# Patient Record
Sex: MALE | Race: WHITE | NOT HISPANIC OR LATINO | Employment: OTHER | URBAN - METROPOLITAN AREA
[De-identification: names, ages, dates, MRNs, and addresses within clinical notes are randomized per-mention and may not be internally consistent; named-entity substitution may affect disease eponyms.]

---

## 2017-01-02 ENCOUNTER — TELEPHONE (OUTPATIENT)
Dept: FAMILY MEDICINE | Facility: CLINIC | Age: 67
End: 2017-01-02

## 2017-01-02 ENCOUNTER — THERAPY VISIT (OUTPATIENT)
Dept: PHYSICAL THERAPY | Facility: CLINIC | Age: 67
End: 2017-01-02
Payer: COMMERCIAL

## 2017-01-02 DIAGNOSIS — G89.29 CHRONIC LEFT-SIDED LOW BACK PAIN WITHOUT SCIATICA: Primary | ICD-10-CM

## 2017-01-02 DIAGNOSIS — M54.50 CHRONIC LEFT-SIDED LOW BACK PAIN WITHOUT SCIATICA: Primary | ICD-10-CM

## 2017-01-02 DIAGNOSIS — M54.5 LOW BACK PAIN, UNSPECIFIED BACK PAIN LATERALITY, UNSPECIFIED CHRONICITY, WITH SCIATICA PRESENCE UNSPECIFIED: Primary | ICD-10-CM

## 2017-01-02 PROCEDURE — 97162 PT EVAL MOD COMPLEX 30 MIN: CPT | Mod: GP | Performed by: PHYSICAL THERAPIST

## 2017-01-02 PROCEDURE — 97112 NEUROMUSCULAR REEDUCATION: CPT | Mod: GP | Performed by: PHYSICAL THERAPIST

## 2017-01-02 PROCEDURE — 97110 THERAPEUTIC EXERCISES: CPT | Mod: GP | Performed by: PHYSICAL THERAPIST

## 2017-01-02 NOTE — TELEPHONE ENCOUNTER
Reason for Call:  Other     Detailed comments: Patient would like physical therapy orders for low back pain    Phone Number Patient can be reached at: Cell number on file:    Telephone Information:   Mobile [phone]       Best Time:     Can we leave a detailed message on this number? NO    Call taken on 1/2/2017 at 9:58 AM by Divya Hall

## 2017-01-02 NOTE — TELEPHONE ENCOUNTER
Please see message below.  Patient would PT orders for low back pain.  RN sees previous PT orders from 7/8/2015.    Last OV was 1/20/2016, does have upcoming OV 2/1/2017.      Routed to PCP to advise on PT orders.    Winter Pinto RN  [facility]

## 2017-01-02 NOTE — TELEPHONE ENCOUNTER
Called and spoke with patient, advised of message as below.  He will call [facility] to set up appt.    Winter Pinto RN  [facility]

## 2017-01-02 NOTE — PROGRESS NOTES
[facility] Initial Evaluation -- Lumbar    Date: January 2, 2017  Trent Estrada is a 66 year old male with a Lumbar condition.   Referral: GP  Work Mechanical Stresses:    Employment Status:  Prolonged sitting/computer work - normal hours and duties  Leisure Mechanical Stresses: core exer 3 x/wk and areobics and wts at club 3 x/week  Functional disability score (CANDIDO/STarT Back):  8  Visual Analog Score (VAS 0-10): 4/10, 2-6/10 range    HISTORY:    Present symptoms: Lt Groin and buttock, no leg pain  Pain quality (sharp/shooting/stabbing/aching/burning/cramping):  Constant aching groin, int aching buttock   Paresthesia (yes/no):  no    Present since: exacerbation 4-5 mos ago .   Symptoms (improving/unchanging/worsening):  unchanging.   Symptoms commenced as a result of: JAMES   Condition occurred in the following environment:   unknown     Symptoms at onset (back/thigh/leg): buttock and groin  Constant symptoms (back/thigh/leg): Lt groin   Intermittent symptoms (back/thigh/leg): Lt buttock    (With consideration for bending, sitting/rising, standing, walking, lying, am/as the day progresses/pm, when still/on the move)  Symptoms are made worse with the following: rising from sitting - 4/10, walking 3-4/10, prolonged stdg, occasional w/ changing position in bed, worse in AM, When still  Symptoms are made better with the following: on the move,     Disturbed sleep (yes/no):  occas pain w/ position changes, not waking d/t pain, Sleeping postures (prone/sup/side R/L): stomach    Previous episodes (0/1-5/6-10/11+): <5 years Year of first episode: 2014    Previous history: JAMES for onset,  Previous treatments: Previous PT - helped but pain returned      Specific Questions:  Cough/Sneeze/Strain (pos/neg): no  Bowel/Bladder (normal/abnormal): normal  Gait (normal/abnormal): normal  Medications (nil/NSAIDS/analg/steroids/anticoag/other):  none  Medical allergies:  none  General Health  (excellent/good/fair/poor):  good  Pertinent medical history:  none  Imaging (NA/Xray/MRI):  none  Recent or major surgery (yes/no):  none  Night pain (yes/no): no  Accidents (yes/no): no  Unexplained weight loss (yes/no): no  Barriers at home: none  Other red flags: none    EXAMINATION    Posture:   Sitting (good/fair/poor): fair  Standing (good/fair/poor):good  Lordosis (red/acc/normal): normal  Correction of posture (better/worse/no effect): better    Lateral Shift (right/left/nil): nil  Relevant (yes/no):  NA  Other Observations: NA    Neurological:    Motor deficit:  Pt can Heel/toe walk and single leg squat  Reflexes:  NA  Sensory deficit:  Normal Lt touch  Dural signs:  (-)    Movement Loss:   Armaan Mod Min Nil Pain   FlexionX    X Sl dev Lt, sl pain Lt SI   Extension  X   Central LB mild   Side Gliding R  X      Side Gliding L   X       Test Movements:   During: produces, abolishes, increases, decreases, no effect, centralizing, peripheralizing   After: better, worse, no better, no worse, no effect, centralized, peripheralized    Pre-test Symptoms Standing: slight Rt groin    Symptoms During Symptoms After ROM increased ROM decreased No Effect   FIS No Effect and         Rep FIS Produces slight in groin and   increases Incr SG Rt and Lt  No change pain   EIS        Rep EIS        Pre-test Symptoms Lying: none    Symptoms During Symptoms After ROM increased ROM decreased No Effect   KRISTIE        Rep KRISTIE        EIL No Effect and         Rep EIL No Effect (stretch LB) and   Better and   X         Static Tests:  Sitting slouched:  Pain Lt groin  Sitting erect:  decr pain  Standing slouched NA  Standing erect:  NA  Lying prone in extension:  NA Long sitting:  NA    Other Tests:     Provisional Classification:  Derangement - Asymmetrical, unilateral, symptoms above knee    Principle of Management:  Education:  Posture and need for High reps w/ exer Equipment provided:  Recommended L-roll  Mechanical therapy (Y/N):   yes   Extension principle:  Press Up and EIStdg  Lateral Principle:    Flexion principle:    Other:      ASSESSMENT/PLAN:    Patient is a 66 year old male with lumbar complaints.    Patient has the following significant findings with corresponding treatment plan.                Diagnosis 1:  LBP  Pain -  manual therapy, education, directional preference exercise and home program  Decreased ROM/flexibility - manual therapy, therapeutic exercise and home program  Decreased function - therapeutic activities and home program  Impaired posture - neuro re-education and home program    Therapy Evaluation Codes:   1) History comprised of:   Personal factors that impact the plan of care:      Profession and Time since onset of symptoms.    Comorbidity factors that impact the plan of care are:      None.     Medications impacting care: None.  2) Examination of Body Systems comprised of:   Body structures and functions that impact the plan of care:      Lumbar spine.   Activity limitations that impact the plan of care are:      Sitting, Walking and rising from sitting, bed mobility, AM.   Clinical presentation characteristics are:    Evolving/Changing.  3) Presentation comprised of:   Presentation scored as Moderate complexity with Evolving presentation with changing characteristics..  4) Decision-Making    Moderate complexity using standardized patient assessment instrument and/or measureable assessment of functional outcome.  Cumulative Therapy Evaluation is: Moderate complexity.    Previous and current functional limitations:  (See Goal Flow Sheet for this information)    Short term and Long term goals: (See Goal Flow Sheet for this information)     Communication ability:  Patient appears to be able to clearly communicate and understand verbal and written communication and follow directions correctly.  Treatment Explanation - The following has been discussed with the patient:   RX ordered/plan of care  Anticipated  outcomes  Possible risks and side effects  This patient would benefit from PT intervention to resume normal activities.   Rehab potential is good.    Frequency:  1 X week, once daily  Duration:  for 6-8 weeks  Discharge Plan:  Achieve all LTG.  Independent in home treatment program.  Reach maximal therapeutic benefit.    Please refer to the daily flowsheet for treatment today, total treatment time and time spent performing 1:1 timed codes.

## 2017-01-09 ENCOUNTER — THERAPY VISIT (OUTPATIENT)
Dept: PHYSICAL THERAPY | Facility: CLINIC | Age: 67
End: 2017-01-09
Payer: COMMERCIAL

## 2017-01-09 DIAGNOSIS — M54.50 CHRONIC LEFT-SIDED LOW BACK PAIN WITHOUT SCIATICA: Primary | ICD-10-CM

## 2017-01-09 DIAGNOSIS — G89.29 CHRONIC LEFT-SIDED LOW BACK PAIN WITHOUT SCIATICA: Primary | ICD-10-CM

## 2017-01-09 PROCEDURE — 97110 THERAPEUTIC EXERCISES: CPT | Mod: GP | Performed by: PHYSICAL THERAPIST

## 2017-01-09 PROCEDURE — 97530 THERAPEUTIC ACTIVITIES: CPT | Mod: GP | Performed by: PHYSICAL THERAPIST

## 2017-01-16 ENCOUNTER — THERAPY VISIT (OUTPATIENT)
Dept: PHYSICAL THERAPY | Facility: CLINIC | Age: 67
End: 2017-01-16
Payer: COMMERCIAL

## 2017-01-16 DIAGNOSIS — G89.29 CHRONIC LEFT-SIDED LOW BACK PAIN WITHOUT SCIATICA: Primary | ICD-10-CM

## 2017-01-16 DIAGNOSIS — M54.50 CHRONIC LEFT-SIDED LOW BACK PAIN WITHOUT SCIATICA: Primary | ICD-10-CM

## 2017-01-16 PROCEDURE — 97530 THERAPEUTIC ACTIVITIES: CPT | Mod: GP | Performed by: PHYSICAL THERAPIST

## 2017-01-16 PROCEDURE — 97110 THERAPEUTIC EXERCISES: CPT | Mod: GP | Performed by: PHYSICAL THERAPIST

## 2017-01-16 NOTE — PROGRESS NOTES
SUBJECTIVE:                                                            Trent Estrada is a 66 year old male who presents for a Preventive Visit.  Are you in the first 12 months of your Medicare Part B coverage?  No    Healthy Habits:    Do you get at least three servings of calcium containing foods daily (dairy, green leafy vegetables, etc.)? 2 servings    Amount of exercise or daily activities, outside of work: 5 day(s) per week    Problems taking medications regularly not applicable    Medication side effects: No    Have you had an eye exam in the past two years? yes    Do you see a dentist twice per year? yes    Do you have sleep apnea, excessive snoring or daytime drowsiness?no    COGNITIVE SCREEN  1) Repeat 3 items (Banana, Sunrise, Chair)    2) Clock draw: NORMAL  3) 3 item recall: Recalls 3 objects  Results: 3 items recalled: COGNITIVE IMPAIRMENT LESS LIKELY    Mini-CogTM Copyright S Rehana. Licensed by the author for use in [facility]; reprinted with permission (soob@[facility]). All rights reserved.          Other concerns:  None    All Histories reviewed and updated in [facility] as appropriate.  Social History   Substance Use Topics     Smoking status: Never Smoker      Smokeless tobacco: Never Used     Alcohol Use: No       The patient does not drink >3 drinks per day nor >7 drinks per week.    Today's PHQ-2 Score:   PHQ-2 ( 1999 Pf[employer]) 2/1/2017 1/20/2016   Q1: Little interest or pleasure in doing things 0 0   Q2: Feeling down, depressed or hopeless 0 0   PHQ-2 Score 0 0       Do you feel safe in your environment - Yes    Do you have a Health Care Directive?: No: Advance care planning reviewed with patient; information given to patient to review.    Current providers sharing in care for this patient include:   Patient Care Team:  Yonis Martin MD as PCP - General      Hearing impairment: No    Ability to successfully perform activities of daily living: Yes, no assistance needed     Fall  risk:  Fallen 2 or more times in the past year?: No  Any fall with injury in the past year?: No    Home safety:  none identified      The following health maintenance items are reviewed in Epic and correct as of today:  Health Maintenance   Topic Date Due     CHACORTA QUESTIONNAIRE 1 YEAR  1950     PHQ-9 Q1YR (NO INBASKET)  1950     AORTIC ANEURYSM SCREENING (SYSTEM ASSIGNED)  12/15/2015     PNEUMOCOCCAL (2 of 2 - PPSV23) 01/20/2017     FALL RISK ASSESSMENT  01/20/2017     INFLUENZA VACCINE (SYSTEM ASSIGNED)  09/01/2017     ADVANCE DIRECTIVE PLANNING Q5 YRS (NO INBASKET)  12/19/2018     COLON CANCER SCREEN (SYSTEM ASSIGNED)  12/29/2018     TETANUS IMMUNIZATION (SYSTEM ASSIGNED)  03/07/2021     LIPID SCREEN Q5 YR MALE (SYSTEM ASSIGNED)  01/24/2022     HEPATITIS C SCREENING  Completed   He continues to see dermatology on a regular basis for close follow-up on his history of skin cancers and actinic keratoses.    ROS:  C: NEGATIVE for fever, chills, change in weight  INTEGUMENTARY/SKIN: see above  E: NEGATIVE for vision changes or irritation  E/M: NEGATIVE for ear, mouth and throat problems  R: NEGATIVE for significant cough or SOB  B: NEGATIVE for masses, tenderness or discharge  CV: NEGATIVE for chest pain, palpitations or peripheral edema  GI: NEGATIVE for nausea, abdominal pain, heartburn, or change in bowel habits  : NEGATIVE for frequency, dysuria, or hematuria  M: NEGATIVE for significant arthralgias or myalgia; he was having low back discomfort, but that has essentially cleared now with physical therapy  N: NEGATIVE for weakness, dizziness or paresthesias  E: NEGATIVE for temperature intolerance, skin/hair changes  H: NEGATIVE for bleeding problems  P: NEGATIVE for changes in mood or affect    Patient Active Problem List   Diagnosis     Actinic keratoses     Advanced directives, counseling/discussion     History of skin cancer     Osteoarthritis of both hips     Past Surgical History   Procedure  "Laterality Date     Tonsillectomy       Vasectomy       VAS,REVERSAL,VAS     C unlisted vascular endoscopy proc       VAS REVERSAL     Right ear skin ca removal and reconstruction  2012     had a skin graft from right side of neck, too       Social History   Substance Use Topics     Smoking status: Never Smoker      Smokeless tobacco: Never Used     Alcohol Use: No     Family History   Problem Relation Age of Onset     Hypertension Mother       age 94 of CVA     Hypertension Father      CANCER Father      Prostate Cancer Father      in mid 70's,  age 84 of pneumonia     Alzheimer Disease Father      CANCER Son          Current Outpatient Prescriptions   Medication Sig Dispense Refill     NO ACTIVE MEDICATIONS        No Known Allergies  OBJECTIVE:                                                            /74 mmHg  Pulse 59  Temp(Src) 96.9  F (36.1  C) (Oral)  Ht 5' 10.75\" (1.797 m)  Wt 160 lb (72.576 kg)  BMI 22.47 kg/m2  SpO2 98% Estimated body mass index is 22.47 kg/(m^2) as calculated from the following:    Height as of this encounter: 5' 10.75\" (1.797 m).    Weight as of this encounter: 160 lb (72.576 kg).  EXAM:   GENERAL: healthy, alert and no distress  EYES: Eyes grossly normal to inspection, PERRL and conjunctivae and sclerae normal  HENT: ear canals and TM's normal, nose and mouth without ulcers or lesions  NECK: no adenopathy, no asymmetry, masses, or scars and thyroid normal to palpation  RESP: lungs clear to auscultation - no rales, rhonchi or wheezes  CV: regular rate and rhythm, normal S1 S2, no S3 or S4, no murmur, click or rub, no peripheral edema and peripheral pulses intact  ABDOMEN: soft, nontender, no hepatosplenomegaly, no masses   RECTAL: normal sphincter tone, no rectal masses, prostate normal size, smooth, nontender without nodules or masses  MS: no gross musculoskeletal defects noted, no edema  SKIN: he has a healing excision site on his right lower neck recently " from a skin cancer excision. Has numerous other healed scars from skin cancer removals elsewhere.  NEURO: Normal strength and tone, mentation intact and speech normal  PSYCH: mentation appears normal, affect normal/bright    Orders Only on 01/24/2017   Component Date Value Ref Range Status     Hepatitis C Antibody 01/24/2017   NR Final                    Value:Nonreactive   Assay performance characteristics have not been established for newborns,   infants, and children       Glucose 01/24/2017 90  70 - 99 mg/dL Final    Fasting specimen     Cholesterol 01/24/2017 125  <200 mg/dL Final     Triglycerides 01/24/2017 60  <150 mg/dL Final    Fasting specimen     HDL Cholesterol 01/24/2017 49  >39 mg/dL Final     LDL Cholesterol Calculated 01/24/2017 64  <100 mg/dL Final    Desirable:       <100 mg/dl     Non HDL Cholesterol 01/24/2017 76  <130 mg/dL Final     WBC 01/24/2017 6.6  4.0 - 11.0 10e9/L Final     RBC Count 01/24/2017 4.82  4.4 - 5.9 10e12/L Final     Hemoglobin 01/24/2017 15.3  13.3 - 17.7 g/dL Final     Hematocrit 01/24/2017 43.8  40.0 - 53.0 % Final     MCV 01/24/2017 91  78 - 100 fl Final     MCH 01/24/2017 31.7  26.5 - 33.0 pg Final     MCHC 01/24/2017 34.9  31.5 - 36.5 g/dL Final     RDW 01/24/2017 13.3  10.0 - 15.0 % Final     Platelet Count 01/24/2017 180  150 - 450 10e9/L Final     PSA 01/24/2017 1.56  0 - 4 ug/L Final    Assay Method:  Chemiluminescence using Siemens Vista analyzer         ASSESSMENT / PLAN:                                                                ICD-10-CM    1. Routine general medical examination at a health care facility Z00.00    2. History of skin cancer Z85.828    3. Need for pneumococcal vaccination Z23 ADMIN PNEUMOCOCCAL VACCINE     PNEUMOCOCCAL VACCINE,ADULT,SQ OR IM     His vital signs continued to look excellent  Labs are all nice and normal  We will give him a Pneumovax 23 today          He will then be caught up on routine vaccines  Discussed doing a repeat  "colonoscopy in about 2 years  Given his family history of prostate cancer, discussed doing a PSA and lab work every few years or so  Continue routine dermatology follow-up  Plan a routine annual physical in 1 year, or f/u sooner prn    End of Life Planning:  Patient currently has an advanced directive: No.  I have verified the patient's ablity to prepare an advanced directive/make health care decisions.  Literature was provided to assist patient in preparing an advanced directive.    COUNSELING:  Reviewed preventive health counseling, as reflected in patient instructions       Regular exercise       Healthy diet/nutrition       Vaccinated for: Pneumococcal        Estimated body mass index is 22.47 kg/(m^2) as calculated from the following:    Height as of this encounter: 5' 10.75\" (1.797 m).    Weight as of this encounter: 160 lb (72.576 kg).     reports that he has never smoked. He has never used smokeless tobacco.      Appropriate preventive services were discussed with this patient, including applicable screening as appropriate for cardiovascular disease, diabetes, osteopenia/osteoporosis, and glaucoma.  As appropriate for age/gender, discussed screening for colorectal cancer, prostate cancer, breast cancer, and cervical cancer. Checklist reviewing preventive services available has been given to the patient.    Reviewed patients plan of care and provided an AVS. The Basic Care Plan (routine screening as documented in Health Maintenance) for Trent meets the Care Plan requirement. This Care Plan has been established and reviewed with the Patient.    Counseling Resources:  ATP IV Guidelines  Pooled Cohorts Equation Calculator  Breast Cancer Risk Calculator  FRAX Risk Assessment  ICSI Preventive Guidelines  Dietary Guidelines for Americans, 2010  USDA's MyPlate  ASA Prophylaxis  Lung CA Screening    Yonis Martin MD  [facility]  "

## 2017-01-23 ENCOUNTER — THERAPY VISIT (OUTPATIENT)
Dept: PHYSICAL THERAPY | Facility: CLINIC | Age: 67
End: 2017-01-23
Payer: COMMERCIAL

## 2017-01-23 DIAGNOSIS — M54.50 CHRONIC LEFT-SIDED LOW BACK PAIN WITHOUT SCIATICA: Primary | ICD-10-CM

## 2017-01-23 DIAGNOSIS — G89.29 CHRONIC LEFT-SIDED LOW BACK PAIN WITHOUT SCIATICA: Primary | ICD-10-CM

## 2017-01-23 PROCEDURE — 97140 MANUAL THERAPY 1/> REGIONS: CPT | Mod: GP | Performed by: PHYSICAL THERAPIST

## 2017-01-23 PROCEDURE — 97530 THERAPEUTIC ACTIVITIES: CPT | Mod: GP | Performed by: PHYSICAL THERAPIST

## 2017-01-23 PROCEDURE — 97110 THERAPEUTIC EXERCISES: CPT | Mod: GP | Performed by: PHYSICAL THERAPIST

## 2017-01-24 DIAGNOSIS — Z11.59 NEED FOR HEPATITIS C SCREENING TEST: ICD-10-CM

## 2017-01-24 DIAGNOSIS — Z00.00 ROUTINE GENERAL MEDICAL EXAMINATION AT A HEALTH CARE FACILITY: ICD-10-CM

## 2017-01-24 LAB
CHOLEST SERPL-MCNC: 125 MG/DL
ERYTHROCYTE [DISTWIDTH] IN BLOOD BY AUTOMATED COUNT: 13.3 % (ref 10–15)
GLUCOSE SERPL-MCNC: 90 MG/DL (ref 70–99)
HCT VFR BLD AUTO: 43.8 % (ref 40–53)
HCV AB SERPL QL IA: NORMAL
HDLC SERPL-MCNC: 49 MG/DL
HGB BLD-MCNC: 15.3 G/DL (ref 13.3–17.7)
LDLC SERPL CALC-MCNC: 64 MG/DL
MCH RBC QN AUTO: 31.7 PG (ref 26.5–33)
MCHC RBC AUTO-ENTMCNC: 34.9 G/DL (ref 31.5–36.5)
MCV RBC AUTO: 91 FL (ref 78–100)
NONHDLC SERPL-MCNC: 76 MG/DL
PLATELET # BLD AUTO: 180 10E9/L (ref 150–450)
PSA SERPL-ACNC: 1.56 UG/L (ref 0–4)
RBC # BLD AUTO: 4.82 10E12/L (ref 4.4–5.9)
TRIGL SERPL-MCNC: 60 MG/DL
WBC # BLD AUTO: 6.6 10E9/L (ref 4–11)

## 2017-01-24 PROCEDURE — 36415 COLL VENOUS BLD VENIPUNCTURE: CPT | Performed by: FAMILY MEDICINE

## 2017-01-24 PROCEDURE — 82947 ASSAY GLUCOSE BLOOD QUANT: CPT | Performed by: FAMILY MEDICINE

## 2017-01-24 PROCEDURE — 80061 LIPID PANEL: CPT | Performed by: FAMILY MEDICINE

## 2017-01-24 PROCEDURE — G0103 PSA SCREENING: HCPCS | Performed by: FAMILY MEDICINE

## 2017-01-24 PROCEDURE — 86803 HEPATITIS C AB TEST: CPT | Performed by: FAMILY MEDICINE

## 2017-01-24 PROCEDURE — 85027 COMPLETE CBC AUTOMATED: CPT | Performed by: FAMILY MEDICINE

## 2017-01-30 ENCOUNTER — THERAPY VISIT (OUTPATIENT)
Dept: PHYSICAL THERAPY | Facility: CLINIC | Age: 67
End: 2017-01-30
Payer: COMMERCIAL

## 2017-01-30 DIAGNOSIS — M54.50 CHRONIC LEFT-SIDED LOW BACK PAIN WITHOUT SCIATICA: Primary | ICD-10-CM

## 2017-01-30 DIAGNOSIS — G89.29 CHRONIC LEFT-SIDED LOW BACK PAIN WITHOUT SCIATICA: Primary | ICD-10-CM

## 2017-01-30 PROCEDURE — 97110 THERAPEUTIC EXERCISES: CPT | Mod: GP | Performed by: PHYSICAL THERAPIST

## 2017-01-30 PROCEDURE — 97530 THERAPEUTIC ACTIVITIES: CPT | Mod: GP | Performed by: PHYSICAL THERAPIST

## 2017-01-31 NOTE — PROGRESS NOTES
Subjective:    HPI  Oswestry Score: 4 %                 Objective:    System    Physical Exam    General     ROS    Assessment/Plan:      DISCHARGE REPORT    Progress reporting period is from 1-2-17 to 1-30-17.       SUBJECTIVE  Subjective changes noted by patient:  Doing much better this week.  Occas slight LB discomfort Rt LB with getting up after exercises.  Occas groin twinge with prolonged sitting.  Was skiing yesterday without pain yesterday and no pain today.  Current Pain level: 0/10.     Initial Pain level: 4/10 (ranges 2-6/10).   Changes in function:  Yes (See Goal flowsheet attached for changes in current functional level)  Adverse reaction to treatment or activity: None    OBJECTIVE  Changes noted in objective findings:    Trunk AROM is WNL for all movements and painfree.  Core strength is good.     ASSESSMENT/PLAN  Updated problem list and treatment plan: Diagnosis 1:  LBP  Decreased strength - home program  Decreased function - home program  STG/LTGs have been met or progress has been made towards goals:  Yes (See Goal flow sheet completed today.)  Assessment of Progress: The patient has met all of their long term goals.  Self Management Plans:  Patient is independent in a home treatment program.    Trent continues to require the following intervention to meet STG and LTG's:  PT intervention is no longer required to meet STG/LTG.    Recommendations:  This patient is ready to be discharged from therapy and continue their home treatment program.    Please refer to the daily flowsheet for treatment today, total treatment time and time spent performing 1:1 timed codes.

## 2017-02-01 ENCOUNTER — OFFICE VISIT (OUTPATIENT)
Dept: FAMILY MEDICINE | Facility: CLINIC | Age: 67
End: 2017-02-01
Payer: COMMERCIAL

## 2017-02-01 VITALS
DIASTOLIC BLOOD PRESSURE: 74 MMHG | HEART RATE: 59 BPM | BODY MASS INDEX: 22.4 KG/M2 | OXYGEN SATURATION: 98 % | WEIGHT: 160 LBS | TEMPERATURE: 96.9 F | SYSTOLIC BLOOD PRESSURE: 119 MMHG | HEIGHT: 71 IN

## 2017-02-01 DIAGNOSIS — Z00.00 ROUTINE GENERAL MEDICAL EXAMINATION AT A HEALTH CARE FACILITY: Primary | ICD-10-CM

## 2017-02-01 DIAGNOSIS — Z23 NEED FOR PNEUMOCOCCAL VACCINATION: ICD-10-CM

## 2017-02-01 DIAGNOSIS — Z85.828 HISTORY OF SKIN CANCER: ICD-10-CM

## 2017-02-01 PROCEDURE — 99397 PER PM REEVAL EST PAT 65+ YR: CPT | Mod: 25 | Performed by: FAMILY MEDICINE

## 2017-02-01 PROCEDURE — 90471 IMMUNIZATION ADMIN: CPT | Performed by: FAMILY MEDICINE

## 2017-02-01 PROCEDURE — 90732 PPSV23 VACC 2 YRS+ SUBQ/IM: CPT | Performed by: FAMILY MEDICINE

## 2017-02-01 ASSESSMENT — ANXIETY QUESTIONNAIRES
6. BECOMING EASILY ANNOYED OR IRRITABLE: NOT AT ALL
GAD7 TOTAL SCORE: 0
5. BEING SO RESTLESS THAT IT IS HARD TO SIT STILL: NOT AT ALL
3. WORRYING TOO MUCH ABOUT DIFFERENT THINGS: NOT AT ALL
7. FEELING AFRAID AS IF SOMETHING AWFUL MIGHT HAPPEN: NOT AT ALL
2. NOT BEING ABLE TO STOP OR CONTROL WORRYING: NOT AT ALL
1. FEELING NERVOUS, ANXIOUS, OR ON EDGE: NOT AT ALL

## 2017-02-01 ASSESSMENT — PATIENT HEALTH QUESTIONNAIRE - PHQ9: 5. POOR APPETITE OR OVEREATING: NOT AT ALL

## 2017-02-01 NOTE — NURSING NOTE
Patient is covered under this program for the following reason:  Not [facility] Eligible: Insured - Has insurance that covers the cost of all vaccines (Not [facility] elligible because insurance already covers all vaccines)    Patient/parent was given the [facility] Eligibility Information sheet today, , with their vaccinations.      Screening Questionnaire for Adult Immunization   Are you sick today?   No    Do you have allergies to medications, food, a vaccine component or latex?   No    Have you ever had a serious reaction after receiving a vaccination?   No    Do you have a long-term health problem with heart disease, lung disease,  asthma, kidney disease, metabolic (e.g., diabetes), anemia, or other blood disorder?   No    Do you have cancer, leukemia,HIV/ AIDS, or any immune system problem?   No       In the past 3 months, have you taken medications that weaken your immune system, such as cortisone, prednisone, other steroids, or anticancer drugs, or have you had radiation treatments?   No    Have you had a seizure or a brain, or other nervous system problem?   No    During the past year, have you received a transfusion of blood or blood       products, or been given a  immune (gamma) globulin or an antiviral drug?   No    For women: Are you pregnant or is there a chance you could become         pregnant during the next month?   No    Have you received any vaccinations in the past 4 weeks?   No     Immunization questionnaire answers were all negative.     VIS for Pneumovax 23 given on same date of administration.  Patient instructed to remain in clinic for 20 minutes afterwards, and to report any adverse reaction to me immediately.    Staff signature/Title: Edith Dickens CMA

## 2017-02-01 NOTE — MR AVS SNAPSHOT
After Visit Summary   2/1/2017    Trent Estrada    MRN: [id number]           Patient Information     Date Of Birth          1950        Visit Information        Provider Department      2/1/2017 4:40 PM Yonis Martin MD [facility]        Today's Diagnoses     Routine general medical examination at a health care facility    -  1     History of skin cancer         Need for pneumococcal vaccination           Care Instructions      Preventive Health Recommendations:       Male Ages 65 and over    Yearly exam:             See your health care provider every year in order to  o   Review health changes.   o   Discuss preventive care.    o   Review your medicines if your doctor has prescribed any.    Talk with your health care provider about whether you should have a test to screen for prostate cancer (PSA).    Every 3 years, have a diabetes test (fasting glucose). If you are at risk for diabetes, you should have this test more often.    Every 5 years, have a cholesterol test. Have this test more often if you are at risk for high cholesterol or heart disease.     Every 10 years, have a colonoscopy. Or, have a yearly FIT test (stool test). These exams will check for colon cancer.    Talk to with your health care provider about screening for Abdominal Aortic Aneurysm if you have a family history of AAA or have a history of smoking.  Shots:     Get a flu shot each year.     Get a tetanus shot every 10 years.     Talk to your doctor about your pneumonia vaccines. There are now two you should receive - Pneumovax (PPSV 23) and Prevnar (PCV 13).    Talk to your doctor about a shingles vaccine.     Talk to your doctor about the hepatitis B vaccine.  Nutrition:     Eat at least 5 servings of fruits and vegetables each day.     Eat whole-grain bread, whole-wheat pasta and brown rice instead of white grains and rice.     Talk to your doctor about Calcium and Vitamin D.  "  Lifestyle    Exercise for at least 150 minutes a week (30 minutes a day, 5 days a week). This will help you control your weight and prevent disease.     Limit alcohol to one drink per day.     No smoking.     Wear sunscreen to prevent skin cancer.     See your dentist every six months for an exam and cleaning.     See your eye doctor every 1 to 2 years to screen for conditions such as glaucoma, macular degeneration and cataracts.        Follow-ups after your visit        Follow-up notes from your care team     Return in about 1 year (around 2/1/2018).      Who to contact     If you have questions or need follow up information about today's clinic visit or your schedule please contact [facility] directly at [phone].  Normal or non-critical lab and imaging results will be communicated to you by Skyepackhart, letter or phone within 4 business days after the clinic has received the results. If you do not hear from us within 7 days, please contact the clinic through Skyepackhart or phone. If you have a critical or abnormal lab result, we will notify you by phone as soon as possible.  Submit refill requests through [employer] or call your pharmacy and they will forward the refill request to us. Please allow 3 business days for your refill to be completed.          Additional Information About Your Visit        [employer] Information     [employer] lets you send messages to your doctor, view your test results, renew your prescriptions, schedule appointments and more. To sign up, go to [email or web address] . Click on \"Log in\" on the left side of the screen, which will take you to the Welcome page. Then click on \"Sign up Now\" on the right side of the page.     You will be asked to enter the access code listed below, as well as some personal information. Please follow the directions to create your username and password.     Your access code is: [email or web address]  Expires: 5/2/2017  5:11 PM     Your access code will " " in 90 days. If you need help or a new code, please call your [place] clinic or [phone].        Care EveryWhere ID     This is your Care EveryWhere ID. This could be used by other organizations to access your [place] medical records  [id number]        Your Vitals Were     Pulse Temperature Height BMI (Body Mass Index) Pulse Oximetry       59 96.9  F (36.1  C) (Oral) 5' 10.75\" (1.797 m) 22.47 kg/m2 98%        Blood Pressure from Last 3 Encounters:   17 119/74   16 110/74   04/22/15 116/74    Weight from Last 3 Encounters:   17 160 lb (72.576 kg)   16 171 lb 12 oz (77.905 kg)   04/22/15 170 lb (77.111 kg)              We Performed the Following     ADMIN PNEUMOCOCCAL VACCINE     PNEUMOCOCCAL VACCINE,ADULT,SQ OR IM        Primary Care Provider Office Phone # Fax #    Yonis Martin [phone]       [facility] [address] [facility] [address]        Thank you!     Thank you for choosing [facility]  for your care. Our goal is always to provide you with excellent care. Hearing back from our patients is one way we can continue to improve our services. Please take a few minutes to complete the written survey that you may receive in the mail after your visit with us. Thank you!             Your Updated Medication List - Protect others around you: Learn how to safely use, store and throw away your medicines at www.disposemymeds.org.          This list is accurate as of: 17  5:11 PM.  Always use your most recent med list.                   Brand Name Dispense Instructions for use    NO ACTIVE MEDICATIONS            "

## 2017-02-01 NOTE — NURSING NOTE
"Chief Complaint   Patient presents with     Wellness Visit     Health Maintenance     Phq9, GAD7, Flu shot       Initial /74 mmHg  Pulse 59  Temp(Src) 96.9  F (36.1  C) (Oral)  Ht 5' 10.75\" (1.797 m)  Wt 160 lb (72.576 kg)  BMI 22.47 kg/m2  SpO2 98% Estimated body mass index is 22.47 kg/(m^2) as calculated from the following:    Height as of this encounter: 5' 10.75\" (1.797 m).    Weight as of this encounter: 160 lb (72.576 kg).  BP completed using cuff size: regular right arm  Phq9 and GAD7 was given to the patient to be completed.    Edith Dickens CMA       "

## 2017-02-02 ASSESSMENT — PATIENT HEALTH QUESTIONNAIRE - PHQ9: SUM OF ALL RESPONSES TO PHQ QUESTIONS 1-9: 0

## 2017-02-02 ASSESSMENT — ANXIETY QUESTIONNAIRES: GAD7 TOTAL SCORE: 0

## 2018-01-02 ENCOUNTER — TELEPHONE (OUTPATIENT)
Dept: FAMILY MEDICINE | Facility: CLINIC | Age: 68
End: 2018-01-02

## 2018-01-02 DIAGNOSIS — G89.29 CHRONIC LEFT-SIDED LOW BACK PAIN WITHOUT SCIATICA: Primary | ICD-10-CM

## 2018-01-02 DIAGNOSIS — M54.50 CHRONIC LEFT-SIDED LOW BACK PAIN WITHOUT SCIATICA: Primary | ICD-10-CM

## 2018-01-02 NOTE — TELEPHONE ENCOUNTER
"Called and spoke with patient, he was in for low back \"stuff\" about a year ago.  RN sees chronic left-sided low back pain without sciatica listed in chart for PT - he states this is the same thing he would like to see PT again for.  Would like to return to the [facility] at [facility].      Routed to PCP.    Winter Pinto RN  [facility]    "

## 2018-01-02 NOTE — TELEPHONE ENCOUNTER
Called, no answer, left detailed message on voicemail, relaying that referral is in and gave number to schedule.  Left RN Triage line in case of questions.    Winter Pinto RN  [facility]

## 2018-01-02 NOTE — TELEPHONE ENCOUNTER
Ok -- I made a referral for him. Physical Therapy will call him to set an appt and he can arrange for this at [place].

## 2018-01-02 NOTE — TELEPHONE ENCOUNTER
Reason for Call:  Other call back    Detailed comments: Patient would like a call back to discuss getting a referral to physical therapy. Please call back to discuss.     Phone Number Patient can be reached at: Other phone number:  [phone]    Best Time: anytime    Can we leave a detailed message on this number? YES    Call taken on 1/2/2018 at 1:41 PM by Wilmar Brody

## 2018-01-03 ENCOUNTER — THERAPY VISIT (OUTPATIENT)
Dept: PHYSICAL THERAPY | Facility: CLINIC | Age: 68
End: 2018-01-03
Payer: COMMERCIAL

## 2018-01-03 DIAGNOSIS — G89.29 CHRONIC LEFT-SIDED LOW BACK PAIN WITH LEFT-SIDED SCIATICA: Primary | ICD-10-CM

## 2018-01-03 DIAGNOSIS — M54.42 CHRONIC LEFT-SIDED LOW BACK PAIN WITH LEFT-SIDED SCIATICA: Primary | ICD-10-CM

## 2018-01-03 PROCEDURE — 97110 THERAPEUTIC EXERCISES: CPT | Mod: GP | Performed by: PHYSICAL THERAPIST

## 2018-01-03 PROCEDURE — 97161 PT EVAL LOW COMPLEX 20 MIN: CPT | Mod: GP | Performed by: PHYSICAL THERAPIST

## 2018-01-03 NOTE — PROGRESS NOTES
[facility] Initial Evaluation -- Lumbar    Date: January 3, 2018  Trent Estrada is a 67 year old male with a low back condition.   Referral: GP  Work mechanical stresses:  Sedentary, sitting irritates the groin  Employment status:    Leisure mechanical stresses: downhill ski a few times a month, this doesn't seem to bother it  Functional disability score (CANDIDO/STarT Back):  See flow sheet  VAS score (0-10): 3  Patient goals/expectations:  Decrease pain, sleep through the night    HISTORY:    Present symptoms: left groin pain  Pain quality (sharp/shooting/stabbing/aching/burning/cramping):  achy   Paresthesia (yes/no):  Tingling occasionally L lateral calf    Present since (onset date): last few months.     Symptoms (improving/unchanging/worsening):  worsening.     Symptoms commenced as a result of: no apparent reason   Condition occurred in the following environment:   NA     Symptoms at onset (back/thigh/leg): L groin  Constant symptoms (back/thigh/leg): none  Intermittent symptoms (back/thigh/leg): comes and goes in left groin    Symptoms are made worse with the following: Always Sitting, sometimes standing, sometimes walking, always am, sometimes pm   Symptoms are made better with the following: Other - press ups/ standing extension    Disturbed sleep (yes/no):  Yes, 1-2 times/week Sleeping postures (prone/sup/side R/L): prone    Previous episodes (0/1-5/6-10/11+): 3 years ago Year of first episode: 3 years ago    Previous history: yes  Previous treatments: PT in January of 2017, Ext responder      Specific Questions:  Cough/Sneeze/Strain (pos/neg): neg  Bowel/Bladder (normal/abnormal): normal  Gait (normal/abnormal): normal  Medications (nil/NSAIDS/analg/steroids/anticoag/other):  None  Medical allergies:  none  General health (excellent/good/fair/poor):  good  Pertinent medical history:  None  Imaging (None/Xray/MRI/Other):  Previous herniated discs L4-L5  Recent or  major surgery (yes/no):  no  Night pain (yes/no): no  Accidents (yes/no): no  Unexplained weight loss (yes/no): no  Barriers at home: none  Other red flags: None    EXAMINATION    Posture:   Sitting (good/fair/poor): good  Standing (good/fair/poor):good  Lordosis (red/acc/normal): normal  Correction of posture (better/worse/no effect): NE    Lateral Shift (right/left/nil): nil  Relevant (yes/no):  no  Other Observations: NA    Neurological:    Motor deficit:  NT  Reflexes:  NT  Sensory deficit:  NA  Dural signs:  Slump negative    Movement Loss:   Armaan Mod Min Nil Pain   Flexion    x    Extension    x    Side Gliding R   x  End range pain L low back   Side Gliding L   x       Test Movements:   During: produces, abolishes, increases, decreases, no effect, centralizing, peripheralizing   After: better, worse, no better, no worse, no effect, centralized, peripheralized    Pretest symptoms standin/10 pain   Symptoms During Symptoms After ROM increased ROM decreased No Effect   FIS        Rep FIS        EIS        Rep EIS        Pretest symptoms lyin/10 pain   Symptoms During Symptoms After ROM increased ROM decreased No Effect   KRISTIE        Rep KRISTIE        EIL No Effect    No Effect         Rep EIL No Effect    No Effect    jamison sideglide abolished end range groin pain     If required, pretest symptoms: NT   Symptoms During Symptoms After ROM increased ROM decreased No Effect   SGIS - R        Rep SGIS - R        SGIS - L        Rep SGIS - L          Static Tests:  Sitting slouched:    Sitting erect:    Standing slouched   Standing erect:    Lying prone in extension:   Long sitting:      Other Tests: Rep FISit: NE during, NE after, decreased jamison sideglide and produced groin pain    Provisional Classification:  Derangement - Asymmetrical, unilateral, symptoms above knee    Principle of Management:  Education:  Specificity of exercise   Equipment provided:  none  Mechanical therapy (Y/N):  Y   Extension principle:   EIL 10-15 reps every 2-3 hours daily  Lateral Principle:  no  Flexion principle:  no  Other:  no    ASSESSMENT/PLAN:    Patient is a 67 year old male with lumbar complaints.    Patient has the following significant findings with corresponding treatment plan.                Diagnosis 1:  Low back pain  Pain -  self management, education, directional preference exercise and home program  Decreased ROM/flexibility - manual therapy, therapeutic exercise and home program  Decreased joint mobility - manual therapy, therapeutic exercise and home program  Decreased function - therapeutic activities and home program    Therapy Evaluation Codes:   1) History comprised of:   Personal factors that impact the plan of care:      None.    Comorbidity factors that impact the plan of care are:      None.     Medications impacting care: None.  2) Examination of Body Systems comprised of:   Body structures and functions that impact the plan of care:      Lumbar spine.   Activity limitations that impact the plan of care are:      Sitting, Standing, Walking and Sleeping.  3) Clinical presentation characteristics are:   Evolving/Changing.  4) Decision-Making    Low complexity using standardized patient assessment instrument and/or measureable assessment of functional outcome.  Cumulative Therapy Evaluation is: Low complexity.    Previous and current functional limitations:  (See Goal Flow Sheet for this information)    Short term and Long term goals: (See Goal Flow Sheet for this information)     Communication ability:  Patient appears to be able to clearly communicate and understand verbal and written communication and follow directions correctly.  Treatment Explanation - The following has been discussed with the patient:   RX ordered/plan of care  Anticipated outcomes  Possible risks and side effects  This patient would benefit from PT intervention to resume normal activities.   Rehab potential is excellent.    Frequency:  1 X week,  once daily  Duration:  for 3 weeks  Discharge Plan:  Achieve all LTG.  Independent in home treatment program.  Reach maximal therapeutic benefit.    Please refer to the daily flowsheet for treatment today, total treatment time and time spent performing 1:1 timed codes.

## 2018-01-04 PROBLEM — G89.29 CHRONIC LEFT-SIDED LOW BACK PAIN WITH LEFT-SIDED SCIATICA: Status: ACTIVE | Noted: 2018-01-04

## 2018-01-04 PROBLEM — M54.42 CHRONIC LEFT-SIDED LOW BACK PAIN WITH LEFT-SIDED SCIATICA: Status: ACTIVE | Noted: 2018-01-04

## 2018-01-15 ENCOUNTER — TELEPHONE (OUTPATIENT)
Dept: PHYSICAL THERAPY | Facility: CLINIC | Age: 68
End: 2018-01-15

## 2018-01-15 DIAGNOSIS — M54.42 CHRONIC LEFT-SIDED LOW BACK PAIN WITH LEFT-SIDED SCIATICA: ICD-10-CM

## 2018-01-15 DIAGNOSIS — G89.29 CHRONIC LEFT-SIDED LOW BACK PAIN WITH LEFT-SIDED SCIATICA: ICD-10-CM

## 2018-01-15 NOTE — TELEPHONE ENCOUNTER
Pt called to update status from initial eval and increasing frequency of press ups over the past week.  States that groin pain is better but not completely resolved.  He has been performing press ups 6-7 x during day with self OP.  Has had 1-2 days over the past week where he has not noticed the groin pain.  Will still notice pain at night with turning but not as intense.  Instructed patient to continue with EIL but perform with hands on top of a couple rolled towels to promote end range extension to further reduce.  Pt verbalized understanding and will call to follow up over the next week.

## 2018-01-31 ENCOUNTER — THERAPY VISIT (OUTPATIENT)
Dept: PHYSICAL THERAPY | Facility: CLINIC | Age: 68
End: 2018-01-31
Payer: COMMERCIAL

## 2018-01-31 DIAGNOSIS — G89.29 CHRONIC LEFT-SIDED LOW BACK PAIN WITH LEFT-SIDED SCIATICA: ICD-10-CM

## 2018-01-31 DIAGNOSIS — M54.42 CHRONIC LEFT-SIDED LOW BACK PAIN WITH LEFT-SIDED SCIATICA: ICD-10-CM

## 2018-01-31 PROCEDURE — 97110 THERAPEUTIC EXERCISES: CPT | Mod: GP | Performed by: PHYSICAL THERAPIST

## 2018-03-06 ENCOUNTER — THERAPY VISIT (OUTPATIENT)
Dept: PHYSICAL THERAPY | Facility: CLINIC | Age: 68
End: 2018-03-06
Payer: COMMERCIAL

## 2018-03-06 DIAGNOSIS — M54.42 CHRONIC LEFT-SIDED LOW BACK PAIN WITH LEFT-SIDED SCIATICA: ICD-10-CM

## 2018-03-06 DIAGNOSIS — G89.29 CHRONIC LEFT-SIDED LOW BACK PAIN WITH LEFT-SIDED SCIATICA: ICD-10-CM

## 2018-03-06 PROCEDURE — 97110 THERAPEUTIC EXERCISES: CPT | Mod: GP | Performed by: PHYSICAL THERAPIST

## 2018-03-06 PROCEDURE — 97530 THERAPEUTIC ACTIVITIES: CPT | Mod: GP | Performed by: PHYSICAL THERAPIST

## 2018-03-06 NOTE — PROGRESS NOTES
Subjective:  HPI                    Objective:  System    Physical Exam    General     ROS    Assessment/Plan:    PROGRESS  REPORT    Progress reporting period is from 1.3.18 to 3.6.18.       SUBJECTIVE  Subjective changes noted by patient:  Pt reports pain overall is better.  Still notices some pain yet sitting for about 30 min mainly at work.  Occasionally will notice at night laying on (L) side.  He was on ski trip last week and did well during skiing with some twinges with turning but nothing that lingered worse afterwards.  Has been consistent with HEP 6 x daily but while on vacation only able to get to 4 x daily, however did not notice any negative effects as a result.    Current Pain level: 2/10.     Initial Pain level: 3/10.   Changes in function:  Yes (See Goal flowsheet attached for changes in current functional level)  Adverse reaction to treatment or activity: None    OBJECTIVE  L/S AROM:  Flex Min loss/ERP low back; Ext Min loss; SG (R) Min loss/ERP (L) groin (L) WNL/ERP (L) groin.    PROM (L) Hip:  Flex Min loss/ERP (L) groin; ER Min loss/ERP groin; IR Min loss/ERP (L) groin.    Strength:  Hip Abd (R) 5/5 (L) 4/5, pain; Ext 4+/5 (B).     ASSESSMENT/PLAN  Updated problem list and treatment plan: Diagnosis 1:  (L) Hip pain/LBP    Pain -  self management, education, directional preference exercise and home program  Decreased ROM/flexibility - manual therapy, therapeutic exercise and home program  Decreased joint mobility - manual therapy, therapeutic exercise and home program  Decreased strength - therapeutic exercise, therapeutic activities and home program  Impaired muscle performance - neuro re-education and home program  Decreased function - therapeutic activities  Impaired posture - neuro re-education and home program  STG/LTGs have been met or progress has been made towards goals:  Yes (See Goal flow sheet completed today.)  Assessment of Progress: The patient's condition is improving.  Self  Management Plans:  Patient has been instructed in a home treatment program.  Patient  has been instructed in self management of symptoms.  I have re-evaluated this patient and find that the nature, scope, duration and intensity of the therapy is appropriate for the medical condition of the patient.  Trent continues to require the following intervention to meet STG and LTG's:  PT    Recommendations:  This patient would benefit from continued therapy.     Frequency:  2 X a month, once daily  Duration:  for 1 month        Please refer to the daily flowsheet for treatment today, total treatment time and time spent performing 1:1 timed codes.

## 2018-03-07 ENCOUNTER — OFFICE VISIT (OUTPATIENT)
Dept: FAMILY MEDICINE | Facility: CLINIC | Age: 68
End: 2018-03-07
Payer: COMMERCIAL

## 2018-03-07 VITALS
OXYGEN SATURATION: 99 % | BODY MASS INDEX: 21.84 KG/M2 | TEMPERATURE: 97 F | WEIGHT: 156 LBS | SYSTOLIC BLOOD PRESSURE: 108 MMHG | HEART RATE: 55 BPM | DIASTOLIC BLOOD PRESSURE: 65 MMHG | HEIGHT: 71 IN

## 2018-03-07 DIAGNOSIS — M16.0 PRIMARY OSTEOARTHRITIS OF BOTH HIPS: ICD-10-CM

## 2018-03-07 DIAGNOSIS — L57.0 ACTINIC KERATOSES: ICD-10-CM

## 2018-03-07 DIAGNOSIS — Z00.00 ROUTINE GENERAL MEDICAL EXAMINATION AT A HEALTH CARE FACILITY: Primary | ICD-10-CM

## 2018-03-07 DIAGNOSIS — Z85.828 HISTORY OF SKIN CANCER: ICD-10-CM

## 2018-03-07 PROBLEM — M54.42 CHRONIC LEFT-SIDED LOW BACK PAIN WITH LEFT-SIDED SCIATICA: Status: RESOLVED | Noted: 2018-01-04 | Resolved: 2018-03-07

## 2018-03-07 PROBLEM — G89.29 CHRONIC LEFT-SIDED LOW BACK PAIN WITH LEFT-SIDED SCIATICA: Status: RESOLVED | Noted: 2018-01-04 | Resolved: 2018-03-07

## 2018-03-07 PROCEDURE — 99212 OFFICE O/P EST SF 10 MIN: CPT | Mod: 25 | Performed by: FAMILY MEDICINE

## 2018-03-07 PROCEDURE — 99397 PER PM REEVAL EST PAT 65+ YR: CPT | Performed by: FAMILY MEDICINE

## 2018-03-07 ASSESSMENT — ACTIVITIES OF DAILY LIVING (ADL)
CURRENT_FUNCTION: NO ASSISTANCE NEEDED
I_NEED_ASSISTANCE_FOR_THE_FOLLOWING_DAILY_ACTIVITIES:: NO ASSISTANCE IS NEEDED

## 2018-03-07 NOTE — MR AVS SNAPSHOT
After Visit Summary   3/7/2018    Trent Estrada    MRN: [id number]           Patient Information     Date Of Birth          1950        Visit Information        Provider Department      3/7/2018 4:40 PM Yonis Martin MD [facility]        Today's Diagnoses     Routine general medical examination at a health care facility    -  1    Primary osteoarthritis of both hips        Actinic keratoses        History of skin cancer          Care Instructions      Preventive Health Recommendations:       Male Ages 65 and over    Yearly exam:             See your health care provider every year in order to  o   Review health changes.   o   Discuss preventive care.    o   Review your medicines if your doctor has prescribed any.    Talk with your health care provider about whether you should have a test to screen for prostate cancer (PSA).    Every 3 years, have a diabetes test (fasting glucose). If you are at risk for diabetes, you should have this test more often.    Every 5 years, have a cholesterol test. Have this test more often if you are at risk for high cholesterol or heart disease.     Every 10 years, have a colonoscopy. Or, have a yearly FIT test (stool test). These exams will check for colon cancer.    Talk to with your health care provider about screening for Abdominal Aortic Aneurysm if you have a family history of AAA or have a history of smoking.  Shots:     Get a flu shot each year.     Get a tetanus shot every 10 years.     Talk to your doctor about your pneumonia vaccines. There are now two you should receive - Pneumovax (PPSV 23) and Prevnar (PCV 13).    Talk to your doctor about a shingles vaccine.     Talk to your doctor about the hepatitis B vaccine.  Nutrition:     Eat at least 5 servings of fruits and vegetables each day.     Eat whole-grain bread, whole-wheat pasta and brown rice instead of white grains and rice.     Talk to your doctor about Calcium and  "Vitamin D.   Lifestyle    Exercise for at least 150 minutes a week (30 minutes a day, 5 days a week). This will help you control your weight and prevent disease.     Limit alcohol to one drink per day.     No smoking.     Wear sunscreen to prevent skin cancer.     See your dentist every six months for an exam and cleaning.     See your eye doctor every 1 to 2 years to screen for conditions such as glaucoma, macular degeneration and cataracts.          Follow-ups after your visit        Follow-up notes from your care team     Return in about 1 year (around 3/7/2019).      Your next 10 appointments already scheduled     Mar 21, 2018  7:10 AM CDT   [facility] with Mario German, PT   [facility] [place] [facility] ([facility] [place])    [address]  [place] [address] [phone]              Who to contact     If you have questions or need follow up information about today's clinic visit or your schedule please contact [facility] directly at [phone].  Normal or non-critical lab and imaging results will be communicated to you by MyChart, letter or phone within 4 business days after the clinic has received the results. If you do not hear from us within 7 days, please contact the clinic through [employer] or phone. If you have a critical or abnormal lab result, we will notify you by phone as soon as possible.  Submit refill requests through [employer] or call your pharmacy and they will forward the refill request to us. Please allow 3 business days for your refill to be completed.          Additional Information About Your Visit        [employer] Information     [employer] lets you send messages to your doctor, view your test results, renew your prescriptions, schedule appointments and more. To sign up, go to [email or web address] . Click on \"Log in\" on the left side of the screen, which will take you to the Welcome page. Then click on \"Sign up Now\" on the " "right side of the page.     You will be asked to enter the access code listed below, as well as some personal information. Please follow the directions to create your username and password.     Your access code is: [email or web address]  Expires: 2018  5:18 PM     Your access code will  in 90 days. If you need help or a new code, please call your [facility] or [phone].        Care EveryWhere ID     This is your Care EveryWhere ID. This could be used by other organizations to access your [place] medical records  [id number]        Your Vitals Were     Pulse Temperature Height Pulse Oximetry BMI (Body Mass Index)       55 97  F (36.1  C) (Oral) 5' 10.67\" (1.795 m) 99% 21.96 kg/m2        Blood Pressure from Last 3 Encounters:   18 108/65   17 119/74   16 110/74    Weight from Last 3 Encounters:   18 156 lb (70.8 kg)   17 160 lb (72.6 kg)   16 171 lb 12 oz (77.9 kg)              Today, you had the following     No orders found for display       Primary Care Provider Office Phone # Fax #    Yonis Martin [phone]       [address] [facility] [address]        Equal Access to Services     GEOVANI ADNREWS : Hadii ana ku hadasho Somikeali, waaxda luqadaha, qaybta kaalmada adeegyada, elias donato. So [facility] [phone].    ATENCIÓN: Si habla español, tiene a june disposición servicios gratuitos de asistencia lingüística. Llame al [phone].    We comply with applicable federal civil rights laws and [place] laws. We do not discriminate on the basis of race, color, national origin, age, disability, sex, sexual orientation, or gender identity.            Thank you!     Thank you for choosing [facility]  for your care. Our goal is always to provide you with excellent care. Hearing back from our patients is one way we can continue to improve our services. Please take a few minutes to complete the " written survey that you may receive in the mail after your visit with us. Thank you!             Your Updated Medication List - Protect others around you: Learn how to safely use, store and throw away your medicines at www.disposemymeds.org.          This list is accurate as of 3/7/18  5:18 PM.  Always use your most recent med list.                   Brand Name Dispense Instructions for use Diagnosis    NO ACTIVE MEDICATIONS

## 2018-03-07 NOTE — NURSING NOTE
"Chief Complaint   Patient presents with     Wellness Visit       Initial /65 (BP Location: Right arm, Patient Position: Chair, Cuff Size: Adult Regular)  Pulse 55  Temp 97  F (36.1  C) (Oral)  Ht 5' 10.67\" (1.795 m)  Wt 156 lb (70.8 kg)  SpO2 99%  BMI 21.96 kg/m2 Estimated body mass index is 21.96 kg/(m^2) as calculated from the following:    Height as of this encounter: 5' 10.67\" (1.795 m).    Weight as of this encounter: 156 lb (70.8 kg).  Medication Reconciliation: complete   Edith Dickens CMA       "

## 2018-03-07 NOTE — PROGRESS NOTES
SUBJECTIVE:   Trent Estrada is a 67 year old male who presents for a Preventive Visit and further evaluation of his left hip pain.  Are you in the first 12 months of your Medicare coverage?  No    Physical   Annual:     Getting at least 3 servings of Calcium per day::  NO    Bi-annual eye exam::  Yes    Dental care twice a year::  Yes    Sleep apnea or symptoms of sleep apnea::  None    Diet::  Regular (no restrictions)    Frequency of exercise::  2-3 days/week    Duration of exercise::  30-45 minutes    Taking medications regularly::  Not Applicable    Medication side effects::  Not applicable    Additional concerns today::  No    Ability to successfully perform activities of daily living: no assistance needed  Home Safety:  No safety concerns identified  Hearing Impairment: difficulty following a conversation in a noisy restaurant or crowded room        Fall risk: no recent falls       COGNITIVE SCREEN  1) Repeat 3 items (Banana, Sunrise, Chair)    2) Clock draw: NORMAL  3) 3 item recall: Recalls 3 objects  Results: 3 items recalled: COGNITIVE IMPAIRMENT LESS LIKELY    Mini-CogTM Copyright FIORDALIZA Kimball. Licensed by the author for use in [facility]; reprinted with permission (julian@[facility]). All rights reserved.        Reviewed and updated as needed this visit by clinical staff  Tobacco  Allergies  Meds  Med Hx  Surg Hx  Fam Hx  Soc Hx        Reviewed and updated as needed this visit by Provider        Social History   Substance Use Topics     Smoking status: Never Smoker     Smokeless tobacco: Never Used     Alcohol use No       No flowsheet data found.No flowsheet data found.        Today's PHQ-2 Score:   PHQ-2 ( 1999 [employer]) 3/7/2018   Q1: Little interest or pleasure in doing things 0   Q2: Feeling down, depressed or hopeless 0   PHQ-2 Score 0   Q1: Little interest or pleasure in doing things Not at all   Q2: Feeling down, depressed or hopeless Not at all   PHQ-2 Score 0       Do you feel safe  in your environment - Yes    Do you have a Health Care Directive?: No: Advance care planning reviewed with patient; information given to patient to review.    Current providers sharing in care for this patient include:   Patient Care Team:  Yonis Martin MD as PCP - General    The following health maintenance items are reviewed in Epic and correct as of today:  Health Maintenance   Topic Date Due     AORTIC ANEURYSM SCREENING (SYSTEM ASSIGNED)  12/15/2015     FALL RISK ASSESSMENT  2018     CHACORTA QUESTIONNAIRE 1 YEAR  2018     PHQ-9 Q1YR  2018     INFLUENZA VACCINE (SYSTEM ASSIGNED)  2018     ADVANCE DIRECTIVE PLANNING Q5 YRS  2018     COLON CANCER SCREEN (SYSTEM ASSIGNED)  2018     TETANUS IMMUNIZATION (SYSTEM ASSIGNED)  2021     LIPID SCREEN Q5 YR MALE (SYSTEM ASSIGNED)  2022     PNEUMOCOCCAL  Completed     HEPATITIS C SCREENING  Completed     Patient Active Problem List   Diagnosis     Actinic keratoses     Advanced directives, counseling/discussion     History of skin cancer     Osteoarthritis of both hips     Past Surgical History:   Procedure Laterality Date     C UNLISTED VASCULAR ENDOSCOPY PROC      VAS REVERSAL     right ear skin CA removal and reconstruction  2012    had a skin graft from right side of neck, too     TONSILLECTOMY       VASECTOMY      VAS,REVERSAL,VAS       Social History   Substance Use Topics     Smoking status: Never Smoker     Smokeless tobacco: Never Used     Alcohol use No     Family History   Problem Relation Age of Onset     Hypertension Mother       age 94 of CVA     Hypertension Father      CANCER Father      Prostate Cancer Father      in mid 70's,  age 84 of pneumonia     Alzheimer Disease Father      CANCER Son          Current Outpatient Prescriptions   Medication Sig Dispense Refill     NO ACTIVE MEDICATIONS        No Known Allergies    He has been going to physical therapy for some left hip area pain.  It is mainly  "in his groin.  He is really not having much pain in the low back or down the leg at this point.  He did have x-rays of his pelvis in 2014 which showed bilateral hip arthritis.  He continues to see dermatology on a regular basis for his history of skin cancer and actinic keratoses.    Review of Systems  CONSTITUTIONAL: NEGATIVE for fever, chills, change in weight  INTEGUMENTARY/SKIN: NEGATIVE for worrisome rashes, moles or lesions  EYES: NEGATIVE for vision changes or irritation  ENT/MOUTH: NEGATIVE for ear, mouth and throat problems  RESP: NEGATIVE for significant cough or SOB  BREAST: NEGATIVE for masses, tenderness or discharge  CV: NEGATIVE for chest pain, palpitations or peripheral edema  GI: NEGATIVE for nausea, abdominal pain, heartburn, or change in bowel habits  : NEGATIVE for frequency, dysuria, or hematuria  MUSCULOSKELETAL: See above  NEURO: NEGATIVE for weakness, dizziness or paresthesias  ENDOCRINE: NEGATIVE for temperature intolerance, skin/hair changes  HEME: NEGATIVE for bleeding problems  PSYCHIATRIC: NEGATIVE for changes in mood or affect    OBJECTIVE:   /65 (BP Location: Right arm, Patient Position: Chair, Cuff Size: Adult Regular)  Pulse 55  Temp 97  F (36.1  C) (Oral)  Ht 5' 10.67\" (1.795 m)  Wt 156 lb (70.8 kg)  SpO2 99%  BMI 21.96 kg/m2 Estimated body mass index is 21.96 kg/(m^2) as calculated from the following:    Height as of this encounter: 5' 10.67\" (1.795 m).    Weight as of this encounter: 156 lb (70.8 kg).  Physical Exam  GENERAL: healthy, alert and no distress  EYES: Eyes grossly normal to inspection, PERRL and conjunctivae and sclerae normal  HENT: ear canals and TM's normal, nose and mouth without ulcers or lesions  NECK: no adenopathy, no asymmetry, masses, or scars and thyroid normal to palpation  RESP: lungs clear to auscultation - no rales, rhonchi or wheezes  CV: regular rate and rhythm, normal S1 S2, no S3 or S4, no murmur, click or rub, no peripheral edema and " peripheral pulses strong  ABDOMEN: soft, nontender, no hepatosplenomegaly, no masses and bowel sounds normal  RECTAL: normal sphincter tone, no rectal masses, prostate normal size, smooth, nontender without nodules or masses  MS: He has some pain in the left hip with range of motion, especially flexion and internal rotation  SKIN: Some actinic changes yet on his forehead and face  NEURO: Normal strength and tone, mentation intact and speech normal  PSYCH: mentation appears normal, affect normal/bright    ASSESSMENT / PLAN:       ICD-10-CM    1. Routine general medical examination at a health care facility Z00.00    2. Primary osteoarthritis of both hips M16.0    3. Actinic keratoses L57.0    4. History of skin cancer Z85.828      His blood pressure and other vital signs look great  Lab results from last year were all nice and normal, so we elected to pass on lab testing this year  He is up-to-date on routine vaccines -- we discussed the new Shingrix vaccine, but it's not yet available            We may want to give him that next year if indicated  I suspect his left hip/groin pain is primarily due to hip arthritis  He will continue with physical therapy for now, but I suggested follow-up if his pain does not significantly improve  We would likely obtain hip x-rays at that time and consider regular NSAID medication and/or a cortisone injection  He will continue with routine dermatology care  Plan a recheck in 1 year, or sooner prn      End of Life Planning:  Patient currently has an advanced directive: No.  I have verified the patient's ablity to prepare an advanced directive/make health care decisions.  Literature was provided to assist patient in preparing an advanced directive.    COUNSELING:  Reviewed preventive health counseling, as reflected in patient instructions       Regular exercise       Healthy diet/nutrition         Estimated body mass index is 21.96 kg/(m^2) as calculated from the following:    Height  "as of this encounter: 5' 10.67\" (1.795 m).    Weight as of this encounter: 156 lb (70.8 kg).     reports that he has never smoked. He has never used smokeless tobacco.      Appropriate preventive services were discussed with this patient, including applicable screening as appropriate for cardiovascular disease, diabetes, osteopenia/osteoporosis, and glaucoma.  As appropriate for age/gender, discussed screening for colorectal cancer, prostate cancer, breast cancer, and cervical cancer. Checklist reviewing preventive services available has been given to the patient.    Reviewed patients plan of care and provided an AVS. The Basic Care Plan (routine screening as documented in Health Maintenance) for Trent meets the Care Plan requirement. This Care Plan has been established and reviewed with the Patient.    Counseling Resources:  ATP IV Guidelines  Pooled Cohorts Equation Calculator  Breast Cancer Risk Calculator  FRAX Risk Assessment  ICSI Preventive Guidelines  Dietary Guidelines for Americans, 2010  USDA's MyPlate  ASA Prophylaxis  Lung CA Screening    Yonis Martin MD  [facility]      Answers for HPI/ROS submitted by the patient on 3/7/2018   PHQ-2 Score: 0    "

## 2018-03-21 ENCOUNTER — THERAPY VISIT (OUTPATIENT)
Dept: PHYSICAL THERAPY | Facility: CLINIC | Age: 68
End: 2018-03-21
Payer: COMMERCIAL

## 2018-03-21 DIAGNOSIS — M54.42 CHRONIC LEFT-SIDED LOW BACK PAIN WITH LEFT-SIDED SCIATICA: Primary | ICD-10-CM

## 2018-03-21 DIAGNOSIS — G89.29 CHRONIC LEFT-SIDED LOW BACK PAIN WITH LEFT-SIDED SCIATICA: Primary | ICD-10-CM

## 2018-03-21 PROCEDURE — 97110 THERAPEUTIC EXERCISES: CPT | Mod: GP | Performed by: PHYSICAL THERAPIST

## 2018-03-21 PROCEDURE — 97530 THERAPEUTIC ACTIVITIES: CPT | Mod: GP | Performed by: PHYSICAL THERAPIST

## 2018-04-25 ENCOUNTER — THERAPY VISIT (OUTPATIENT)
Dept: PHYSICAL THERAPY | Facility: CLINIC | Age: 68
End: 2018-04-25
Payer: COMMERCIAL

## 2018-04-25 DIAGNOSIS — G89.29 CHRONIC LEFT-SIDED LOW BACK PAIN WITH LEFT-SIDED SCIATICA: ICD-10-CM

## 2018-04-25 DIAGNOSIS — M54.42 CHRONIC LEFT-SIDED LOW BACK PAIN WITH LEFT-SIDED SCIATICA: ICD-10-CM

## 2018-04-25 PROCEDURE — 97110 THERAPEUTIC EXERCISES: CPT | Mod: GP | Performed by: PHYSICAL THERAPIST

## 2018-04-25 NOTE — PROGRESS NOTES
Subjective:  HPI                    Objective:  System    Physical Exam    General     ROS    Assessment/Plan:    DISCHARGE REPORT    Progress reporting period is from 1.3.18 to 4.25.18.       SUBJECTIVE  Subjective changes noted by patient:  Pt reports that hip pain has been improving.  Has not noticed any real pain with walking or golfing.  Sitting during day at work is when he can still notice it and sometimes in the am with driving to work.  Has been still consistent with HEP and was on vacation last week and did not get to stretches as frequently but did not have any negative effects as a result.    Current Pain level: 0/10.     Initial Pain level: 3/10.   Changes in function:  Yes (See Goal flowsheet attached for changes in current functional level)  Adverse reaction to treatment or activity: None    OBJECTIVE  L/S AROM:  Flex WNL; Ext WNL; SG WNL (B).  PROM w/OP:  Hip Flex WNL; IR WNL; ER WNL.  Strength:  Hip Abd 5-/5 (B); Hip Ext 5-/5 (B).     ASSESSMENT/PLAN  Updated problem list and treatment plan: Diagnosis 1:  LBP/(L) Hip Pain    Pain -  self management, education, directional preference exercise and home program  Decreased ROM/flexibility - manual therapy, therapeutic exercise and home program  Decreased joint mobility - manual therapy, therapeutic exercise and home program  Decreased strength - therapeutic exercise, therapeutic activities and home program  Impaired muscle performance - neuro re-education and home program  Impaired posture - neuro re-education and home program  STG/LTGs have been met or progress has been made towards goals:  Yes (See Goal flow sheet completed today.)  Assessment of Progress: The patient's condition is improving.  The patient has met all of their long term goals.  Self Management Plans:  Patient is independent in a home treatment program.  Patient is independent in self management of symptoms.  I have re-evaluated this patient and find that the nature, scope, duration  and intensity of the therapy is appropriate for the medical condition of the patient.  Trent continues to require the following intervention to meet STG and LTG's:  PT intervention is no longer required to meet STG/LTG.    Recommendations:  This patient is ready to be discharged from therapy and continue their home treatment program.    Please refer to the daily flowsheet for treatment today, total treatment time and time spent performing 1:1 timed codes.

## 2018-09-12 ENCOUNTER — THERAPY VISIT (OUTPATIENT)
Dept: PHYSICAL THERAPY | Facility: CLINIC | Age: 68
End: 2018-09-12
Payer: COMMERCIAL

## 2018-09-12 DIAGNOSIS — M25.552 HIP PAIN, LEFT: Primary | ICD-10-CM

## 2018-09-12 DIAGNOSIS — M54.50 CHRONIC LEFT-SIDED LOW BACK PAIN WITHOUT SCIATICA: ICD-10-CM

## 2018-09-12 DIAGNOSIS — G89.29 CHRONIC LEFT-SIDED LOW BACK PAIN WITHOUT SCIATICA: ICD-10-CM

## 2018-09-12 PROCEDURE — 97530 THERAPEUTIC ACTIVITIES: CPT | Mod: GP | Performed by: PHYSICAL THERAPIST

## 2018-09-12 PROCEDURE — 97161 PT EVAL LOW COMPLEX 20 MIN: CPT | Mod: GP | Performed by: PHYSICAL THERAPIST

## 2018-09-12 PROCEDURE — 97110 THERAPEUTIC EXERCISES: CPT | Mod: GP | Performed by: PHYSICAL THERAPIST

## 2018-09-12 NOTE — PROGRESS NOTES
[facility] Initial Evaluation -- Lumbar    Date: September 12, 2018  Trent Estrada is a 67 year old male with a lumbar spine and Lt hip condition.   Referral: GP  Work mechanical stresses:  Prolonged sitting  Employment status:  Working normal hours  Leisure mechanical stresses: Golfing, skiing in winter  Functional disability score (CANDIDO/STarT Back):  See CANDIDO/STarT back flowsheet  VAS score (0-10): 3/10  Patient goals/expectations:  Has been doing HEP from previous episode of PT as instructed since April but pain has not been changing.  Wants to see if any other things he can do with PT before looking at other options.    HISTORY:    Present symptoms: Lt ant hip, glut  Pain quality (sharp/shooting/stabbing/aching/burning/cramping):  achy   Paresthesia (yes/no):  No    Present since (onset date): November 2017.     Symptoms (improving/unchanging/worsening):  Unchanging since April 2018.     Symptoms commenced as a result of: No apparent reason   Condition occurred in the following environment:   NA     Symptoms at onset (back/thigh/leg): Lt groin  Constant symptoms (back/thigh/leg): None  Intermittent symptoms (back/thigh/leg): As above    Symptoms are made worse with the following: Always Sitting, Sometimes Walking, Always Lying and Time of day - Always AM, sometimes up > down stairs   Symptoms are made better with the following: Always On the move    Disturbed sleep (yes/no):  Yes, sometimes Sleeping postures (prone/sup/side R/L): Supine, prone    Previous episodes (0/1-5/6-10/11+): 1 Year of first episode: 3 yrs ago    Previous history: Yes  Previous treatments: PT (L/S Ext 3 x daily, Repeated Hip Ext/IR 2 x daily and has been doing this consistently since last PT visit in April)      Specific Questions:  Cough/Sneeze/Strain (pos/neg): Neg  Bowel/Bladder (normal/abnormal): Normal  Gait (normal/abnormal): Normal  Medications (nil/NSAIDS/analg/steroids/anticoag/other):  None  Medical  allergies:  None  General health (excellent/good/fair/poor):  Good  Pertinent medical history:  None  Imaging (None/Xray/MRI/Other):  None  Recent or major surgery (yes/no):  No  Night pain (yes/no): No  Accidents (yes/no): No  Unexplained weight loss (yes/no): No  Barriers at home: No  Other red flags: No    EXAMINATION    Posture:   Sitting (good/fair/poor): Good  Standing (good/fair/poor):Good  Lordosis (red/acc/normal):   Correction of posture (better/worse/no effect): No effect    Lateral Shift (right/left/nil): Nil  Relevant (yes/no):  NA  Other Observations:     Neurological:    Motor deficit:  NT  Reflexes:  NT  Sensory deficit:  NT  Dural signs:  NT    Movement Loss:   Armaan Mod Min Nil Pain   Flexion    X    Extension   X     Side Gliding R   X  Rt Groin   Side Gliding L   X  Rt Groin   PROM w/OP Lt Hip:  Flex - Min loss/ERP  ER - WNL  IR - Mod loss            Test Movements:   During: produces, abolishes, increases, decreases, no effect, centralizing, peripheralizing   After: better, worse, no better, no worse, no effect, centralized, peripheralized    Pretest symptoms standin-3/10 Lt groin walking   Symptoms During Symptoms After ROM increased ROM decreased No Effect   FIS        Rep FIS        EIS        Rep EIS        Pretest symptoms lyin/10 pain    Symptoms During Symptoms After ROM increased ROM decreased No Effect   KRISTIE        Rep KRISTIE        EIL No Effect    No Effect         Rep EIL No Effect    No Effect    X  Ext, (B) SGIS      Lt groin pain walking   If required, pretest symptoms:    Symptoms During Symptoms After ROM increased ROM decreased No Effect   SGIS - R        Rep SGIS - R        SGIS - L        Rep SGIS - L          Static Tests:  Sitting slouched:    Sitting erect:    Standing slouched   Standing erect:    Lying prone in extension:   Long sitting:      Other Tests: Repeated Hip Ext/IR (3 x 15-20 reps) - NE, NE, Dec pain walking, inc Passive flex and IR ROM w/less  pain.    Provisional Classification:  Derangement - Asymmetrical, unilateral, symptoms above knee    Principle of Management:  Education:  Posture (lumbar roll, chair adjustments)   Equipment provided:  None.  Has lumbar roll that he uses when sitting.  Mechanical therapy (Y/N):  Y   Extension principle:  EIL x 15-20 reps, 2-3 x daily.  Repeated Hip Ext/IR x 15 reps, 4-5 x daily    ASSESSMENT/PLAN:    Patient is a 67 year old male with lumbar and Lt Hip complaints.    Patient has the following significant findings with corresponding treatment plan.                Diagnosis 1:  LBP/Lt Hip Pain    Pain -  self management, education, directional preference exercise and home program  Decreased ROM/flexibility - manual therapy, therapeutic exercise and home program  Decreased joint mobility - manual therapy, therapeutic exercise and home program  Decreased strength - therapeutic exercise, therapeutic activities and home program  Impaired muscle performance - neuro re-education and home program  Decreased function - therapeutic activities and home program  Impaired posture - neuro re-education and home program    Therapy Evaluation Codes:   1) History comprised of:   Personal factors that impact the plan of care:      None.    Comorbidity factors that impact the plan of care are:      None.     Medications impacting care: None.  2) Examination of Body Systems comprised of:   Body structures and functions that impact the plan of care:      Hip and Lumbar spine.   Activity limitations that impact the plan of care are:      Driving, Sitting, Stairs, Walking and Sleeping.  3) Clinical presentation characteristics are:   Stable/Uncomplicated.  4) Decision-Making    Low complexity using standardized patient assessment instrument and/or measureable assessment of functional outcome.  Cumulative Therapy Evaluation is: Low complexity.    Previous and current functional limitations:  (See Goal Flow Sheet for this information)     Short term and Long term goals: (See Goal Flow Sheet for this information)     Communication ability:  Patient appears to be able to clearly communicate and understand verbal and written communication and follow directions correctly.  Treatment Explanation - The following has been discussed with the patient:   RX ordered/plan of care  Anticipated outcomes  Possible risks and side effects  This patient would benefit from PT intervention to resume normal activities.   Rehab potential is good.    Frequency:  2 X a month, once daily  Duration:  for 2 months  Discharge Plan:  Achieve all LTG.  Independent in home treatment program.  Reach maximal therapeutic benefit.    Please refer to the daily flowsheet for treatment today, total treatment time and time spent performing 1:1 timed codes.

## 2018-09-26 ENCOUNTER — THERAPY VISIT (OUTPATIENT)
Dept: PHYSICAL THERAPY | Facility: CLINIC | Age: 68
End: 2018-09-26
Payer: COMMERCIAL

## 2018-09-26 DIAGNOSIS — M54.50 CHRONIC LEFT-SIDED LOW BACK PAIN WITHOUT SCIATICA: ICD-10-CM

## 2018-09-26 DIAGNOSIS — M25.552 HIP PAIN, LEFT: ICD-10-CM

## 2018-09-26 DIAGNOSIS — G89.29 CHRONIC LEFT-SIDED LOW BACK PAIN WITHOUT SCIATICA: ICD-10-CM

## 2018-09-26 PROCEDURE — 97110 THERAPEUTIC EXERCISES: CPT | Mod: GP | Performed by: PHYSICAL THERAPIST

## 2018-10-09 ENCOUNTER — THERAPY VISIT (OUTPATIENT)
Dept: PHYSICAL THERAPY | Facility: CLINIC | Age: 68
End: 2018-10-09
Payer: COMMERCIAL

## 2018-10-09 DIAGNOSIS — M25.552 HIP PAIN, LEFT: ICD-10-CM

## 2018-10-09 DIAGNOSIS — M54.50 CHRONIC LEFT-SIDED LOW BACK PAIN WITHOUT SCIATICA: ICD-10-CM

## 2018-10-09 DIAGNOSIS — G89.29 CHRONIC LEFT-SIDED LOW BACK PAIN WITHOUT SCIATICA: ICD-10-CM

## 2018-10-09 PROCEDURE — 97110 THERAPEUTIC EXERCISES: CPT | Mod: GP | Performed by: PHYSICAL THERAPIST

## 2018-10-10 NOTE — PROGRESS NOTES
PROGRESS  REPORT    Progress reporting period is from 9.12.18 to 10.9.18.       SUBJECTIVE  Subjective changes noted by patient:  Pt reports that hip pain has improved over the past couple weeks but still notices with bouts of sitting and sometimes at night yet.  Has been doing unilateral press ups 3 x daily and kneeling stretch 3 x daily.      Current Pain level: 0/10.     Initial Pain level: 3/10.   Changes in function:  Yes (See Goal flowsheet attached for changes in current functional level)  Adverse reaction to treatment or activity: None    OBJECTIVE  Objective: L/S AROM:  Flex WNL; Ext Min loss; SG Rt Min loss/ERP groin Lt Min loss/ERP groin.  PROM Lt hip:  Flex WNL/ERP; ER WNL; IR Min loss/ERP      ASSESSMENT/PLAN  Updated problem list and treatment plan:   Diagnosis 1:  LBP/Lt Hip Pain    Pain -  self management, education, directional preference exercise and home program  Decreased ROM/flexibility - therapeutic exercise and home program  Decreased joint mobility - therapeutic exercise and home program  STG/LTGs have been met or progress has been made towards goals:  Yes (See Goal flow sheet completed today.)  Assessment of Progress: The patient's condition is improving.  Self Management Plans:  Patient is independent in a home treatment program.  Patient is independent in self management of symptoms.  I have re-evaluated this patient and find that the nature, scope, duration and intensity of the therapy is appropriate for the medical condition of the patient.  Trent continues to require the following intervention to meet STG and LTG's:  PT intervention is no longer required to meet STG/LTG.    Recommendations:  Pt will continue with HEP as instructed over the next 3 weeks.  If continuing to improve will continue with HEP ongoing for self management.  If sx's still lingering recommend pt follow up with MD to re-assess given length of time of sx's and discuss other treatment options to address possible  underlying inflammatory component.

## 2018-11-07 ENCOUNTER — OFFICE VISIT (OUTPATIENT)
Dept: FAMILY MEDICINE | Facility: CLINIC | Age: 68
End: 2018-11-07
Payer: COMMERCIAL

## 2018-11-07 VITALS
SYSTOLIC BLOOD PRESSURE: 119 MMHG | HEART RATE: 58 BPM | TEMPERATURE: 97.5 F | WEIGHT: 163 LBS | DIASTOLIC BLOOD PRESSURE: 76 MMHG | BODY MASS INDEX: 22.95 KG/M2 | OXYGEN SATURATION: 99 %

## 2018-11-07 DIAGNOSIS — M16.12 PRIMARY OSTEOARTHRITIS OF LEFT HIP: Primary | ICD-10-CM

## 2018-11-07 DIAGNOSIS — M75.41 IMPINGEMENT SYNDROME, SHOULDER, RIGHT: ICD-10-CM

## 2018-11-07 DIAGNOSIS — Z23 NEED FOR SHINGLES VACCINE: ICD-10-CM

## 2018-11-07 PROBLEM — M25.552 HIP PAIN, LEFT: Status: RESOLVED | Noted: 2018-09-12 | Resolved: 2018-11-07

## 2018-11-07 PROCEDURE — 99214 OFFICE O/P EST MOD 30 MIN: CPT | Mod: 25 | Performed by: FAMILY MEDICINE

## 2018-11-07 PROCEDURE — 90471 IMMUNIZATION ADMIN: CPT | Performed by: FAMILY MEDICINE

## 2018-11-07 PROCEDURE — 90750 HZV VACC RECOMBINANT IM: CPT | Performed by: FAMILY MEDICINE

## 2018-11-07 ASSESSMENT — PAIN SCALES - GENERAL: PAINLEVEL: MODERATE PAIN (5)

## 2018-11-07 NOTE — MR AVS SNAPSHOT
After Visit Summary   11/7/2018    Trent Estrada    MRN: [id number]           Patient Information     Date Of Birth          1950        Visit Information        Provider Department      11/7/2018 4:00 PM Yonis Martin MD [facility]        Today's Diagnoses     Primary osteoarthritis of left hip    -  1    Impingement syndrome, shoulder, right        Need for shingles vaccine           Follow-ups after your visit        Additional Services     TRAM PT, HAND, AND CHIROPRACTIC REFERRAL       Physical Therapy, Hand Therapy and Chiropractic Care are available through:  *[facility]  *Hand Therapy (Occupational Therapy or Physical Therapy)  *[facility]    Call one number to schedule at any of the above locations: [phone].    Physical therapy, Hand therapy and/or Chiropractic care has been recommended by your physician as an excellent treatment option to reduce pain and help people return to normal activities, including sports.  Therapy and/or chiropractic care services are a great complement or alternative to expensive and invasive surgery, injections, or long-term use of prescription medications. The primary goal is to identify the underlying problem and provide you the tools to manage your condition on your own.     Please be aware that coverage of these services is subject to the terms and limitations of your health insurance plan.  Call member services at your health plan with any benefit or coverage questions.      Please bring the following to your appointment:  *Your personal calendar for scheduling future appointments  *Comfortable clothing                  Your next 10 appointments already scheduled     Nov 14, 2018  7:00 AM CST   (Arrive by 6:45 AM)   TRAM Extremity with Jailene Gaffney PT   [facility] [place] [facility] ([facility] [place])    [address]  [place] [address]    [phone]              Future tests that were ordered for you today     Open Future Orders        Priority Expected Expires Ordered    TRAM PT, HAND, AND CHIROPRACTIC REFERRAL Routine  11/7/2019 11/7/2018            Who to contact     If you have questions or need follow up information about today's clinic visit or your schedule please contact [facility] directly at [phone].  Normal or non-critical lab and imaging results will be communicated to you by MyChart, letter or phone within 4 business days after the clinic has received the results. If you do not hear from us within 7 days, please contact the clinic through MyChart or phone. If you have a critical or abnormal lab result, we will notify you by phone as soon as possible.  Submit refill requests through [email or web address] or call your pharmacy and they will forward the refill request to us. Please allow 3 business days for your refill to be completed.          Additional Information About Your Visit        Care EveryWhere ID     This is your Care EveryWhere ID. This could be used by other organizations to access your [facility] medical records  [id number]        Your Vitals Were     Pulse Temperature Pulse Oximetry BMI (Body Mass Index)          58 97.5  F (36.4  C) (Oral) 99% 22.95 kg/m2         Blood Pressure from Last 3 Encounters:   11/07/18 119/76   03/07/18 108/65   02/01/17 119/74    Weight from Last 3 Encounters:   11/07/18 163 lb (73.9 kg)   03/07/18 156 lb (70.8 kg)   02/01/17 160 lb (72.6 kg)              We Performed the Following     HC ZOSTER VACCINE RECOMBINANT ADJUVANTED IM NJX     VACCINE ADMINISTRATION, INITIAL        Primary Care Provider Office Phone # Fax #    Yonis Martin [phone]       [address]  [facility] [address]        Equal Access to Services     GEOVANI ANDREWS AH: Rachel Topete, brittany foreman, elias nevarez  ah. So [facility] [phone].    ATENCIÓN: Si habla tammy, tiene a june disposición servicios gratuitos de asistencia lingüística. Geo al [phone].    We comply with applicable federal civil rights laws and [place] laws. We do not discriminate on the basis of race, color, national origin, age, disability, sex, sexual orientation, or gender identity.            Thank you!     Thank you for choosing [facility]  for your care. Our goal is always to provide you with excellent care. Hearing back from our patients is one way we can continue to improve our services. Please take a few minutes to complete the written survey that you may receive in the mail after your visit with us. Thank you!             Your Updated Medication List - Protect others around you: Learn how to safely use, store and throw away your medicines at www.disposemymeds.org.          This list is accurate as of 11/7/18  4:47 PM.  Always use your most recent med list.                   Brand Name Dispense Instructions for use Diagnosis    NO ACTIVE MEDICATIONS

## 2018-11-07 NOTE — NURSING NOTE
Screening Questionnaire for Adult Immunization    Are you sick today?   No   Do you have allergies to medications, food, a vaccine component or latex?   No   Have you ever had a serious reaction after receiving a vaccination?   No   Do you have a long-term health problem with heart disease, lung disease, asthma, kidney disease, metabolic disease (e.g. diabetes), anemia, or other blood disorder?   No   Do you have cancer, leukemia, HIV/AIDS, or any other immune system problem?   No   In the past 3 months, have you taken medications that affect  your immune system, such as prednisone, other steroids, or anticancer drugs; drugs for the treatment of rheumatoid arthritis, Crohn s disease, or psoriasis; or have you had radiation treatments?   No   Have you had a seizure, or a brain or other nervous system problem?   No   During the past year, have you received a transfusion of blood or blood     products, or been given immune (gamma) globulin or antiviral drug?   No   For women: Are you pregnant or is there a chance you could become        pregnant during the next month?   No   Have you received any vaccinations in the past 4 weeks?   No     Immunization questionnaire answers were all negative.        Per orders of Dr. Martin, injection of Shingrix given by Edith Dickens. Patient instructed to remain in clinic for 15 minutes afterwards, and to report any adverse reaction to me immediately.  Vaccine information supplied.       Screening performed by Edith Dickens on 11/7/2018 at 5:00 PM.

## 2018-11-07 NOTE — PROGRESS NOTES
SUBJECTIVE:   Trent Estrada is a 67 year old male who presents to clinic today for the following health issues:      Recheck of left hip pain. Pain is 5/10.   Right shoulder numbness/weakness -- needs a referral for PT.  Possible shingles vaccine.    He has been doing physical therapy for his left hip arthritis and it has been helpful.  He is not taking any medication for the discomfort.  The discomfort generally does not keep him from doing any routine activities.  He has been having some right shoulder numbness and weakness recently.  He had a skin graft from the right shoulder/neck area years ago and did have some numbness and weakness related to that.  He went through physical therapy and it helped a lot.  He wondered if he could try physical therapy again for this.  He has a little bit of discomfort when he abducts his right arm up.  We had talked about the new shingles vaccine back at his physical in March and he was interested in getting that.    Problem list and histories reviewed & adjusted, as indicated.  Additional history: as documented    Patient Active Problem List   Diagnosis     Actinic keratoses     Advanced directives, counseling/discussion     History of skin cancer     Osteoarthritis of both hips     Chronic left-sided low back pain without sciatica     Past Surgical History:   Procedure Laterality Date     C UNLISTED VASCULAR ENDOSCOPY PROC      VAS REVERSAL     right ear skin CA removal and reconstruction  2012    had a skin graft from right side of neck, too     TONSILLECTOMY       VASECTOMY      VAS,REVERSAL,VAS       Social History   Substance Use Topics     Smoking status: Never Smoker     Smokeless tobacco: Never Used     Alcohol use No     Family History   Problem Relation Age of Onset     Hypertension Mother       age 94 of CVA     Hypertension Father      Cancer Father      Prostate Cancer Father      in mid 70's,  age 84 of pneumonia     Alzheimer Disease Father       Cancer Son          Current Outpatient Prescriptions   Medication Sig Dispense Refill     NO ACTIVE MEDICATIONS        No Known Allergies    Reviewed and updated as needed this visit by clinical staff  Tobacco  Allergies  Meds  Med Hx  Surg Hx  Fam Hx  Soc Hx      Reviewed and updated as needed this visit by Provider         ROS:  Constitutional, HEENT, cardiovascular, pulmonary, gi and gu systems are negative, except as otherwise noted.    OBJECTIVE:     /76 (BP Location: Right arm, Patient Position: Chair, Cuff Size: Adult Regular)  Pulse 58  Temp 97.5  F (36.4  C) (Oral)  Wt 163 lb (73.9 kg)  SpO2 99%  BMI 22.95 kg/m2  Body mass index is 22.95 kg/(m^2).  GENERAL: healthy, alert and no distress  MS: he still has some discomfort in the left groin area with range of motion of the hip, especially external rotation and internal rotation.  He is limited in internal rotation.  He has some mild discomfort in the right shoulder when he abducts his arm above 90 degrees.    Diagnostic Test Results:  Previous hip x-ray results were reviewed    ASSESSMENT/PLAN:       ICD-10-CM    1. Primary osteoarthritis of left hip M16.12    2. Impingement syndrome, shoulder, right M75.41 TR[facility] PT, HAND, AND CHIROPRACTIC REFERRAL   3. Need for shingles vaccine Z23 HC ZOSTER VACCINE RECOMBINANT ADJUVANTED IM NJX     VACCINE ADMINISTRATION, INITIAL     Continue home exercises for the left hip arthritis  Try over-the-counter analgesics such as Tylenol, Advil, or Aleve as needed as well  Discussed possible role for prescription NSAID therapy in the future if needed/desired  I will refer him to physical therapy for instruction in a home exercise program for his right shoulder weakness and impingement symptoms  Discussed the new Shingrix vaccine and we will give him the first dose today  We will plan to give him the second dose at his physical in early March  Plan a recheck at that time, or sooner prn      Yonis Martin,  MD  [facility]

## 2018-11-12 ENCOUNTER — THERAPY VISIT (OUTPATIENT)
Dept: PHYSICAL THERAPY | Facility: CLINIC | Age: 68
End: 2018-11-12
Payer: COMMERCIAL

## 2018-11-12 DIAGNOSIS — M25.511 ACUTE PAIN OF RIGHT SHOULDER: Primary | ICD-10-CM

## 2018-11-12 DIAGNOSIS — M75.41 IMPINGEMENT SYNDROME, SHOULDER, RIGHT: ICD-10-CM

## 2018-11-12 PROCEDURE — 97110 THERAPEUTIC EXERCISES: CPT | Mod: GP | Performed by: PHYSICAL THERAPIST

## 2018-11-12 PROCEDURE — 97161 PT EVAL LOW COMPLEX 20 MIN: CPT | Mod: GP | Performed by: PHYSICAL THERAPIST

## 2018-11-12 NOTE — PROGRESS NOTES
[facility] Initial Evaluation -- Upper Extremity    Evaluation Date: November 12, 2018  Trent Estrada is a 67 year old male with a Rt shoulder condition.   Referral: GP  Work mechanical stresses:  - Computer, prolonged sitting  Employment status:  Working normal hours  Leisure mechanical stresses: Skiing  Functional disability score (SPADI): See SPADI  VAS score (0-10): 2-3/10  Handedness (R/L):  Rt  Patient goals/expectations:  Reduce pain in shoulder and reach overhead without pain    HISTORY    Present symptoms: Rt superior shoulder.    Pain quality (sharp/shooting/stabbing/aching/burning/cramping):  Achy, weakness    Present since (onset date):  October 2018 (3 weeks)   Symptoms (improving/unchanging/worsening):  improving.    Symptoms commenced as a result of: No apparent reason   Condition occurred in the following environment: Home    Symptoms at onset: As above  Paresthesia (yes/no):  No  Spinal history: stiffness but no worse the past 3 weeks  Cough/Sneeze (pos/neg):  Neg    Constant symptoms: None  Intermittent symptoms: As above    Symptoms are worse with the following: Always Reaching, Time of day - No effect and Other - Pushing/pulling   Symptoms are better with the following: Always When still    Continued use makes the pain (better/worse/no effect): no effect    Disturbed night (yes/no):  No    Pain at rest (yes/no): No  Site (neck/shoulder/elbow/wrist/hand): NA    Other questions (swelling/catching/clicking/locking/subluxing):  Catching/clicking    Previous episodes: Yes - Skin graft from Rt neck to reconstruct Rt ear 6 yrs ago  Previous treatments: PT 2013    Specific Questions:  General health (excellent/good/fair/poor):  Good  Pertinent medical history includes: None  Medications (nil/NSAIDS/analg/steroids/anticoag/other):  None  Medical allergies:  None  Imaging (None/Xray/MRI/Other): None  Recent or major surgery (yes/no): No  Night pain (yes/no):  No  Accidents (yes/no): No  Unexplained weight loss (yes/no): No  Barriers at home: None  Other red flags: None    Sites for physical examination (neck/shoulder/elbow/wrist/hand): neck/shoulder    EXAMINATION    Posture:  Sitting (good/fair/poor): Fair  Correction of posture (better/worse/no effect/NA): No effect  Standing (good/fair/poor):   Other observations:      Neurological (NA/motor/sensory/reflexes/dural): NT    Baselines (pain or functional activity): Painful reaching overhead out to side    Extremities (Shoulder/Elbow/Wrist/Hand): Shoulder    Movement Loss Armaan Mod Min Nil Pain   Flexion   X  PDM   Extension    X ERP   Abduction   X  PDM   Internal Rotation    X    External Rotation    X       Passive Movement (+/- overpressure)/(PDM/ERP):  NT  Resisted Test Response (pain): Flex 5-/5; Abd 4/5; IR 5/5; ER 5/5  Other Tests: NT    Spine:  Movement Loss: Flex WNL; Ext Min loss; Retr WNL; Rotation Rt Min loss Lt WNL  Effect of repeated movements: Retr/Ext -- Produce top Rt shoulder/neck, NW, Inc ROM w/dec pain, Inc FF and Abd strength  Effect of static positioning: NT  Spine testing (not relevant/relevant/secondary problem): Relevant    Baseline Symptoms: NA  Repeated Tests Symptom Response Mechanical Response   Active/Passive movement, resisted test, functional test During - Produce, Abolish, Increase, Decrease, NE After -   Better, Worse, NB, NW, NE Effect -   ? or ? ROM, strength or key functional test No Effect   NT due to screening C/S       Effect of static positioning       NT           Provisional Classification (Extremity/Spine): Spine - Derangement - Asymmetrical, unilateral, symptoms above elbow      Principle of Management:  Education:  Spine as source of shoulder sx's, specificity of exercise  Equipment provided:  None.  Uses lumbar roll during day.  Exercise and dosage:  Retr/ext x 10-15 reps, 5-6 x daily    ASSESSMENT/PLAN:    Patient is a 67 year old male with right side shoulder complaints.     Patient has the following significant findings with corresponding treatment plan.                Diagnosis 1:  Rt Shoulder pain with cervical component    Pain -  self management, education, directional preference exercise and home program  Decreased ROM/flexibility - manual therapy, therapeutic exercise and home program  Decreased joint mobility - manual therapy, therapeutic exercise and home program  Decreased strength - therapeutic exercise, therapeutic activities and home program  Impaired muscle performance - neuro re-education and home program  Decreased function - therapeutic activities and home program  Impaired posture - neuro re-education and home program    Therapy Evaluation Codes:   1) History comprised of:   Personal factors that impact the plan of care:      None.    Comorbidity factors that impact the plan of care are:      None.     Medications impacting care: None.  2) Examination of Body Systems comprised of:   Body structures and functions that impact the plan of care:      Cervical spine and Shoulder.   Activity limitations that impact the plan of care are:      reaching, pushing/pulling.  3) Clinical presentation characteristics are:   Stable/Uncomplicated.  4) Decision-Making    Low complexity using standardized patient assessment instrument and/or measureable assessment of functional outcome.  Cumulative Therapy Evaluation is: Low complexity.    Previous and current functional limitations:  (See Goal Flow Sheet for this information)    Short term and Long term goals: (See Goal Flow Sheet for this information)     Communication ability:  Patient appears to be able to clearly communicate and understand verbal and written communication and follow directions correctly.  Treatment Explanation - The following has been discussed with the patient:   RX ordered/plan of care  Anticipated outcomes  Possible risks and side effects  This patient would benefit from PT intervention to resume normal  activities.   Rehab potential is good.    Frequency:  1 X week, once daily  Duration:  for 6 weeks  Discharge Plan:  Achieve all LTG.  Independent in home treatment program.  Reach maximal therapeutic benefit.    Please refer to the daily flowsheet for treatment today, total treatment time and time spent performing 1:1 timed codes.

## 2018-11-19 ENCOUNTER — THERAPY VISIT (OUTPATIENT)
Dept: PHYSICAL THERAPY | Facility: CLINIC | Age: 68
End: 2018-11-19
Payer: COMMERCIAL

## 2018-11-19 DIAGNOSIS — M25.511 ACUTE PAIN OF RIGHT SHOULDER: ICD-10-CM

## 2018-11-19 PROCEDURE — 97110 THERAPEUTIC EXERCISES: CPT | Mod: GP | Performed by: PHYSICAL THERAPIST

## 2018-12-12 ENCOUNTER — THERAPY VISIT (OUTPATIENT)
Dept: PHYSICAL THERAPY | Facility: CLINIC | Age: 68
End: 2018-12-12
Payer: COMMERCIAL

## 2018-12-12 DIAGNOSIS — M25.511 ACUTE PAIN OF RIGHT SHOULDER: ICD-10-CM

## 2018-12-12 PROCEDURE — 97110 THERAPEUTIC EXERCISES: CPT | Mod: GP | Performed by: PHYSICAL THERAPIST

## 2018-12-27 ENCOUNTER — THERAPY VISIT (OUTPATIENT)
Dept: PHYSICAL THERAPY | Facility: CLINIC | Age: 68
End: 2018-12-27
Payer: COMMERCIAL

## 2018-12-27 DIAGNOSIS — M25.511 ACUTE PAIN OF RIGHT SHOULDER: Primary | ICD-10-CM

## 2018-12-27 PROCEDURE — 97110 THERAPEUTIC EXERCISES: CPT | Mod: GP | Performed by: PHYSICAL THERAPIST

## 2018-12-31 ENCOUNTER — TRANSFERRED RECORDS (OUTPATIENT)
Dept: HEALTH INFORMATION MANAGEMENT | Facility: CLINIC | Age: 68
End: 2018-12-31

## 2019-01-07 ENCOUNTER — THERAPY VISIT (OUTPATIENT)
Dept: CHIROPRACTIC MEDICINE | Facility: CLINIC | Age: 69
End: 2019-01-07
Payer: COMMERCIAL

## 2019-01-07 DIAGNOSIS — M62.838 SPASM OF MUSCLE: ICD-10-CM

## 2019-01-07 DIAGNOSIS — M99.15 VERTEBRAL SUBLUXATION COMPLEX OF PELVIC REGION: Primary | ICD-10-CM

## 2019-01-07 DIAGNOSIS — M99.13 VERTEBRAL SUBLUXATION COMPLEX OF LUMBAR REGION: ICD-10-CM

## 2019-01-07 DIAGNOSIS — R10.32 LEFT GROIN PAIN: ICD-10-CM

## 2019-01-07 PROCEDURE — 98940 CHIROPRACT MANJ 1-2 REGIONS: CPT | Mod: AT | Performed by: CHIROPRACTOR

## 2019-01-07 PROCEDURE — 99203 OFFICE O/P NEW LOW 30 MIN: CPT | Mod: 25 | Performed by: CHIROPRACTOR

## 2019-01-07 PROCEDURE — 97810 ACUP 1/> WO ESTIM 1ST 15 MIN: CPT | Performed by: CHIROPRACTOR

## 2019-01-07 NOTE — PROGRESS NOTES
Initial Chiropractic Clinic Visit    PCP: Yonis Martin    Trent Estrada is a 68 year old male who is seen  as a self referral presenting with left hp pain . Patient reports that the onset was aout 6 years ago insidiously. When asked, patient denies:, falling, slipping, bending and reaching or sleeping awkwardly. The pain is wrapping around his iliac crest and into his groin in his left leg.  He currently rates the pain at a 3 out of 10 and describes it as an annoyance. Sitting makes it worse.  There are no other radiating symptoms and this is interfering with his sleep    Injury: none    Location of Pain: left groin at the following level(s) L4  and L5   Duration of Pain: 6 year(s)  Rating of Pain at worst: 7/10  Rating of Pain Currently: 3/10  Symptoms are better with: Rest  Symptoms are worse with: sitting  Additional Features:      Health History  as reported by the patient:    How does the patient rate their own health:   Good    Current or past medical history:   No red flags identified    Medical allergies  None    Past Traumas/Surgeries  None    Family History  The family history includes Alzheimer Disease in his father; Cancer in his father and son; Hypertension in his father and mother; Prostate Cancer in his father.    Medications:  None    Occupation:      Primary job tasks:   Computer work and Prolonged sitting    Barriers as home/work:   none    Additional health Issues:           Review of Systems  Musculoskeletal: as above  Remainder of review of systems is negative including constitutional, CV, pulmonary, GI, Skin and Neurologic except as noted in HPI or medical history.    Past Medical History:   Diagnosis Date     Actinic keratoses     sees derm q 4 mo's for this     AR (allergic rhinitis)      Osteoarthritis of both hips 7/14     Skin cancer     multiple -- forehead, right ear, etc.     Past Surgical History:   Procedure Laterality Date     C UNLISTED VASCULAR ENDOSCOPY PROC       "VAS REVERSAL     right ear skin CA removal and reconstruction  fall 2012    had a skin graft from right side of neck, too     TONSILLECTOMY       VASECTOMY      VAS,REVERSAL,VAS     Objective  There were no vitals taken for this visit.      GENERAL APPEARANCE: healthy, alert and no distress   GAIT: NORMAL  SKIN: no suspicious lesions or rashes  NEURO: Normal strength and tone, mentation intact and speech normal  PSYCH:  mentation appears normal and affect normal/bright    Low back exam:    Inspection:  \"     no visible deformity in the low back       normal skin\",    ROM:       full flexion       full extension       Rotation of spine did reproduce some groin pain    Tender:       paraspinal muscles    Non Tender:       remainder of lumbar spine    Strength:       hip flexion 5/5 Normal       knee extension 5/5 Normal       ankle dorsiflexion 5/5 Normal       ankle plantarflexion 5/5 Normal       dorsiflexion of the great toe 5/5 Normal    Reflexes:       patellar (L3, L4) 2 bilaterally    Sensation:      grossly intact throughout lower extremities    Special tests:  SLR - Right negative and Left negative, Fabere - Right negative and Left negative, Yeoman's - Right negative and Left negative, Sarah - Right negative and Left negative and Ely's - Right negative and Left negative    Segmental spinal dysfunction/restrictions found at:  L4 , L5  and PSIS Right     The following soft tissue hypotonicities were observed:Piriformis: right, referred pain: no    Trigger points were found in:Piriformis    Muscle spasm found in:Piriformis     Hip range of motion was full and pain free.    No tenderness to palpation      Radiology:      Assessment:    1. Vertebral subluxation complex of pelvic region    2. Left groin pain    3. Vertebral subluxation complex of lumbar region    4. Spasm of muscle        RX ordered/plan of care  Anticipated outcomes  Possible risks and side effects    After discussing the risk and benefits of " care, patient consented to treatment    Prognosis: Guarded      Patient's condition:  Patient had restrictions pre-manipulation    Treatment effectiveness:  Post manipulation there is better intersegmental movement and Patient claims to feel looser post manipulation      Plan:    Procedures:  Evaluation and Management:  09064 Moderate level exam 30 min    CMT:  50279 Chiropractic manipulative treatment 1-2 regions performed   Lumbar: Activator, L4, L5, Prone  Pelvis: Diversified, PSIS Right , Prone    Modalities:  09787: Acupuncture, for 15 minutes:  Points: B25, B27, GV3, K3, B62, SI3  Ahsi point in piriformis  For 15 minutes    Therapeutic procedures:  None    Response to Treatment  Reduction in symptoms as reported by patient      Treatment plan and goals:  Goals:  SITTING: the patient specific goal is to attain pre-injury status of  6 hours comfortably    Frequency of care  Duration of care is estimated to be 8 weeks, from the initial treatment.  It is estimated that the patient will need a total of 8 visits to resolve this episode.  For the initial therapeutic trial of care, the frequency is recommended at 1 X week, once daily.  A reevaluation would be clinically appropriate in 8 visits, to determine progress and further course of care.    In-Office Treatment  Evaluation  Spinal Chiropractic Manipulative Therapy:    Acupuncture:          Recommendations:    Instructions:  ice 20 minutes every other hour as needed    Follow-up:    Return to care in one week.       Discussed the assessment with the patient.      Disclaimer: This note consists of symbols derived from keyboarding, dictation and/or voice recognition software. As a result, there may be errors in the script that have gone undetected. Please consider this when interpreting information found in this chart.

## 2019-01-09 ENCOUNTER — THERAPY VISIT (OUTPATIENT)
Dept: PHYSICAL THERAPY | Facility: CLINIC | Age: 69
End: 2019-01-09
Payer: COMMERCIAL

## 2019-01-09 DIAGNOSIS — M25.511 ACUTE PAIN OF RIGHT SHOULDER: ICD-10-CM

## 2019-01-09 PROCEDURE — 97530 THERAPEUTIC ACTIVITIES: CPT | Mod: GP | Performed by: PHYSICAL THERAPIST

## 2019-01-09 PROCEDURE — 97110 THERAPEUTIC EXERCISES: CPT | Mod: GP | Performed by: PHYSICAL THERAPIST

## 2019-01-14 ENCOUNTER — THERAPY VISIT (OUTPATIENT)
Dept: CHIROPRACTIC MEDICINE | Facility: CLINIC | Age: 69
End: 2019-01-14
Payer: COMMERCIAL

## 2019-01-14 DIAGNOSIS — M54.50 LUMBAGO: ICD-10-CM

## 2019-01-14 DIAGNOSIS — M99.05 SEGMENTAL DYSFUNCTION OF PELVIC REGION: Primary | ICD-10-CM

## 2019-01-14 DIAGNOSIS — M99.03 SEGMENTAL DYSFUNCTION OF LUMBAR REGION: ICD-10-CM

## 2019-01-14 DIAGNOSIS — M62.838 SPASM OF MUSCLE: ICD-10-CM

## 2019-01-14 PROCEDURE — 97110 THERAPEUTIC EXERCISES: CPT | Performed by: CHIROPRACTOR

## 2019-01-14 PROCEDURE — 98940 CHIROPRACT MANJ 1-2 REGIONS: CPT | Mod: AT | Performed by: CHIROPRACTOR

## 2019-01-14 NOTE — PROGRESS NOTES
Visit #:  2    Subjective:  Trent Estrada is a 68 year old male who is seen in f/u up for:        Segmental dysfunction of pelvic region  Lumbago  Segmental dysfunction of lumbar region  Spasm of muscle.     Since last visit on 1/7/2019,  Trent Estrada reports:    Area of chief complaint:  Lumbar :  Symptoms are graded at 3/10. The quality is described as stiff, achey.  Motion has remained about the same, no improvement. Patient feels that they have not improved and feel the same. Trent reports that he is feeling about the same.  He is still feeling tight and sore.      Objective:  The following was observed:    P: palpatory tenderness Piriformis L>>R  A: static palpation demonstrates intersegmental asymmetry , lumbar, pelvis  R: motion palpation notes restricted motion, L4 , L5  and PSIS Right   T: hypertonicity at: Piriformis L>>R    Segmental spinal dysfunction/restrictions found at:  L4 , L5  and PSIS Right       Assessment:    Diagnoses:      1. Segmental dysfunction of pelvic region    2. Lumbago    3. Segmental dysfunction of lumbar region    4. Spasm of muscle        Patient's condition:  Patient had restrictions pre-manipulation    Treatment effectiveness:  Post manipulation there is better intersegmental movement and Patient claims to feel looser post manipulation      Procedures:  CMT:  27070 Chiropractic manipulative treatment 1-2 regions performed   Lumbar: Activator, L4, L5, Prone  Pelvis: Drop Table, PSIS Right , Prone    Modalities:  43168: MSTM:  To Piriformis  for 5 min    Therapeutic procedures:  72273: Therapeutic Exercises  Direct one-on-one treatment to develop flexibilty, range of motion, strength and endurance.   The following were demonstrated and practiced:   Stretches - Reviewed stretches today.  Crossed leg piriformis stretch  Opposite knee to opposite shoulder  Provider assisted stretches to piriformis    Response to Treatment  No Change in symptoms     Prognosis:  Good    Progress towards Goals: Patient is making progress towards the goal.     Recommendations:    Instructions:  stretch as instructed at visit    Follow-up:    Return to care in one week.

## 2019-01-23 ENCOUNTER — THERAPY VISIT (OUTPATIENT)
Dept: PHYSICAL THERAPY | Facility: CLINIC | Age: 69
End: 2019-01-23
Payer: COMMERCIAL

## 2019-01-23 DIAGNOSIS — M25.511 ACUTE PAIN OF RIGHT SHOULDER: ICD-10-CM

## 2019-01-23 PROCEDURE — 97530 THERAPEUTIC ACTIVITIES: CPT | Mod: GP | Performed by: PHYSICAL THERAPIST

## 2019-01-23 PROCEDURE — 97110 THERAPEUTIC EXERCISES: CPT | Mod: GP | Performed by: PHYSICAL THERAPIST

## 2019-01-23 NOTE — PROGRESS NOTES
PROGRESS  REPORT    Progress reporting period is from 11.12.18 to 1.23.19.       SUBJECTIVE  Subjective changes noted by patient:  Pt states overall shoulder pain is improving.  Noticing less frequency of N/T in arm compared to last visit.  Is still aware at times with reaching overhead and N/T can come at random times or at times when driving or using mouse when working on computer at work.  Has been doing HEP 3 x daily.    Current Pain level: 0/10.     Initial Pain level: 3/10.   Changes in function:  Yes (See Goal flowsheet attached for changes in current functional level)  Adverse reaction to treatment or activity: None    OBJECTIVE  Objective: See cerv ROM below.  Sh AROM:  Flex WNL; Abd WNL.  Strength:  Rt sh Abd 4+/5, painful      ASSESSMENT/PLAN  Updated problem list and treatment plan:   Diagnosis 1:  Rt Shoulder pain with cervical component    Pain -  self management, education, directional preference exercise and home program  Decreased ROM/flexibility - manual therapy, therapeutic exercise and home program  Decreased joint mobility - manual therapy, therapeutic exercise and home program  Decreased strength - therapeutic exercise, therapeutic activities and home program  Impaired muscle performance - neuro re-education and home program  Decreased function - therapeutic activities and home program  STG/LTGs have been met or progress has been made towards goals:  Yes (See Goal flow sheet completed today.)  Assessment of Progress: The patient's condition is improving.  Self Management Plans:  Patient has been instructed in a home treatment program.  Patient  has been instructed in self management of symptoms.  I have re-evaluated this patient and find that the nature, scope, duration and intensity of the therapy is appropriate for the medical condition of the patient.  Trent continues to require the following intervention to meet STG and LTG's:  PT    Recommendations:  This patient would benefit from  continued therapy.     Frequency:  1 X a month, once daily  Duration:  for 2 months

## 2019-01-24 ENCOUNTER — THERAPY VISIT (OUTPATIENT)
Dept: CHIROPRACTIC MEDICINE | Facility: CLINIC | Age: 69
End: 2019-01-24
Payer: COMMERCIAL

## 2019-01-24 DIAGNOSIS — M54.50 LUMBAGO: ICD-10-CM

## 2019-01-24 DIAGNOSIS — M62.838 SPASM OF MUSCLE: ICD-10-CM

## 2019-01-24 DIAGNOSIS — M99.03 SEGMENTAL DYSFUNCTION OF LUMBAR REGION: ICD-10-CM

## 2019-01-24 DIAGNOSIS — M99.05 SEGMENTAL DYSFUNCTION OF PELVIC REGION: Primary | ICD-10-CM

## 2019-01-24 PROCEDURE — 98940 CHIROPRACT MANJ 1-2 REGIONS: CPT | Mod: AT | Performed by: CHIROPRACTOR

## 2019-01-24 NOTE — PROGRESS NOTES
Visit #:  3    Subjective:  Trent Estrada is a 68 year old male who is seen in f/u up for:        Segmental dysfunction of pelvic region  Lumbago  Segmental dysfunction of lumbar region  Spasm of muscle.     Since last visit on 1/14/2019,  Trent Estrada reports:    Area of chief complaint:  Lumbar :  Symptoms are graded at 3/10. The quality is described as stiff, achey.  Motion has remained about the same, no improvement. Patient feels that they have not improved and feel the same. Trent reports that he is feeling about the same.  He is still feeling tight and sore.  After discussing this and his lack of progress, it was suggested that orthopedic evaluation would be a better next step.      Objective:  The following was observed:    P: palpatory tenderness Piriformis L>>R  A: static palpation demonstrates intersegmental asymmetry , lumbar, pelvis  R: motion palpation notes restricted motion, L4 , L5  and PSIS Right   T: hypertonicity at: Piriformis L>>R    Segmental spinal dysfunction/restrictions found at:  L4 , L5  and PSIS Right       Assessment:    Diagnoses:      1. Segmental dysfunction of pelvic region    2. Lumbago    3. Segmental dysfunction of lumbar region    4. Spasm of muscle        Patient's condition:  Patient had restrictions pre-manipulation    Treatment effectiveness:  Post manipulation there is better intersegmental movement and Patient claims to feel looser post manipulation      Procedures:  CMT:  59894 Chiropractic manipulative treatment 1-2 regions performed   Lumbar: Activator, L4, L5, Prone  Pelvis: Drop Table, PSIS Right , Prone    Modalities:  44990: MSTM:  To Piriformis  for 5 min    Therapeutic procedures:  70659: Therapeutic Exercises  Direct one-on-one treatment to develop flexibilty, range of motion, strength and endurance.   The following were demonstrated and practiced:   Stretches - Reviewed stretches today.  Crossed leg piriformis stretch  Opposite knee to opposite  shoulder  Provider assisted stretches to piriformis    Response to Treatment  No Change in symptoms     Prognosis: Good    Progress towards Goals: Patient is making progress towards the goal.     Recommendations:    Instructions:  stretch as instructed at visit    Follow-up:    Follow up with ortho

## 2019-02-13 ENCOUNTER — THERAPY VISIT (OUTPATIENT)
Dept: PHYSICAL THERAPY | Facility: CLINIC | Age: 69
End: 2019-02-13
Payer: COMMERCIAL

## 2019-02-13 DIAGNOSIS — M25.511 ACUTE PAIN OF RIGHT SHOULDER: ICD-10-CM

## 2019-02-13 PROCEDURE — 97110 THERAPEUTIC EXERCISES: CPT | Mod: GP | Performed by: PHYSICAL THERAPIST

## 2019-02-13 NOTE — PROGRESS NOTES
DISCHARGE REPORT    Progress reporting period is from 11.12.18 to 2.12.19.       SUBJECTIVE  Subjective changes noted by patient:  Pt reports overall shoulder pain is better.  Does not notice pain in shoulder with day to day activities.  Still gets intermittent tingling in hand but this is less frequent since last visit and has days where he does not notice it.    Current Pain level: 0/10.     Initial Pain level: 3/10.   Changes in function:  Yes (See Goal flowsheet attached for changes in current functional level)  Adverse reaction to treatment or activity: None    OBJECTIVE  Objective: Cerv ROM:  Flex WNL; Ext WNL; Rotation Rt Slight restriction Lt WNL.  Sh AROM:  Flex and Abd WNL/pain free.  Strength:  Sh Flex 5/5 (B); ER 5/5 (B); IR 5/5 (B); Abd Rt 5-/5 Lt 5/5      ASSESSMENT/PLAN  Updated problem list and treatment plan:   Diagnosis 1:  Rt shoulder pain with cervical component    Decreased ROM/flexibility - therapeutic exercise and home program  Decreased joint mobility - therapeutic exercise and home program  Decreased strength - therapeutic exercise, therapeutic activities and home program  Impaired muscle performance - neuro re-education and home program  STG/LTGs have been met or progress has been made towards goals:  Yes (See Goal flow sheet completed today.)  Assessment of Progress: The patient's condition is improving.  The patient has met all of their long term goals.  Self Management Plans:  Patient is independent in a home treatment program.  Patient is independent in self management of symptoms.  I have re-evaluated this patient and find that the nature, scope, duration and intensity of the therapy is appropriate for the medical condition of the patient.  Trent continues to require the following intervention to meet STG and LTG's:  PT intervention is no longer required to meet STG/LTG.    Recommendations:  This patient is ready to be discharged from therapy and continue their home treatment  program.

## 2019-03-27 ENCOUNTER — OFFICE VISIT (OUTPATIENT)
Dept: FAMILY MEDICINE | Facility: CLINIC | Age: 69
End: 2019-03-27
Payer: COMMERCIAL

## 2019-03-27 VITALS
HEIGHT: 70 IN | TEMPERATURE: 97.5 F | BODY MASS INDEX: 23.05 KG/M2 | WEIGHT: 161 LBS | OXYGEN SATURATION: 97 % | HEART RATE: 57 BPM | DIASTOLIC BLOOD PRESSURE: 72 MMHG | SYSTOLIC BLOOD PRESSURE: 117 MMHG

## 2019-03-27 DIAGNOSIS — L57.0 ACTINIC KERATOSES: ICD-10-CM

## 2019-03-27 DIAGNOSIS — Z23 NEED FOR SHINGLES VACCINE: ICD-10-CM

## 2019-03-27 DIAGNOSIS — M16.0 PRIMARY OSTEOARTHRITIS OF BOTH HIPS: ICD-10-CM

## 2019-03-27 DIAGNOSIS — R10.32 LEFT GROIN PAIN: ICD-10-CM

## 2019-03-27 DIAGNOSIS — Z00.00 ROUTINE GENERAL MEDICAL EXAMINATION AT A HEALTH CARE FACILITY: Primary | ICD-10-CM

## 2019-03-27 DIAGNOSIS — Z85.828 HISTORY OF SKIN CANCER: ICD-10-CM

## 2019-03-27 PROBLEM — G89.29 CHRONIC LEFT-SIDED LOW BACK PAIN WITHOUT SCIATICA: Status: RESOLVED | Noted: 2018-09-12 | Resolved: 2019-03-27

## 2019-03-27 PROBLEM — M54.50 CHRONIC LEFT-SIDED LOW BACK PAIN WITHOUT SCIATICA: Status: RESOLVED | Noted: 2018-09-12 | Resolved: 2019-03-27

## 2019-03-27 PROCEDURE — 90471 IMMUNIZATION ADMIN: CPT | Performed by: FAMILY MEDICINE

## 2019-03-27 PROCEDURE — 99213 OFFICE O/P EST LOW 20 MIN: CPT | Mod: 25 | Performed by: FAMILY MEDICINE

## 2019-03-27 PROCEDURE — 99397 PER PM REEVAL EST PAT 65+ YR: CPT | Mod: 25 | Performed by: FAMILY MEDICINE

## 2019-03-27 PROCEDURE — 90750 HZV VACC RECOMBINANT IM: CPT | Performed by: FAMILY MEDICINE

## 2019-03-27 ASSESSMENT — ENCOUNTER SYMPTOMS
PALPITATIONS: 0
DIARRHEA: 0
ARTHRALGIAS: 1
MYALGIAS: 0
HEARTBURN: 0
HEADACHES: 0
HEMATURIA: 0
DIZZINESS: 0
ABDOMINAL PAIN: 0
SHORTNESS OF BREATH: 0
NERVOUS/ANXIOUS: 0
HEMATOCHEZIA: 0
JOINT SWELLING: 0
FREQUENCY: 0
CHILLS: 0
FEVER: 0
COUGH: 0
SORE THROAT: 0
NAUSEA: 0
DYSURIA: 0
EYE PAIN: 0
PARESTHESIAS: 0
CONSTIPATION: 0

## 2019-03-27 ASSESSMENT — MIFFLIN-ST. JEOR: SCORE: 1512.16

## 2019-03-27 ASSESSMENT — ACTIVITIES OF DAILY LIVING (ADL): CURRENT_FUNCTION: NO ASSISTANCE NEEDED

## 2019-03-27 NOTE — NURSING NOTE
Screening Questionnaire for Adult Immunization    Are you sick today?   No   Do you have allergies to medications, food, a vaccine component or latex?   No   Have you ever had a serious reaction after receiving a vaccination?   No   Do you have a long-term health problem with heart disease, lung disease, asthma, kidney disease, metabolic disease (e.g. diabetes), anemia, or other blood disorder?   No   Do you have cancer, leukemia, HIV/AIDS, or any other immune system problem?   No   In the past 3 months, have you taken medications that affect  your immune system, such as prednisone, other steroids, or anticancer drugs; drugs for the treatment of rheumatoid arthritis, Crohn s disease, or psoriasis; or have you had radiation treatments?   No   Have you had a seizure, or a brain or other nervous system problem?   No   During the past year, have you received a transfusion of blood or blood     products, or been given immune (gamma) globulin or antiviral drug?   No   For women: Are you pregnant or is there a chance you could become        pregnant during the next month?   No   Have you received any vaccinations in the past 4 weeks?   No     Immunization questionnaire answers were all negative.        Per orders of Dr. Martin, injection of Shingrix given by Edith Dickens. Patient instructed to remain in clinic for 15 minutes afterwards, and to report any adverse reaction to me immediately.  Prior to injection, verified patient identity using patient's name and date of birth.  Due to injection administration, patient instructed to remain in clinic for 15 minutes  afterwards, and to report any adverse reaction to me immediately.    Shingrix    Drug Amount Wasted:  None.  Vial/Syringe: Single dose vial  Expiration Date:  2/22/21  Vaccine information supplied.         Screening performed by Edith Dickens on 3/27/2019 at 4:42 PM.

## 2019-03-27 NOTE — PROGRESS NOTES
"SUBJECTIVE:   CC: Trent Estrada is an 68 year old male who presents for preventative health visit.     Annual Wellness Visit     In general, how would you rate your overall health?  Good    Frequency of exercise:  2-3 days/week    Do you usually eat at least 4 servings of fruit and vegetables a day, include whole grains    & fiber and avoid regularly eating high fat or \"junk\" foods?  No    Taking medications regularly:  Not Applicable    Medication side effects:  Not applicable    Ability to successfully perform activities of daily living:  No assistance needed    Home Safety:  No safety concerns identified    Hearing Impairment:  Difficulty following a conversation in a noisy restaurant or crowded room    In the past 6 months, have you been bothered by leaking of urine?  No    In general, how would you rate your overall mental or emotional health?  Excellent    PHQ-2 Total Score: 0    Additional concerns today:  No    {Add if <65 person on Medicare  - Required Questions (Optional):933040}  {Outside tests to abstract? :028303}    {additional problems to add (Optional):473280}    Today's PHQ-2 Score:   PHQ-2 ( 1999 [employer]) 3/27/2019   Q1: Little interest or pleasure in doing things 0   Q2: Feeling down, depressed or hopeless 0   PHQ-2 Score 0   Q1: Little interest or pleasure in doing things Not at all   Q2: Feeling down, depressed or hopeless Not at all   PHQ-2 Score 0       Abuse: Current or Past(Physical, Sexual or Emotional)- {YES/NO/NA:702083}  Do you feel safe in your environment? {YES/NO/NA:185287}    Social History     Tobacco Use     Smoking status: Never Smoker     Smokeless tobacco: Never Used   Substance Use Topics     Alcohol use: No     Alcohol Use 3/27/2019   If you drink alcohol do you typically have greater than 3 drinks per day OR greater than 7 drinks per week? No   {add AUDIT responses (Optional) (A score of 7 for adult men is an indication of hazardous drinking; a score of 8 or more is an " "indication of an alcohol use disorder.  A score of 7 or more for adult women is an indication of hazardous drinking or an alchohol use disorder):258725}    Last PSA:   PSA   Date Value Ref Range Status   01/24/2017 1.56 0 - 4 ug/L Final     Comment:     Assay Method:  Chemiluminescence using Siemens Vista analyzer       Reviewed orders with patient. Reviewed health maintenance and updated orders accordingly - {Yes/No:721984::\"Yes\"}  {Chronicprobdata (Optional):857518}    Reviewed and updated as needed this visit by clinical staff         Reviewed and updated as needed this visit by Provider        {HISTORY OPTIONS (Optional):534229}    Review of Systems   Constitutional: Negative for chills and fever.   HENT: Negative for congestion, ear pain, hearing loss and sore throat.    Eyes: Negative for pain and visual disturbance.   Respiratory: Negative for cough and shortness of breath.    Cardiovascular: Negative for chest pain, palpitations and peripheral edema.   Gastrointestinal: Negative for abdominal pain, constipation, diarrhea, heartburn, hematochezia and nausea.   Genitourinary: Negative for discharge, dysuria, frequency, genital sores, hematuria, impotence and urgency.   Musculoskeletal: Positive for arthralgias. Negative for joint swelling and myalgias.   Skin: Negative for rash.   Neurological: Negative for dizziness, headaches and paresthesias.   Psychiatric/Behavioral: Negative for mood changes. The patient is not nervous/anxious.      {MALE ROS (Optional):143165::\"CONSTITUTIONAL: NEGATIVE for fever, chills, change in weight\",\"INTEGUMENTARY/SKIN: NEGATIVE for worrisome rashes, moles or lesions\",\"EYES: NEGATIVE for vision changes or irritation\",\"ENT: NEGATIVE for ear, mouth and throat problems\",\"RESP: NEGATIVE for significant cough or SOB\",\"CV: NEGATIVE for chest pain, palpitations or peripheral edema\",\"GI: NEGATIVE for nausea, abdominal pain, heartburn, or change in bowel habits\",\" male: negative for " "dysuria, hematuria, decreased urinary stream, erectile dysfunction, urethral discharge\",\"MUSCULOSKELETAL: NEGATIVE for significant arthralgias or myalgia\",\"NEURO: NEGATIVE for weakness, dizziness or paresthesias\",\"PSYCHIATRIC: NEGATIVE for changes in mood or affect\"}    OBJECTIVE:   There were no vitals taken for this visit.    Physical Exam  {Exam Choices (Optional):327722}    {Diagnostic Test Results (Optional):574493::\"Diagnostic Test Results:\",\"none \"}    ASSESSMENT/PLAN:   {Diag Picklist:858313}    COUNSELING:   {MALE COUNSELING MESSAGES:150668::\"Reviewed preventive health counseling, as reflected in patient instructions\"}    BP Readings from Last 1 Encounters:   11/07/18 119/76     Estimated body mass index is 22.95 kg/m  as calculated from the following:    Height as of 3/7/18: 1.795 m (5' 10.67\").    Weight as of 11/7/18: 73.9 kg (163 lb).    {BP Counseling- Complete if BP >= 120/80  (Optional):075131}  {Weight Management Plan (ACO) Complete if BMI is abnormal-  Ages 18-64  BMI >24.9.  Age 65+ with BMI <23 or >30 (Optional):134147}     reports that  has never smoked. he has never used smokeless tobacco.  {Tobacco Cessation -- Complete if patient is a smoker (Optional):401926}    Counseling Resources:  ATP IV Guidelines  Pooled Cohorts Equation Calculator  FRAX Risk Assessment  ICSI Preventive Guidelines  Dietary Guidelines for Americans, 2010  USDA's MyPlate  ASA Prophylaxis  Lung CA Screening    Yonis Martin MD  [facility]  "

## 2019-03-27 NOTE — PATIENT INSTRUCTIONS
Preventive Health Recommendations:     See your health care provider every year to    Review health changes.     Discuss preventive care.      Review your medicines if your doctor has prescribed any.      Talk with your health care provider about whether you should have a test to screen for prostate cancer (PSA).    Every 3 years, have a diabetes test (fasting glucose). If you are at risk for diabetes, you should have this test more often.    Every 5 years, have a cholesterol test. Have this test more often if you are at risk for high cholesterol or heart disease.     Every 10 years, have a colonoscopy. Or, have a yearly FIT test (stool test). These exams will check for colon cancer.    Talk to with your health care provider about screening for Abdominal Aortic Aneurysm if you have a family history of AAA or have a history of smoking.    Shots:     Get a flu shot each year.     Get a tetanus shot every 10 years.     Talk to your doctor about your pneumonia vaccines. There are now two you should receive - Pneumovax (PPSV 23) and Prevnar (PCV 13).     Talk to your pharmacist about a shingles vaccine.     Talk to your doctor about the hepatitis B vaccine.  Nutrition:     Eat at least 5 servings of fruits and vegetables each day.     Eat whole-grain bread, whole-wheat pasta and brown rice instead of white grains and rice.     Get adequate Calcium and Vitamin D.   Lifestyle    Exercise for at least 150 minutes a week (30 minutes a day, 5 days a week). This will help you control your weight and prevent disease.     Limit alcohol to one drink per day.     No smoking.     Wear sunscreen to prevent skin cancer.    See your dentist every six months for an exam and cleaning.    See your eye doctor every 1 to 2 years to screen for conditions such as glaucoma, macular degeneration, cataracts, etc.    Personalized Prevention Plan  You are due for the preventive services outlined below.  Your care team is available to assist you  in scheduling these services.  If you have already completed any of these items, please share that information with your care team to update in your medical record.  Health Maintenance Due   Topic Date Due     AORTIC ANEURYSM SCREENING (SYSTEM ASSIGNED)  12/15/2015     CHACORTA QUESTIONNAIRE 1 YEAR  02/01/2018     Depression Assessment - yearly  02/01/2018     Zoster (Shingles) Vaccine (3 of 3) 01/02/2019     Annual Wellness Visit  03/07/2019     FALL RISK ASSESSMENT  03/07/2019

## 2019-03-27 NOTE — PROGRESS NOTES
"SUBJECTIVE:   Trent Estrada is a 68 year old male who presents for a Preventive Visit and follow-up on some baseline health conditions.  Are you in the first 12 months of your Medicare coverage?  No    Annual Wellness Visit     In general, how would you rate your overall health?  Good    Frequency of exercise:  2-3 days/week    Do you usually eat at least 4 servings of fruit and vegetables a day, include whole grains    & fiber and avoid regularly eating high fat or \"junk\" foods?  No    Taking medications regularly:  Not Applicable    Medication side effects:  Not applicable    Ability to successfully perform activities of daily living:  No assistance needed    Home Safety:  No safety concerns identified    Hearing Impairment:  Difficulty following a conversation in a noisy restaurant or crowded room    In the past 6 months, have you been bothered by leaking of urine?  No    In general, how would you rate your overall mental or emotional health?  Excellent    PHQ-2 Total Score: 0    Additional concerns today:  No    Do you feel safe in your environment? Yes    Do you have a Health Care Directive? No: Advance care planning was reviewed with patient; patient declined at this time.      Fall risk  Fallen 2 or more times in the past year?: No  Any fall with injury in the past year?: No    Cognitive Screening   1) Repeat 3 items (Leader, Season, Table)    2) Clock draw: NORMAL  3) 3 item recall: Recalls 3 objects  Results: 3 items recalled: COGNITIVE IMPAIRMENT LESS LIKELY    Mini-CogTM Copyright S Rehana. Licensed by the author for use in [facility]; reprinted with permission (julian@.[facility]). All rights reserved.      Do you have sleep apnea, excessive snoring or daytime drowsiness?: no    Reviewed and updated as needed this visit by clinical staff  Tobacco  Allergies  Meds  Med Hx  Surg Hx  Fam Hx  Soc Hx        Reviewed and updated as needed this visit by Provider        Social History     Tobacco " Use     Smoking status: Never Smoker     Smokeless tobacco: Never Used   Substance Use Topics     Alcohol use: No       Alcohol Use 3/27/2019   If you drink alcohol do you typically have greater than 3 drinks per day OR greater than 7 drinks per week? No     He sees dermatology 2-3 times a year for actinic keratoses and history of skin cancer.  He had been seeing physical therapy for right shoulder and left hip/groin discomfort.  He feels like his right shoulder is doing great.  He still has some left groin discomfort.  He was seen by our [place] chiropractor as well.  He did not feel like those treatments were very helpful.  The left hip discomfort is not very debilitating.  He rarely takes medication for it.  It seems to be better if he is up moving around.  If he is sitting for a prolonged period, then he feels discomfort into the left groin.  He is not having lateral hip pain.        Current providers sharing in care for this patient include:   Patient Care Team:  Yonis Martin MD as PCP - General  Yonis Martin MD as Assigned PCP    The following health maintenance items are reviewed in Epic and correct as of today:  Health Maintenance   Topic Date Due     AORTIC ANEURYSM SCREENING (SYSTEM ASSIGNED)  12/15/2015     CHACORTA QUESTIONNAIRE 1 YEAR  02/01/2018     PHQ-9 Q1YR  02/01/2018     ZOSTER IMMUNIZATION (3 of 3) 01/02/2019     MEDICARE ANNUAL WELLNESS VISIT  03/07/2019     FALL RISK ASSESSMENT  03/07/2019     DTAP/TDAP/TD IMMUNIZATION (3 - Td) 03/07/2021     LIPID SCREEN Q5 YR MALE (SYSTEM ASSIGNED)  01/24/2022     ADVANCE DIRECTIVE PLANNING Q5 YRS  03/07/2023     COLON CANCER SCREEN (SYSTEM ASSIGNED)  12/31/2028     INFLUENZA VACCINE  Completed     HEPATITIS C SCREENING  Completed     IPV IMMUNIZATION  Aged Out     MENINGITIS IMMUNIZATION  Aged Out     Patient Active Problem List   Diagnosis     Actinic keratoses     Advanced directives, counseling/discussion     History of skin cancer     Osteoarthritis  "of both hips     Past Surgical History:   Procedure Laterality Date     C UNLISTED VASCULAR ENDOSCOPY PROC      VAS REVERSAL     right ear skin CA removal and reconstruction  2012    had a skin graft from right side of neck, too     TONSILLECTOMY       VASECTOMY      VAS,REVERSAL,VAS       Social History     Tobacco Use     Smoking status: Never Smoker     Smokeless tobacco: Never Used   Substance Use Topics     Alcohol use: No     Family History   Problem Relation Age of Onset     Hypertension Mother          age 94 of CVA     Hypertension Father      Cancer Father      Prostate Cancer Father         in mid 70's,  age 84 of pneumonia     Alzheimer Disease Father      Cancer Son          Current Outpatient Medications   Medication Sig Dispense Refill     NO ACTIVE MEDICATIONS        No Known Allergies      Review of Systems   Constitutional: Negative for chills and fever.   HENT: Negative for congestion, ear pain, hearing loss and sore throat.    Eyes: Negative for pain and visual disturbance.   Respiratory: Negative for cough and shortness of breath.    Cardiovascular: Negative for chest pain, palpitations and peripheral edema.   Gastrointestinal: Negative for abdominal pain, constipation, diarrhea, heartburn, hematochezia and nausea.   Genitourinary: Negative for discharge, dysuria, frequency, genital sores, hematuria, impotence and urgency.   Musculoskeletal: Positive for arthralgias. Negative for joint swelling and myalgias.   Skin: Negative for rash.   Neurological: Negative for dizziness, headaches and paresthesias.   Psychiatric/Behavioral: Negative for mood changes. The patient is not nervous/anxious.          OBJECTIVE:   /72 (BP Location: Right arm, Patient Position: Chair, Cuff Size: Adult Regular)   Pulse 57   Temp 97.5  F (36.4  C) (Oral)   Ht 1.787 m (5' 10.35\")   Wt 73 kg (161 lb)   SpO2 97%   BMI 22.87 kg/m   Estimated body mass index is 22.87 kg/m  as calculated from the " "following:    Height as of this encounter: 1.787 m (5' 10.35\").    Weight as of this encounter: 73 kg (161 lb).  Physical Exam  GENERAL: healthy, alert and no distress  EYES: Eyes grossly normal to inspection, PERRL and conjunctivae and sclerae normal  HENT: ear canals and TM's normal, nose and mouth without ulcers or lesions  NECK: no adenopathy, no asymmetry, masses, or scars and thyroid normal to palpation  RESP: lungs clear to auscultation - no rales, rhonchi or wheezes  CV: regular rate and rhythm, normal S1 S2, no S3 or S4, no murmur, click or rub, no peripheral edema and peripheral pulses somewhat diminished  ABDOMEN: soft, nontender, no hepatosplenomegaly, no masses and bowel sounds normal  MS: He has slightly decreased range of motion with the left hip compared to the right and some mild discomfort in the left hip/groin with internal and external rotation.  No pain with hip flexion or extension.  No pain to palpation over the low back.  Straight leg raising is negative on the left.  No pain to palpation over the lateral left hip.  SKIN: He has some actinic changes on his face  NEURO: Normal strength and tone, mentation intact and speech normal  PSYCH: mentation appears normal, affect normal/bright    Diagnostic Test Results:  Hip x-ray results from July 2014 showed mild arthritis in both hips    ASSESSMENT / PLAN:       ICD-10-CM    1. Routine general medical examination at a health care facility Z00.00    2. Left groin pain R10.32    3. Primary osteoarthritis of both hips M16.0    4. Actinic keratoses L57.0    5. History of skin cancer Z85.828    6. Need for shingles vaccine Z23 VACCINE ADMINISTRATION, INITIAL     HC ZOSTER VACCINE RECOMBINANT ADJUVANTED IM NJX     Blood pressure and other vital signs look good  We talked a fair amount about his left hip/groin discomfort and I think it is due primarily to osteoarthritis  I offered him a hip x-ray and orthopedic referral, but after discussing these options " "in greater detail, the patient opted for continued conservative treatment with over-the-counter analgesics and home stretching and strengthening exercises  If his symptoms get worse, then we will proceed with plain film x-rays +/- orthopedic consultation  He was encouraged to continue his good health habits in the meantime  We will give him his second Shingrix vaccine today  Continue routine dermatology follow-up and care as per the recommendations  Plan a recheck in 1 year, or sooner prn    End of Life Planning:  Patient currently has an advanced directive: No.  I have verified the patient's ablity to prepare an advanced directive/make health care decisions.  Literature was provided to assist patient in preparing an advanced directive.    COUNSELING:  Reviewed preventive health counseling, as reflected in patient instructions       Regular exercise       Healthy diet/nutrition       Immunizations    Vaccinated for: Zoster          BP Readings from Last 1 Encounters:   03/27/19 117/72     Estimated body mass index is 22.87 kg/m  as calculated from the following:    Height as of this encounter: 1.787 m (5' 10.35\").    Weight as of this encounter: 73 kg (161 lb).           reports that  has never smoked. he has never used smokeless tobacco.      Appropriate preventive services were discussed with this patient, including applicable screening as appropriate for cardiovascular disease, diabetes, osteopenia/osteoporosis, and glaucoma.  As appropriate for age/gender, discussed screening for colorectal cancer, prostate cancer, breast cancer, and cervical cancer. Checklist reviewing preventive services available has been given to the patient.    Reviewed patients plan of care and provided an AVS. The Basic Care Plan (routine screening as documented in Health Maintenance) for Trent meets the Care Plan requirement. This Care Plan has been established and reviewed with the Patient.    Counseling Resources:  ATP IV " Guidelines  Pooled Cohorts Equation Calculator  Breast Cancer Risk Calculator  FRAX Risk Assessment  ICSI Preventive Guidelines  Dietary Guidelines for Americans, 2010  [employer]'s MyPlate  ASA Prophylaxis  Lung CA Screening    Yonis Martin MD  [facility]

## 2019-11-18 ENCOUNTER — OFFICE VISIT (OUTPATIENT)
Dept: FAMILY MEDICINE | Facility: CLINIC | Age: 69
End: 2019-11-18
Payer: COMMERCIAL

## 2019-11-18 VITALS
SYSTOLIC BLOOD PRESSURE: 117 MMHG | TEMPERATURE: 97.6 F | HEART RATE: 60 BPM | WEIGHT: 163 LBS | BODY MASS INDEX: 23.15 KG/M2 | DIASTOLIC BLOOD PRESSURE: 69 MMHG | OXYGEN SATURATION: 97 %

## 2019-11-18 DIAGNOSIS — M16.12 PRIMARY OSTEOARTHRITIS OF LEFT HIP: Primary | ICD-10-CM

## 2019-11-18 PROCEDURE — 99213 OFFICE O/P EST LOW 20 MIN: CPT | Performed by: FAMILY MEDICINE

## 2019-11-18 ASSESSMENT — PAIN SCALES - GENERAL: PAINLEVEL: MODERATE PAIN (5)

## 2019-11-18 NOTE — PATIENT INSTRUCTIONS
For your left hip arthritis pain, please try generic extra strength Tylenol (acetaminophen) 500 mg tablets.  You could take 1 to 2 tablets up to 3 times daily for pain relief.  This would be the safest and gentlest medication for you.    If that is not sufficient, you could try generic Aleve (naproxen) 220 mg tablets 1-2 up to 2 times daily with food periodically for extra pain relief.

## 2019-11-18 NOTE — PROGRESS NOTES
Subjective     Trent Estrada is a 68 year old male who presents to clinic today for the following health issues:    HPI   Joint Pain    Onset: Ongoing    Description:   Location: left hip  Character: Dull ache and Stabbing    Intensity: 5/10    Progression of Symptoms: same, the nights have gotten worse    Accompanying Signs & Symptoms:  Other symptoms: none    History:   Previous similar pain: YES      Precipitating factors:   Trauma or overuse: no     Alleviating factors:  Improved by: 2 Aleve    Therapies Tried and outcome: Aleve    He has known left hip osteoarthritis.  He had x-rays done in  showing mild arthritis.  His left hip has become more symptomatic over time.  He has discomfort now with certain activities.  He does get nice relief with taking 1 or 2 over-the-counter naproxen tablets.  He does not take any medication routinely for his left hip discomfort.  He was looking for input as to what medication would be best to take.  He was also interested in trying physical therapy again for his hip.  He has found that helpful in the past.    Patient Active Problem List   Diagnosis     Actinic keratoses     Advanced directives, counseling/discussion     History of skin cancer     Primary osteoarthritis of left hip     Past Surgical History:   Procedure Laterality Date     C UNLISTED VASCULAR ENDOSCOPY PROC      VAS REVERSAL     right ear skin CA removal and reconstruction  2012    had a skin graft from right side of neck, too     TONSILLECTOMY       VASECTOMY      VAS,REVERSAL,VAS       Social History     Tobacco Use     Smoking status: Never Smoker     Smokeless tobacco: Never Used   Substance Use Topics     Alcohol use: No     Family History   Problem Relation Age of Onset     Hypertension Mother          age 94 of CVA     Hypertension Father      Cancer Father      Prostate Cancer Father         in mid 70's,  age 84 of pneumonia     Alzheimer Disease Father      Cancer Son           Current Outpatient Medications   Medication Sig Dispense Refill     NO ACTIVE MEDICATIONS        No Known Allergies      Reviewed and updated as needed this visit by Provider         Review of Systems   ROS COMP: Constitutional, HEENT, cardiovascular, pulmonary, gi and gu systems are negative, except as otherwise noted.      Objective    Wt 73.9 kg (163 lb)   BMI 23.15 kg/m    Body mass index is 23.15 kg/m .  Physical Exam   GENERAL: healthy, alert and no distress  MS: He has no significant left hip discomfort with flexion or extension, but he does have left groin pain with hip external rotation and especially with internal rotation.    Diagnostic Test Results:  The x-ray results of his hips from 2014 were reviewed        Assessment & Plan       ICD-10-CM    1. Primary osteoarthritis of left hip M16.12 [facility] PT, HAND, AND CHIROPRACTIC REFERRAL     We discussed his left hip arthritis in some detail  I recommended over-the-counter extra strength acetaminophen 500 mg 1 to 2 tablets up to 3 times daily as needed for pain relief  If that is not sufficient, then use over-the-counter naproxen 1 to 2 tablets up to 2 times daily with food  We will also get him referred to physical therapy for his left hip  Discussed possible role for eventual cortisone injection and/or hip replacement if symptoms progress  We would probably get a hip x-ray prior to that    Return for a recheck if symptoms worsen or fail to improve.    Yonis Martin MD  [facility]

## 2019-11-25 ENCOUNTER — THERAPY VISIT (OUTPATIENT)
Dept: PHYSICAL THERAPY | Facility: CLINIC | Age: 69
End: 2019-11-25
Attending: FAMILY MEDICINE
Payer: COMMERCIAL

## 2019-11-25 DIAGNOSIS — M16.12 PRIMARY OSTEOARTHRITIS OF LEFT HIP: ICD-10-CM

## 2019-11-25 DIAGNOSIS — M25.552 HIP PAIN, LEFT: Primary | ICD-10-CM

## 2019-11-25 PROCEDURE — 97112 NEUROMUSCULAR REEDUCATION: CPT | Mod: GP | Performed by: PHYSICAL THERAPIST

## 2019-11-25 PROCEDURE — 97162 PT EVAL MOD COMPLEX 30 MIN: CPT | Mod: GP | Performed by: PHYSICAL THERAPIST

## 2019-11-25 PROCEDURE — 97110 THERAPEUTIC EXERCISES: CPT | Mod: GP | Performed by: PHYSICAL THERAPIST

## 2019-11-25 ASSESSMENT — ACTIVITIES OF DAILY LIVING (ADL)
GETTING_INTO_AND_OUT_OF_A_BATHTUB: NO DIFFICULTY AT ALL
WALKING_APPROXIMATELY_10_MINUTES: NO DIFFICULTY AT ALL
STEPPING_UP_AND_DOWN_CURBS: NO DIFFICULTY AT ALL
WALKING_15_MINUTES_OR_GREATER: NO DIFFICULTY AT ALL
STANDING_FOR_15_MINUTES: NO DIFFICULTY AT ALL
HOW_WOULD_YOU_RATE_YOUR_CURRENT_LEVEL_OF_FUNCTION_DURING_YOUR_USUAL_ACTIVITIES_OF_DAILY_LIVING_FROM_0_TO_100_WITH_100_BEING_YOUR_LEVEL_OF_FUNCTION_PRIOR_TO_YOUR_HIP_PROBLEM_AND_0_BEING_THE_INABILITY_TO_PERFORM_ANY_OF_YOUR_USUAL_DAILY_ACTIVITIES?: 90
HOS_ADL_ITEM_SCORE_TOTAL: 66
SITTING_FOR_15_MINUTES: NO DIFFICULTY AT ALL
GOING_DOWN_1_FLIGHT_OF_STAIRS: NO DIFFICULTY AT ALL
DEEP_SQUATTING: NO DIFFICULTY AT ALL
WALKING_UP_STEEP_HILLS: NO DIFFICULTY AT ALL
PUTTING_ON_SOCKS_AND_SHOES: NO DIFFICULTY AT ALL
HEAVY_WORK: NO DIFFICULTY AT ALL
LIGHT_TO_MODERATE_WORK: NO DIFFICULTY AT ALL
GOING_UP_1_FLIGHT_OF_STAIRS: NO DIFFICULTY AT ALL
HOS_ADL_HIGHEST_POTENTIAL_SCORE: 68
GETTING_INTO_AND_OUT_OF_AN_AVERAGE_CAR: NO DIFFICULTY AT ALL
HOS_ADL_SCORE(%): 97.06
WALKING_DOWN_STEEP_HILLS: NO DIFFICULTY AT ALL
ROLLING_OVER_IN_BED: SLIGHT DIFFICULTY
RECREATIONAL_ACTIVITIES: NO DIFFICULTY AT ALL
HOS_ADL_COUNT: 17
TWISTING/PIVOTING_ON_INVOLVED_LEG: SLIGHT DIFFICULTY
WALKING_INITIALLY: NO DIFFICULTY AT ALL

## 2019-11-25 NOTE — PROGRESS NOTES
[facility] Initial Evaluation -- Lower Extremity    Evaluation Date: November 25, 2019  Trent Estrada is a 68 year old male with a L Hip condition.   Referral: FP  Work mechanical stresses:  -sitting, computer,  Employment status: normal  Leisure mechanical stresses: 3 x/week - earobics (TM, bike, elipitcal), wts, core  Functional disability score: See flowsheet  VAS score (0-10): 3/10, ranges 1-7/10  Patient goals/expectations:  decr pain w/ daily activities    HISTORY:    Present symptoms: L groin, No radiation down leg, buttock or back  Pain quality (sharp/shooting/stabbing/aching/burning/cramping):  Aching, occas sharp    Present since (onset date): 4-5 yr Hx, has not gotten worse, would like to improve it. MD referral 11-  Symptoms (improving/unchaning/worsening):  unchanging.      Symptoms commenced as a result of: unknown   Condition occurred in the following environment: unknown     Symptoms at onset: Back and hip pain 4-5 yrs ago, but back pain seems to have resolved  Paresthesia (yes/no):  no  Spinal history: yes - see epic chart   Cough/Sneeze (pos/neg):  neg    Constant symptoms: L groin  Intermittent symptoms:     Symptoms are worse with the following: Sometimes Sitting prolonged, Always Rising pain 6/10, Sometimes First few steps, Always Walking 2-3/10, Always Stairs 2-3/10 and Other - sharp pain w/ changing positions in bed occas   Symptoms are better with the following: Other - 2 aleve a day    Continued use makes the pain (better/worse/no effect): NW    Disturbed night (yes/no): occas      Pain at rest (yes/no):  yes  Site (back/hip/knee/ankle/foot):  groin    Other questions (swelling/clicking/locking/giving way/falling):  none     Previous episodes: yes  Previous treatments: prev PT for Hip and LB - see epic chart    Specific Questions:  General health (excellent/good/fair/poor):  good  Pertinent medical history includes: None  Medications  "(nil/NSAIDS/analg/steroids/anticoag/other):  Other - Asprin 2x/day  Medical allergies:  none  Imaging (none/Xray/MRI/other):  None recently  Recent or major surgery (yes/no):  no  Night pain (yes/no):  no  Accidents (yes/no):  no  Unexplained weight loss (yes/no):  no  Barriers at home: none  Other red flags: none    Sites for physical examination (back/hip/knee/ankle/foot/other): back and hip    EXAMINATION    Posture:  Sitting (good/fair/poor): fair    Correction of Posture (better/worse/no effect/NA): better  Standing (good/fair/poor): good  Other observations:  Gait:  Slight decr ext hip L    Neurological: (NA/motor/sensory/reflexes/dural): NA    Baselines (pain or functional activity): Pain L hip/groin w/ sitting, rising from sitting, walking and stairs     Extremities (Hip / Knee / Ankle / Foot): AROM NA d/t time constraints, will assess at later visit.    Passive Movement (+/- over pressure)/(PDM/ERP):  NA  Resisted Test Response (pain):      R  L   Hip Flex 5/5  4/5   Quad  5/5  4+/5   HS  5/5  4+/5   Ant Tib  5/5  5-/5    Other Tests:     Spine:  Movement loss: Pre test - L groin pain standing   Lumbar AROM:    Flex  3\" from floor, Slight PDM L Hip    Ext Slight decr    SG R Symmetrical to L    SG L Mild L Hip  Effect of repeated movements:    Press Up w/ Exhale x10 - prod tightness/pain LB, Better, Incr ROM and Incr L leg strength  Effect of static positioning: NA  Spine testing (not relevant/relevant/secondary problem): relevant    Baseline Symptoms: NA - will clear L-spine then reassess  Repeated Tests Symptom Response Mechanical Response   Active/Passive movement, resisted test, functional test During -  Produce, Abolish, Increase, Decrease, NE After -  Better, Worse, NB, NW, NE Effect -   ? or ? ROM, strength or key functional test No   Effect          Effect of static positioning                Provisional Classification (Extremity/Spine):  Spine - Derangement - Asymmetrical, unilateral, symptoms " above knee      Princicple of Management:   Education:  Posture - Neutral Spine, use of L-roll, affect of posture on spine/pain, no couch, recliner, or propping up on pillows in bed, specificity of exer, centralisation/peripheralisation, and HEP    Equipment provided:  None - recommended using L-roll at work, in car, at home.  Exercise and dosage:  Press Up x10 6+ x/day.    ASSESSMENT/PLAN:    Patient is a 68 year old male with left side hip complaints.    Patient has the following significant findings with corresponding treatment plan.                Diagnosis 1: Left hip pain with apparent lumbar component/OA left hip Pain -  hot/cold therapy, manual therapy, self management, education, directional preference exercise and home program  Decreased ROM/flexibility - manual therapy, therapeutic exercise and home program  Decreased strength - therapeutic exercise, therapeutic activities and home program  Decreased function - therapeutic activities and home program  Impaired posture - neuro re-education and home program    Therapy Evaluation Codes:   1) History comprised of:   Personal factors that impact the plan of care:      Time since onset of symptoms.    Comorbidity factors that impact the plan of care are:      None.     Medications impacting care: Aspirin 2 times per day.  2) Examination of Body Systems comprised of:   Body structures and functions that impact the plan of care:      Hip and Lumbar spine.   Activity limitations that impact the plan of care are:      Sitting, Stairs, Walking and Rising from sitting and changing positions in bed.  3) Clinical presentation characteristics are:   Evolving/Changing.  4) Decision-Making    Moderate complexity using standardized patient assessment instrument and/or measureable assessment of functional outcome.  Cumulative Therapy Evaluation is: Moderate complexity.    Previous and current functional limitations:  (See Goal Flow Sheet for this information)    Short term  and Long term goals: (See Goal Flow Sheet for this information)     Communication ability:  Patient appears to be able to clearly communicate and understand verbal and written communication and follow directions correctly.  Treatment Explanation - The following has been discussed with the patient:   RX ordered/plan of care  Anticipated outcomes  Possible risks and side effects  This patient would benefit from PT intervention to resume normal activities.   Rehab potential is good.    Frequency: 1 X week, once daily  Duration:  for 8 weeks  Discharge Plan:  Achieve all LTG.  Independent in home treatment program.  Reach maximal therapeutic benefit.    Please refer to the daily flowsheet for treatment today, total treatment time and time spent performing 1:1 timed codes.

## 2019-12-04 ENCOUNTER — THERAPY VISIT (OUTPATIENT)
Dept: PHYSICAL THERAPY | Facility: CLINIC | Age: 69
End: 2019-12-04
Payer: COMMERCIAL

## 2019-12-04 DIAGNOSIS — M25.552 HIP PAIN, LEFT: Primary | ICD-10-CM

## 2019-12-04 PROCEDURE — 97530 THERAPEUTIC ACTIVITIES: CPT | Mod: GP | Performed by: PHYSICAL THERAPIST

## 2019-12-04 PROCEDURE — 97110 THERAPEUTIC EXERCISES: CPT | Mod: GP | Performed by: PHYSICAL THERAPIST

## 2019-12-09 ENCOUNTER — THERAPY VISIT (OUTPATIENT)
Dept: PHYSICAL THERAPY | Facility: CLINIC | Age: 69
End: 2019-12-09
Payer: COMMERCIAL

## 2019-12-09 DIAGNOSIS — M25.552 HIP PAIN, LEFT: Primary | ICD-10-CM

## 2019-12-09 PROCEDURE — 97110 THERAPEUTIC EXERCISES: CPT | Mod: GP | Performed by: PHYSICAL THERAPIST

## 2019-12-09 PROCEDURE — 97530 THERAPEUTIC ACTIVITIES: CPT | Mod: GP | Performed by: PHYSICAL THERAPIST

## 2019-12-16 ENCOUNTER — THERAPY VISIT (OUTPATIENT)
Dept: PHYSICAL THERAPY | Facility: CLINIC | Age: 69
End: 2019-12-16
Payer: COMMERCIAL

## 2019-12-16 DIAGNOSIS — M25.552 HIP PAIN, LEFT: Primary | ICD-10-CM

## 2019-12-16 PROCEDURE — 97110 THERAPEUTIC EXERCISES: CPT | Mod: GP | Performed by: PHYSICAL THERAPIST

## 2019-12-16 PROCEDURE — 97530 THERAPEUTIC ACTIVITIES: CPT | Mod: GP | Performed by: PHYSICAL THERAPIST

## 2019-12-23 ENCOUNTER — THERAPY VISIT (OUTPATIENT)
Dept: PHYSICAL THERAPY | Facility: CLINIC | Age: 69
End: 2019-12-23
Payer: COMMERCIAL

## 2019-12-23 DIAGNOSIS — M25.552 HIP PAIN, LEFT: Primary | ICD-10-CM

## 2019-12-23 PROCEDURE — 97530 THERAPEUTIC ACTIVITIES: CPT | Mod: GP | Performed by: PHYSICAL THERAPIST

## 2019-12-23 PROCEDURE — 97110 THERAPEUTIC EXERCISES: CPT | Mod: GP | Performed by: PHYSICAL THERAPIST

## 2020-01-20 ENCOUNTER — THERAPY VISIT (OUTPATIENT)
Dept: PHYSICAL THERAPY | Facility: CLINIC | Age: 70
End: 2020-01-20
Payer: COMMERCIAL

## 2020-01-20 DIAGNOSIS — M25.552 HIP PAIN, LEFT: Primary | ICD-10-CM

## 2020-01-20 PROCEDURE — 97110 THERAPEUTIC EXERCISES: CPT | Mod: GP | Performed by: PHYSICAL THERAPIST

## 2020-01-20 PROCEDURE — 97530 THERAPEUTIC ACTIVITIES: CPT | Mod: GP | Performed by: PHYSICAL THERAPIST

## 2020-03-17 ENCOUNTER — DOCUMENTATION ONLY (OUTPATIENT)
Dept: FAMILY MEDICINE | Facility: CLINIC | Age: 70
End: 2020-03-17

## 2020-03-17 NOTE — TELEPHONE ENCOUNTER
Message left for patient to return call to TC line to communicate message below. Patient is also scheduled for annual physical on 04/06/20.

## 2020-03-17 NOTE — PROGRESS NOTES
Recommend delaying this until sometime later this summer when the coronavirus pandemic settles down.

## 2020-03-18 NOTE — TELEPHONE ENCOUNTER
Message left for patient to return call to TC line to discuss message below. TC huddled with PCP and we should also reschedule annual physical to a later date as well due to the COVID-19 situation.

## 2020-03-19 NOTE — TELEPHONE ENCOUNTER
TC contacted patient and communicated message below. Both appointments have been cancelled at this time. Patient will call back at a later time to reschedule.

## 2020-07-08 ENCOUNTER — TELEPHONE (OUTPATIENT)
Dept: FAMILY MEDICINE | Facility: CLINIC | Age: 70
End: 2020-07-08

## 2020-07-08 DIAGNOSIS — Z00.00 ROUTINE GENERAL MEDICAL EXAMINATION AT A HEALTH CARE FACILITY: Primary | ICD-10-CM

## 2020-07-08 NOTE — TELEPHONE ENCOUNTER
Reason for Call: Request for an order or referral:    Order or referral being requested: lab orders     Date needed: as soon as possible    Has the patient been seen by the PCP for this problem? YES    Additional comments: for a physical in august      Phone number Patient can be reached at:  Home number on file [phone] (home)    Best Time:  Anytime     Can we leave a detailed message on this number?  YES    Call taken on 7/8/2020 at 10:14 AM by Megan Foote

## 2020-07-08 NOTE — TELEPHONE ENCOUNTER
Routed to PCP to please advise.  Magda SheltonRN,BSN  [facility]

## 2020-08-19 DIAGNOSIS — Z00.00 ROUTINE GENERAL MEDICAL EXAMINATION AT A HEALTH CARE FACILITY: ICD-10-CM

## 2020-08-19 LAB
ANION GAP SERPL CALCULATED.3IONS-SCNC: 5 MMOL/L (ref 3–14)
BUN SERPL-MCNC: 17 MG/DL (ref 7–30)
CALCIUM SERPL-MCNC: 8.8 MG/DL (ref 8.5–10.1)
CHLORIDE SERPL-SCNC: 108 MMOL/L (ref 94–109)
CHOLEST SERPL-MCNC: 148 MG/DL
CO2 SERPL-SCNC: 29 MMOL/L (ref 20–32)
CREAT SERPL-MCNC: 0.92 MG/DL (ref 0.66–1.25)
ERYTHROCYTE [DISTWIDTH] IN BLOOD BY AUTOMATED COUNT: 13.4 % (ref 10–15)
GFR SERPL CREATININE-BSD FRML MDRD: 84 ML/MIN/{1.73_M2}
GLUCOSE SERPL-MCNC: 92 MG/DL (ref 70–99)
HCT VFR BLD AUTO: 44.6 % (ref 40–53)
HDLC SERPL-MCNC: 51 MG/DL
HGB BLD-MCNC: 15.6 G/DL (ref 13.3–17.7)
LDLC SERPL CALC-MCNC: 82 MG/DL
MCH RBC QN AUTO: 32 PG (ref 26.5–33)
MCHC RBC AUTO-ENTMCNC: 35 G/DL (ref 31.5–36.5)
MCV RBC AUTO: 91 FL (ref 78–100)
NONHDLC SERPL-MCNC: 97 MG/DL
PLATELET # BLD AUTO: 185 10E9/L (ref 150–450)
POTASSIUM SERPL-SCNC: 4 MMOL/L (ref 3.4–5.3)
PSA SERPL-ACNC: 2.22 UG/L (ref 0–4)
RBC # BLD AUTO: 4.88 10E12/L (ref 4.4–5.9)
SODIUM SERPL-SCNC: 142 MMOL/L (ref 133–144)
TRIGL SERPL-MCNC: 75 MG/DL
WBC # BLD AUTO: 7.2 10E9/L (ref 4–11)

## 2020-08-19 PROCEDURE — G0103 PSA SCREENING: HCPCS | Performed by: FAMILY MEDICINE

## 2020-08-19 PROCEDURE — 80048 BASIC METABOLIC PNL TOTAL CA: CPT | Performed by: FAMILY MEDICINE

## 2020-08-19 PROCEDURE — 80061 LIPID PANEL: CPT | Performed by: FAMILY MEDICINE

## 2020-08-19 PROCEDURE — 85027 COMPLETE CBC AUTOMATED: CPT | Performed by: FAMILY MEDICINE

## 2020-08-19 PROCEDURE — 36415 COLL VENOUS BLD VENIPUNCTURE: CPT | Performed by: FAMILY MEDICINE

## 2020-08-26 ENCOUNTER — OFFICE VISIT (OUTPATIENT)
Dept: FAMILY MEDICINE | Facility: CLINIC | Age: 70
End: 2020-08-26
Payer: COMMERCIAL

## 2020-08-26 VITALS
WEIGHT: 158 LBS | OXYGEN SATURATION: 98 % | SYSTOLIC BLOOD PRESSURE: 115 MMHG | TEMPERATURE: 97.8 F | HEART RATE: 51 BPM | HEIGHT: 70 IN | DIASTOLIC BLOOD PRESSURE: 77 MMHG | BODY MASS INDEX: 22.62 KG/M2

## 2020-08-26 DIAGNOSIS — L57.0 ACTINIC KERATOSES: ICD-10-CM

## 2020-08-26 DIAGNOSIS — Z00.00 ENCOUNTER FOR MEDICARE ANNUAL WELLNESS EXAM: Primary | ICD-10-CM

## 2020-08-26 DIAGNOSIS — Z85.828 HISTORY OF SKIN CANCER: ICD-10-CM

## 2020-08-26 DIAGNOSIS — Z23 NEED FOR PROPHYLACTIC VACCINATION WITH TETANUS-DIPHTHERIA (TD): ICD-10-CM

## 2020-08-26 DIAGNOSIS — M16.12 PRIMARY OSTEOARTHRITIS OF LEFT HIP: ICD-10-CM

## 2020-08-26 PROCEDURE — 99397 PER PM REEVAL EST PAT 65+ YR: CPT | Mod: 25 | Performed by: FAMILY MEDICINE

## 2020-08-26 PROCEDURE — 90471 IMMUNIZATION ADMIN: CPT | Performed by: FAMILY MEDICINE

## 2020-08-26 PROCEDURE — 90714 TD VACC NO PRESV 7 YRS+ IM: CPT | Performed by: FAMILY MEDICINE

## 2020-08-26 ASSESSMENT — ACTIVITIES OF DAILY LIVING (ADL): CURRENT_FUNCTION: NO ASSISTANCE NEEDED

## 2020-08-26 ASSESSMENT — MIFFLIN-ST. JEOR: SCORE: 1495.42

## 2020-08-26 NOTE — NURSING NOTE
Prior to immunization administration, verified patients identity using patient s name and date of birth. Please see Immunization Activity for additional information.     Screening Questionnaire for Adult Immunization    Are you sick today?   No   Do you have allergies to medications, food, a vaccine component or latex?   No   Have you ever had a serious reaction after receiving a vaccination?   No   Do you have a long-term health problem with heart, lung, kidney, or metabolic disease (e.g., diabetes), asthma, a blood disorder, no spleen, complement component deficiency, a cochlear implant, or a spinal fluid leak?  Are you on long-term aspirin therapy?   No   Do you have cancer, leukemia, HIV/AIDS, or any other immune system problem?   No   Do you have a parent, brother, or sister with an immune system problem?   No   In the past 3 months, have you taken medications that affect  your immune system, such as prednisone, other steroids, or anticancer drugs; drugs for the treatment of rheumatoid arthritis, Crohn s disease, or psoriasis; or have you had radiation treatments?   No   Have you had a seizure, or a brain or other nervous system problem?   No   During the past year, have you received a transfusion of blood or blood    products, or been given immune (gamma) globulin or antiviral drug?   No   For women: Are you pregnant or is there a chance you could become       pregnant during the next month?   No   Have you received any vaccinations in the past 4 weeks?   No     Immunization questionnaire answers were all negative.        Per orders of Dr. Martin, injection of Td given by Edith Dickens CMA. Patient instructed to remain in clinic for 15 minutes afterwards, and to report any adverse reaction to me immediately.  Vaccine information supplied.       Screening performed by Edith Dickens CMA on 8/26/2020 at 11:08 AM.

## 2020-08-26 NOTE — PATIENT INSTRUCTIONS
Patient Education   Personalized Prevention Plan  You are due for the preventive services outlined below.  Your care team is available to assist you in scheduling these services.  If you have already completed any of these items, please share that information with your care team to update in your medical record.  Health Maintenance Due   Topic Date Due     AORTIC ANEURYSM SCREENING (SYSTEM ASSIGNED)  12/15/2015     PHQ-2  01/01/2020     Annual Wellness Visit  03/27/2020     FALL RISK ASSESSMENT  03/27/2020     Flu Vaccine (1) 09/01/2020

## 2020-08-26 NOTE — PROGRESS NOTES
"SUBJECTIVE:   Trent Estrada is a 69 year old male who presents for a Preventive Visit.    Are you in the first 12 months of your Medicare coverage?  No    Healthy Habits:     In general, how would you rate your overall health?  Very good    Frequency of exercise:  4-5 days/week    Duration of exercise:  15-30 minutes    Do you usually eat at least 4 servings of fruit and vegetables a day, include whole grains    & fiber and avoid regularly eating high fat or \"junk\" foods?  No    Taking medications regularly:  Not Applicable    Ability to successfully perform activities of daily living:  No assistance needed    Home Safety:  No safety concerns identified    Hearing Impairment:  No hearing concerns    In the past 6 months, have you been bothered by leaking of urine?  No    In general, how would you rate your overall mental or emotional health?  Very good      PHQ-2 Total Score: 0    Additional concerns today:  No    Do you feel safe in your environment? Yes    Have you ever done Advance Care Planning? (For example, a Health Directive, POLST, or a discussion with a medical provider or your loved ones about your wishes): No, advance care planning information given to patient to review.  Advanced care planning was discussed at today's visit.      Fall risk  Fallen 2 or more times in the past year?: No  Any fall with injury in the past year?: No    Cognitive Screening   1) Repeat 3 items (Leader, Season, Table)    2) Clock draw: NORMAL  3) 3 item recall: Recalls 3 objects  Results: 3 items recalled: COGNITIVE IMPAIRMENT LESS LIKELY    Mini-CogTM Copyright FIORDALIZA Kimball. Licensed by the author for use in [facility]; reprinted with permission (julian@.[email or web address]). All rights reserved.      Do you have sleep apnea, excessive snoring or daytime drowsiness?: no    Reviewed and updated as needed this visit by clinical staff  Tobacco  Allergies  Meds  Med Hx  Surg Hx  Fam Hx  Soc Hx        Reviewed and updated as " needed this visit by Provider        Social History     Tobacco Use     Smoking status: Never Smoker     Smokeless tobacco: Never Used   Substance Use Topics     Alcohol use: No     If you drink alcohol do you typically have >3 drinks per day or >7 drinks per week? No    Alcohol Use 3/27/2019   Prescreen: >3 drinks/day or >7 drinks/week? No   Prescreen: >3 drinks/day or >7 drinks/week? -       He normally goes swimming 5 days a week for exercise.  He feels healthy.  He continues to see dermatology every 6 months for his history of skin cancer and actinic keratoses.  He has had some left hip arthritis issues in the past, but that is manageable for him.  No other specific concerns or complaints today.  He is on no prescription medications.        Current providers sharing in care for this patient include:   Patient Care Team:  Yonis Martin MD as PCP - General  Yonis Martin MD as Assigned PCP    The following health maintenance items are reviewed in Epic and correct as of today:  Health Maintenance   Topic Date Due     AORTIC ANEURYSM SCREENING (SYSTEM ASSIGNED)  12/15/2015     PHQ-2  01/01/2020     MEDICARE ANNUAL WELLNESS VISIT  03/27/2020     FALL RISK ASSESSMENT  03/27/2020     INFLUENZA VACCINE (1) 09/01/2020     DTAP/TDAP/TD IMMUNIZATION (3 - Td) 03/07/2021     ADVANCE CARE PLANNING  03/07/2023     LIPID  08/19/2025     COLORECTAL CANCER SCREENING  12/31/2028     HEPATITIS C SCREENING  Completed     PNEUMOCOCCAL IMMUNIZATION 65+ LOW/MEDIUM RISK  Completed     ZOSTER IMMUNIZATION  Completed     IPV IMMUNIZATION  Aged Out     MENINGITIS IMMUNIZATION  Aged Out     HEPATITIS B IMMUNIZATION  Aged Out     Patient Active Problem List   Diagnosis     Actinic keratoses     Advanced directives, counseling/discussion     History of skin cancer     Primary osteoarthritis of left hip     Hip pain, left     Past Surgical History:   Procedure Laterality Date     C UNLISTED VASCULAR ENDOSCOPY PROC      VAS REVERSAL      "right ear skin CA removal and reconstruction  2012    had a skin graft from right side of neck, too     TONSILLECTOMY       VASECTOMY      VAS,REVERSAL,VAS       Social History     Tobacco Use     Smoking status: Never Smoker     Smokeless tobacco: Never Used   Substance Use Topics     Alcohol use: No     Family History   Problem Relation Age of Onset     Hypertension Mother          age 94 of CVA     Hypertension Father      Cancer Father      Prostate Cancer Father         in mid 70's,  age 84 of pneumonia     Alzheimer Disease Father      Cancer Son          Current Outpatient Medications   Medication Sig Dispense Refill     NO ACTIVE MEDICATIONS        No Known Allergies      Review of Systems  Constitutional, HEENT, cardiovascular, pulmonary, GI, , musculoskeletal, neuro, skin, endocrine and psych systems are negative, except as otherwise noted.    OBJECTIVE:   /77 (BP Location: Right arm, Patient Position: Sitting, Cuff Size: Adult Regular)   Pulse 51   Temp 97.8  F (36.6  C) (Oral)   Ht 1.79 m (' 10.47\")   Wt 71.7 kg (158 lb)   SpO2 98%   BMI 22.37 kg/m   Estimated body mass index is 22.37 kg/m  as calculated from the following:    Height as of this encounter: 1.79 m (5' 10.47\").    Weight as of this encounter: 71.7 kg (158 lb).  Physical Exam  GENERAL: healthy, alert and no distress  EYES: Eyes grossly normal to inspection, PERRL and conjunctivae and sclerae normal  HENT: ear canals and TM's normal, nose and mouth without ulcers or lesions  NECK: no adenopathy, no asymmetry, masses, or scars and thyroid normal to palpation  RESP: lungs clear to auscultation - no rales, rhonchi or wheezes  CV: regular rate and rhythm, normal S1 S2, no S3 or S4, no murmur, click or rub, no peripheral edema and peripheral pulses strong  ABDOMEN: soft, nontender, no hepatosplenomegaly, no masses and bowel sounds normal  MS: no gross musculoskeletal defects noted, no edema  SKIN: A few areas of actinic " keratoses on the face, no obvious skin cancers currently  NEURO: Normal strength and tone, mentation intact and speech normal  PSYCH: mentation appears normal, affect normal/bright    Diagnostic Test Results:  Labs reviewed in Epic  Orders Only on 08/19/2020   Component Date Value Ref Range Status     PSA 08/19/2020 2.22  0 - 4 ug/L Final    Assay Method:  Chemiluminescence using Siemens Vista analyzer     Cholesterol 08/19/2020 148  <200 mg/dL Final     Triglycerides 08/19/2020 75  <150 mg/dL Final    Fasting specimen     HDL Cholesterol 08/19/2020 51  >39 mg/dL Final     LDL Cholesterol Calculated 08/19/2020 82  <100 mg/dL Final    Desirable:       <100 mg/dl     Non HDL Cholesterol 08/19/2020 97  <130 mg/dL Final     Sodium 08/19/2020 142  133 - 144 mmol/L Final     Potassium 08/19/2020 4.0  3.4 - 5.3 mmol/L Final     Chloride 08/19/2020 108  94 - 109 mmol/L Final     Carbon Dioxide 08/19/2020 29  20 - 32 mmol/L Final     Anion Gap 08/19/2020 5  3 - 14 mmol/L Final     Glucose 08/19/2020 92  70 - 99 mg/dL Final    Fasting specimen     Urea Nitrogen 08/19/2020 17  7 - 30 mg/dL Final     Creatinine 08/19/2020 0.92  0.66 - 1.25 mg/dL Final     GFR Estimate 08/19/2020 84  >60 mL/min/[1.73_m2] Final    Comment: Non  GFR Calc  Starting 12/18/2018, serum creatinine based estimated GFR (eGFR) will be   calculated using the Chronic Kidney Disease Epidemiology Collaboration   (CKD-EPI) equation.       GFR Estimate If Black 08/19/2020 >90  >60 mL/min/[1.73_m2] Final    Comment:  GFR Calc  Starting 12/18/2018, serum creatinine based estimated GFR (eGFR) will be   calculated using the Chronic Kidney Disease Epidemiology Collaboration   (CKD-EPI) equation.       Calcium 08/19/2020 8.8  8.5 - 10.1 mg/dL Final     WBC 08/19/2020 7.2  4.0 - 11.0 10e9/L Final     RBC Count 08/19/2020 4.88  4.4 - 5.9 10e12/L Final     Hemoglobin 08/19/2020 15.6  13.3 - 17.7 g/dL Final     Hematocrit 08/19/2020 44.6   "40.0 - 53.0 % Final     MCV 08/19/2020 91  78 - 100 fl Final     MCH 08/19/2020 32.0  26.5 - 33.0 pg Final     MCHC 08/19/2020 35.0  31.5 - 36.5 g/dL Final     RDW 08/19/2020 13.4  10.0 - 15.0 % Final     Platelet Count 08/19/2020 185  150 - 450 10e9/L Final         ASSESSMENT / PLAN:       ICD-10-CM    1. Encounter for Medicare annual wellness exam  Z00.00    2. Actinic keratoses  L57.0    3. History of skin cancer  Z85.828    4. Primary osteoarthritis of left hip  M16.12    5. Need for prophylactic vaccination with tetanus-diphtheria (Td)  Z23 TD PRESERV FREE, IM (7+ YRS)     ADMIN 1st VACCINE     Lab results are all nice and normal  Vital signs continue to look good  Continue good diet and exercise habits  He will be due for a tetanus booster this coming March, so we elected to go ahead and give him that today  He was advised to get a flu shot in a month or 2  Plan a recheck in 1 year, or sooner prn    COUNSELING:  Reviewed preventive health counseling, as reflected in patient instructions       Regular exercise       Healthy diet/nutrition       Immunizations    Vaccinated for: Td          Estimated body mass index is 23.15 kg/m  as calculated from the following:    Height as of 3/27/19: 1.787 m (5' 10.35\").    Weight as of 11/18/19: 73.9 kg (163 lb).        He reports that he has never smoked. He has never used smokeless tobacco.      Appropriate preventive services were discussed with this patient, including applicable screening as appropriate for cardiovascular disease, diabetes, osteopenia/osteoporosis, and glaucoma.  As appropriate for age/gender, discussed screening for colorectal cancer, prostate cancer, breast cancer, and cervical cancer. Checklist reviewing preventive services available has been given to the patient.    Reviewed patients plan of care and provided an AVS. The Basic Care Plan (routine screening as documented in Health Maintenance) for Trent meets the Care Plan requirement. This Care " Plan has been established and reviewed with the Patient.    Counseling Resources:  ATP IV Guidelines  Pooled Cohorts Equation Calculator  Breast Cancer Risk Calculator  Breast Cancer: Medication to Reduce Risk  FRAX Risk Assessment  ICSI Preventive Guidelines  Dietary Guidelines for Americans, 2010  USDA's MyPlate  ASA Prophylaxis  Lung CA Screening    Yonis Martin MD  [facility]    Identified Health Risks:

## 2020-11-02 ENCOUNTER — OFFICE VISIT (OUTPATIENT)
Dept: FAMILY MEDICINE | Facility: CLINIC | Age: 70
End: 2020-11-02
Payer: COMMERCIAL

## 2020-11-02 ENCOUNTER — ANCILLARY PROCEDURE (OUTPATIENT)
Dept: GENERAL RADIOLOGY | Facility: CLINIC | Age: 70
End: 2020-11-02
Attending: FAMILY MEDICINE
Payer: COMMERCIAL

## 2020-11-02 VITALS
OXYGEN SATURATION: 97 % | DIASTOLIC BLOOD PRESSURE: 66 MMHG | BODY MASS INDEX: 22.37 KG/M2 | TEMPERATURE: 98.2 F | HEART RATE: 59 BPM | SYSTOLIC BLOOD PRESSURE: 105 MMHG | WEIGHT: 158 LBS

## 2020-11-02 DIAGNOSIS — M25.552 HIP PAIN, LEFT: Primary | ICD-10-CM

## 2020-11-02 DIAGNOSIS — M16.12 PRIMARY OSTEOARTHRITIS OF LEFT HIP: ICD-10-CM

## 2020-11-02 PROCEDURE — 73502 X-RAY EXAM HIP UNI 2-3 VIEWS: CPT | Performed by: FAMILY MEDICINE

## 2020-11-02 PROCEDURE — 99213 OFFICE O/P EST LOW 20 MIN: CPT | Performed by: FAMILY MEDICINE

## 2020-11-02 ASSESSMENT — PAIN SCALES - GENERAL: PAINLEVEL: MODERATE PAIN (4)

## 2020-11-02 NOTE — PROGRESS NOTES
Subjective     Trent Estrada is a 69 year old male who presents to clinic today for the following health issues:    HPI         Recheck left hip issues and discuss the next steps.    He has known left hip osteoarthritis.  X-rays of the hip were done 6 years ago and showed mild osteoarthritic changes.  Since then, he has done some chiropractic treatment, acupuncture treatment, and physical therapy.  His hip is doing better than what it used to be.  His maximal pain now is about 4 out of 10.  It does not limit his physical activity.  He has a job now where he is on his feet 5 to 6 hours a day and he does find himself limping at times and getting some soreness of the hip.  He wonders what steps there might be now going forward to treat this.  He does mention that he would like to avoid any hip surgery for at least a year from now.  He has a ski trip planned for this coming February.    Patient Active Problem List   Diagnosis     Actinic keratoses     Advanced directives, counseling/discussion     History of skin cancer     Primary osteoarthritis of left hip     Hip pain, left     Current Outpatient Medications   Medication     NO ACTIVE MEDICATIONS     No current facility-administered medications for this visit.           Review of Systems   Constitutional, HEENT, cardiovascular, pulmonary, gi and gu systems are negative, except as otherwise noted.      Objective    /66 (BP Location: Right arm, Patient Position: Sitting, Cuff Size: Adult Regular)   Pulse 59   Temp 98.2  F (36.8  C) (Oral)   Wt 71.7 kg (158 lb)   SpO2 97%   BMI 22.37 kg/m    Body mass index is 22.37 kg/m .  Physical Exam   GENERAL: healthy, alert and no distress  MS: He has limited range of motion with the hip, especially with internal rotation.  He has mild discomfort with hip flexion, but especially with internal rotation.  Less so with external rotation.  No specific tenderness over the lateral hip.    Xray -- his x-ray report from July  2014 was reviewed        Assessment & Plan       ICD-10-CM    1. Hip pain, left  M25.552 XR Hip Left 2-3 Views     [facility] PT[facility] HAND, AND CHIROPRACTIC REFERRAL   2. Primary osteoarthritis of left hip  M16.12      He has had some ongoing left hip discomfort from underlying osteoarthritis, but it is not overly debilitating at this point  I suggested conservative care at this time to include a referral back to physical therapy for some further stretching and strengthening exercises  I suggested an over-the-counter NSAID such as naproxen 220 mg 1 to 2 tablets twice a day prn (with food)  Discussed possible role for cortisone injection if symptoms worsen and/or before a more vigorous planned physical activity such as downhill skiing    We will also check repeat x-rays today to see how his hip arthritis may have progressed over the last 6 years and we did discuss possible need/desire for total hip arthroplasty at some point in the future      Return for a follow up as needed/desired.    Yonis Martin MD  [facility]

## 2020-11-03 NOTE — RESULT ENCOUNTER NOTE
"Agustin,  As we suspected, your left hip arthritis has progressed significantly since 2014.  The radiologist now characterizes your hip x-ray as showing \"severe\" arthritis, and I would agree based on my interpretation of the films.  This would account for the left hip and groin discomfort that you have been experiencing in recent years.  I would recommend proceeding with physical therapy, as we discussed, with an eye towards possible cortisone injection and/or left hip replacement in the future as needed.    Yonis Martin MD  "

## 2020-11-13 ENCOUNTER — THERAPY VISIT (OUTPATIENT)
Dept: PHYSICAL THERAPY | Facility: CLINIC | Age: 70
End: 2020-11-13
Attending: FAMILY MEDICINE
Payer: COMMERCIAL

## 2020-11-13 DIAGNOSIS — M25.552 HIP PAIN, LEFT: ICD-10-CM

## 2020-11-13 PROCEDURE — 97110 THERAPEUTIC EXERCISES: CPT | Mod: GP | Performed by: PHYSICAL THERAPIST

## 2020-11-13 PROCEDURE — 97161 PT EVAL LOW COMPLEX 20 MIN: CPT | Mod: GP | Performed by: PHYSICAL THERAPIST

## 2020-11-13 ASSESSMENT — ACTIVITIES OF DAILY LIVING (ADL)
WALKING_UP_STEEP_HILLS: NO DIFFICULTY AT ALL
STANDING_FOR_15_MINUTES: NO DIFFICULTY AT ALL
HEAVY_WORK: SLIGHT DIFFICULTY
WALKING_DOWN_STEEP_HILLS: NO DIFFICULTY AT ALL
HOS_ADL_SCORE(%): 92.65
HOS_ADL_ITEM_SCORE_TOTAL: 63
GOING_UP_1_FLIGHT_OF_STAIRS: NO DIFFICULTY AT ALL
DEEP_SQUATTING: SLIGHT DIFFICULTY
PUTTING_ON_SOCKS_AND_SHOES: NO DIFFICULTY AT ALL
WALKING_INITIALLY: SLIGHT DIFFICULTY
STEPPING_UP_AND_DOWN_CURBS: NO DIFFICULTY AT ALL
GETTING_INTO_AND_OUT_OF_AN_AVERAGE_CAR: NO DIFFICULTY AT ALL
LIGHT_TO_MODERATE_WORK: NO DIFFICULTY AT ALL
HOS_ADL_HIGHEST_POTENTIAL_SCORE: 68
GETTING_INTO_AND_OUT_OF_A_BATHTUB: NO DIFFICULTY AT ALL
TWISTING/PIVOTING_ON_INVOLVED_LEG: SLIGHT DIFFICULTY
WALKING_APPROXIMATELY_10_MINUTES: NO DIFFICULTY AT ALL
GOING_DOWN_1_FLIGHT_OF_STAIRS: NO DIFFICULTY AT ALL
ROLLING_OVER_IN_BED: SLIGHT DIFFICULTY
RECREATIONAL_ACTIVITIES: NO DIFFICULTY AT ALL
HOS_ADL_COUNT: 17
HOW_WOULD_YOU_RATE_YOUR_CURRENT_LEVEL_OF_FUNCTION_DURING_YOUR_USUAL_ACTIVITIES_OF_DAILY_LIVING_FROM_0_TO_100_WITH_100_BEING_YOUR_LEVEL_OF_FUNCTION_PRIOR_TO_YOUR_HIP_PROBLEM_AND_0_BEING_THE_INABILITY_TO_PERFORM_ANY_OF_YOUR_USUAL_DAILY_ACTIVITIES?: 75
SITTING_FOR_15_MINUTES: NO DIFFICULTY AT ALL
WALKING_15_MINUTES_OR_GREATER: NO DIFFICULTY AT ALL

## 2020-11-13 NOTE — PROGRESS NOTES
[facility] Initial Evaluation  Subjective:  The history is provided by the patient. No  was used.   Patient Health History  Trent Estrada being seen for Left Hip Pain.     Problem began: 11/2/2020 (referral date).   Problem occurred: Left Hip Arthritis   Pain is reported as 3/10 on pain scale.  General health as reported by patient is good.  Pertinent medical history includes: none.   Red flags:  None as reported by patient.  Medical allergies: none.       Current medications:  Anti-inflammatory.    Current occupation is Retired- Working part time at [employer] on the [employer] floor a few hours a week.   Primary job tasks include:  Prolonged standing, computer work, repetitive tasks and lifting/carrying.                  Therapist Generated HPI Evaluation         Type of problem:  Left hip.    This is a recurrent condition.  Condition occurred with:  Other reason.  Where condition occurred: other.    Pain is described as aching and is intermittent.  Pain radiates to:  No radiation.   Since onset symptoms are gradually worsening.  Exacerbated by: walking increases limping.  and relieved by rest.  Special tests included:  X-ray (severe L hip Arthritis).    Restrictions due to condition include:  Working in normal job without restrictions.  Barriers include:  None as reported by patient.    Goals: sleeping has been the most difficult, can still sleep but it is not as restful. Pt reports also worse with sitting to standing.                      Objective:  System                                           Hip Evaluation  HIP AROM:    Flexion: Left: decreased 25% with pain    Right:  WNL, no pain    Extension: Left: decreased 25%    Right:  WNL      Internal Rotation: Left: decreased 25%    Right: WNL  External Rotation: Left: decreased 25%    Right: WNL        Hip Strength:    Flexion:   Left: 3+/5    Pain: strong/painful  Right: 4+/5    Pain: strong/pain free                     Extension:  Left: 4+/5   Pain:strong/pain freeRight: 4+/5     Pain: strong/pain free        Internal Rotation:  Left: 4+/5     Pain:strong/pain freeRight: 4+/5    Pain:strong/pain free  External Rotation:  Left: 4+/5    Pain: strong/pain free  Right: 4+/5    Pain: strong/pain free            Hip Special Testing:    Left hip positive for the following special tests:  Teresa  Right hip negative for the following special tests:  Teresa Lawler Lumbar Evaluation    Posture:  Sitting: good  Standing: good  Lordosis: Reduced  Lateral Shift: no      Movement Loss:  Flexion (Flex): min  Extension (EXT): min  Side Glide R (SG R): min  Side Glide L (SG L): nil  Test Movements:  FIS: During: no effect  Pretest Movements: 3/10 L anterior hip pain  Repeat FIS: During: no effect  After: no effect  Mechanical Response: no effect  EIS: During: no effect    Repeat EIS: During: no effect  After: no effect  Mechanical Response: IncROM            Conclusion: other  Principle of Treatment:      Extension: repeated extension in standing x10 repetitions increases ROM, perform per HEP with table every 2-3 hours during the day.                                            ROS    Assessment/Plan:    Patient is a 69 year old male with left side hip complaints.    Patient has the following significant findings with corresponding treatment plan.                Diagnosis 1:  Left Hip Pain  Pain -  manual therapy, self management, education and home program  Decreased ROM/flexibility - manual therapy and therapeutic exercise  Decreased strength - therapeutic exercise and therapeutic activities    Therapy Evaluation Codes:   1) History comprised of:   Personal factors that impact the plan of care:      Time since onset of symptoms.   Comorbidity factors that impact the plan of care are:      none.     Medications impacting care: Anti-inflammatory.  2) Examination of Body Systems comprised of:   Body structures and functions that  impact the plan of care:      lumbar spine, L hip.   Activity limitations that impact the plan of care are:      sleeping, sitting to standing, ambulation.  3) Clinical presentation characteristics are:   Stable/Uncomplicated.  4) Decision-Making    Low complexity using standardized patient assessment instrument and/or measureable assessment of functional outcome.  Cumulative Therapy Evaluation is: Low complexity.    Previous and current functional limitations:  (See Goal Flow Sheet for this information)    Short term and Long term goals: (See Goal Flow Sheet for this information)     Communication ability:  Patient appears to be able to clearly communicate and understand verbal and written communication and follow directions correctly.  Treatment Explanation - The following has been discussed with the patient:   RX ordered/plan of care  Anticipated outcomes  Possible risks and side effects  This patient would benefit from PT intervention to resume normal activities.   Rehab potential is excellent.    Frequency:  1 X week, once daily  Duration:  for 6 weeks  Discharge Plan:  Achieve all LTG.  Independent in home treatment program.  Reach maximal therapeutic benefit.    Please refer to the daily flowsheet for treatment today, total treatment time and time spent performing 1:1 timed codes.

## 2020-11-24 ENCOUNTER — THERAPY VISIT (OUTPATIENT)
Dept: PHYSICAL THERAPY | Facility: CLINIC | Age: 70
End: 2020-11-24
Payer: COMMERCIAL

## 2020-11-24 DIAGNOSIS — M25.552 HIP PAIN, LEFT: ICD-10-CM

## 2020-11-24 PROCEDURE — 97110 THERAPEUTIC EXERCISES: CPT | Mod: GP | Performed by: PHYSICAL THERAPIST

## 2020-12-07 ENCOUNTER — THERAPY VISIT (OUTPATIENT)
Dept: PHYSICAL THERAPY | Facility: CLINIC | Age: 70
End: 2020-12-07
Payer: COMMERCIAL

## 2020-12-07 DIAGNOSIS — M25.552 HIP PAIN, LEFT: ICD-10-CM

## 2020-12-07 PROCEDURE — 97110 THERAPEUTIC EXERCISES: CPT | Mod: GP | Performed by: PHYSICAL THERAPIST

## 2020-12-18 ENCOUNTER — MYC MEDICAL ADVICE (OUTPATIENT)
Dept: FAMILY MEDICINE | Facility: CLINIC | Age: 70
End: 2020-12-18

## 2020-12-18 DIAGNOSIS — M25.552 HIP PAIN, LEFT: Primary | ICD-10-CM

## 2020-12-18 NOTE — TELEPHONE ENCOUNTER
Routed to PCP to advise.    Magda BSN-RN  Triage Nurse  [facility]: [facility]      
detailed exam

## 2020-12-31 ENCOUNTER — THERAPY VISIT (OUTPATIENT)
Dept: PHYSICAL THERAPY | Facility: CLINIC | Age: 70
End: 2020-12-31
Payer: COMMERCIAL

## 2020-12-31 DIAGNOSIS — M25.552 HIP PAIN, LEFT: ICD-10-CM

## 2020-12-31 PROCEDURE — 97110 THERAPEUTIC EXERCISES: CPT | Mod: GP | Performed by: PHYSICAL THERAPIST

## 2020-12-31 NOTE — PROGRESS NOTES
Assessment/Plan:    DISCHARGE REPORT    Progress reporting period is from 11/13/2020 to 12/31/2020.       SUBJECTIVE  Subjective changes noted by patient:  Pt reports less pain with sleeping and is overall happy with his PT care.  Subjective: Pt reports he is getting an injection the second week of February with Dr. Mattson. Pt reports he is doing his stretches more often. pt reports his wife had knee surgery on Monday.    Current pain level is 0/10  Previous pain level was  3/10  Changes in function:  Yes (See Goal flowsheet attached for changes in current functional level)  Adverse reaction to treatment or activity: None    OBJECTIVE  Changes noted in objective findings:  Yes, full hip AROM, improved lumbar AROM.  Objective: Hip AROM: full hip ER with MARISSA. Lumbar AROM: full flexion, extension, min loss side glide B with no pain.     ASSESSMENT/PLAN  STG/LTGs have been met or progress has been made towards goals:  Yes (See Goal flow sheet completed today.)  Assessment of Progress: The patient has met all of their long term goals.  Self Management Plans:  Patient has been instructed in a home treatment program.  I have re-evaluated this patient and find that the nature, scope, duration and intensity of the therapy is appropriate for the medical condition of the patient.  Trent continues to require the following intervention to meet STG and LTG's:  PT intervention is no longer required to meet STG/LTG.    Recommendations:  This patient is ready to be discharged from therapy and continue their home treatment program.    Please refer to the daily flowsheet for treatment today, total treatment time and time spent performing 1:1 timed codes.

## 2021-02-12 ENCOUNTER — OFFICE VISIT (OUTPATIENT)
Dept: ORTHOPEDICS | Facility: CLINIC | Age: 71
End: 2021-02-12
Payer: COMMERCIAL

## 2021-02-12 VITALS
WEIGHT: 156 LBS | BODY MASS INDEX: 21.84 KG/M2 | DIASTOLIC BLOOD PRESSURE: 80 MMHG | SYSTOLIC BLOOD PRESSURE: 117 MMHG | HEIGHT: 71 IN

## 2021-02-12 DIAGNOSIS — M25.552 CHRONIC LEFT HIP PAIN: Primary | ICD-10-CM

## 2021-02-12 DIAGNOSIS — M16.12 PRIMARY OSTEOARTHRITIS OF LEFT HIP: ICD-10-CM

## 2021-02-12 DIAGNOSIS — G89.29 CHRONIC LEFT HIP PAIN: Primary | ICD-10-CM

## 2021-02-12 PROCEDURE — 99203 OFFICE O/P NEW LOW 30 MIN: CPT | Performed by: FAMILY MEDICINE

## 2021-02-12 ASSESSMENT — MIFFLIN-ST. JEOR: SCORE: 1481.8

## 2021-02-12 NOTE — PROGRESS NOTES
Trent Estrada  :  1950  DOS: 2021  MRN: [id number]    Sports Medicine Clinic Visit    PCP: Yonis Martin    Trent Estrada is a 70 year old male who is seen in consultation at the request of  Yonis Martin M.D. presenting with chronic left hip pain.    Injury: Gradual onset of waxing & waning left hip pain with activity over the past ~ 6+ years.  Pain located over left deep anterior hip, groin, nonradiating.  Additional Features:  Positive: grinding and weakness.  Symptoms are better with Tylenol and Rest.  Symptoms are worse with: prolonged walking, donning socks, rolling over in bed.  Other evaluation and/or treatments so far consists of: Tylenol and physical therapy.  Recent imaging completed: X-rays completed 20.  Prior History of related problems: chronic hip pain.    Patient has started COVID - 19 vaccination series.  Scheduled for second injection on 21.    Social History: retired - works part-time doing sales    Review of Systems  Musculoskeletal: as above  Remainder of review of systems is negative including constitutional, CV, pulmonary, GI, Skin and Neurologic except as noted in HPI or medical history.    Past Medical History:   Diagnosis Date     Actinic keratoses     sees derm q 6 mo's for this     AR (allergic rhinitis)      Osteoarthritis of both hips      Skin cancer     multiple -- forehead, right ear, etc.     Past Surgical History:   Procedure Laterality Date     C UNLISTED VASCULAR ENDOSCOPY PROC      VAS REVERSAL     right ear skin CA removal and reconstruction  2012    had a skin graft from right side of neck, too     TONSILLECTOMY       VASECTOMY      VAS,REVERSAL,VAS     Family History   Problem Relation Age of Onset     Hypertension Mother          age 94 of CVA     Hypertension Father      Cancer Father      Prostate Cancer Father         in mid 70's,  age 84 of pneumonia     Alzheimer Disease Father      Cancer Son        Objective  BP  "117/80   Ht 1.791 m (5' 10.5\")   Wt 70.8 kg (156 lb)   BMI 22.07 kg/m        General: healthy, alert and in no distress      HEENT: no scleral icterus or conjunctival erythema     Skin: no suspicious lesions or rash. No jaundice.     CV: regular rhythm by palpation, 2+ distal pulses, no pedal edema      Resp: normal respiratory effort without conversational dyspnea     Psych: normal mood and affect      Gait: nonantalgic, appropriate coordination and balance     Neuro: normal light touch sensory exam of the extremities. Motor strength as noted below       Left hip exam    Inspection:        no edema or ecchymosis in hip area    ROM:       Flexion 100        internal rotation 10      external rotation 30      Pain with terminal passive flexion, IR, abduction    Strength:        flexion 5/5       extension 5/5       abduction 5/5       adduction 5/5    Tender:        Mild anterior hip joint    Non Tender:        remainder of hip area       illiac crest       ASIS       greater trochanter       SI joint    Sensation:        grossly intact in hip and thigh    Skin:       well perfused       capillary refill brisk    Special Tests:        positive (+) FADIR    Radiology  Results for orders placed or performed in visit on 11/02/20   XR Hip Left 2-3 Views    Narrative    Examination:  XR HIP LEFT 2-3 VIEWS    Date:  11/2/2020 3:32 PM     Clinical Information: Chronic left hip pain. History of a fall.    Additional Information: none    Comparison: 7/13/2014.      Impression    Impression:    1. Severe left hip arthrosis, with chronic joint space narrowing and  marginal osteophytes. This is moderately progressed from 2014.  Maintained and normal hip joint alignment. No fracture deformity. No  AVN or bone lesion.    CHRISTINA RENTERIA MD       Assessment:  1. Chronic left hip pain    2. Primary osteoarthritis of left hip        Plan:  Discussed the assessment with the patient.  Follow up: plan for March 12, 2021 for US " guided intraarticular CSI trial  Clear hip joint pain by hx, exam, and advanced DJD present on XR  XR images independently visualized and reviewed with patient today in clinic  Reviewed safe low impact activity strategies  PT available in the future prn  Using Tylenol prn for pain working well if needed  Add'l tx options will be based on efficacy and duration of relief from CSI  Deferred CSI today as he is completing his COVID vaccine series, advised waiting until at least 2 wks s/p second vaccination dose to avoid theoretical immunosuppresive effect of CSI  We discussed modified progressive pain-free activity as tolerated  Reviewed option to try compression shorts for activity, may provide comfort/support  Home handouts provided and supportive care reviewed  All questions were answered today  Contact us with additional questions or concerns  Signs and sx of concern reviewed    Thanks very much for sending this nice gentleman to us, I will keep you updated with his progress      Doug Mattson DO, CAQ  Primary Care Sports Medicine  [facility]             Disclaimer: This note consists of symbols derived from keyboarding, dictation and/or voice recognition software. As a result, there may be errors in the script that have gone undetected. Please consider this when interpreting information found in this chart.

## 2021-02-12 NOTE — LETTER
2021         RE: Trent Estrada  [address] [facility] [address]        Dear Colleague,    Thank you for referring your patient, Trent Estrada, to the [facility]. Please see a copy of my visit note below.    Trent Estrada  :  1950  DOS: 2021  MRN: [id number]    Sports Medicine Clinic Visit    PCP: Yonis Martin    Trent Estrada is a 70 year old male who is seen in consultation at the request of  Yonis Martin M.D. presenting with chronic left hip pain.    Injury: Gradual onset of waxing & waning left hip pain with activity over the past ~ 6+ years.  Pain located over left deep anterior hip, groin, nonradiating.  Additional Features:  Positive: grinding and weakness.  Symptoms are better with Tylenol and Rest.  Symptoms are worse with: prolonged walking, donning socks, rolling over in bed.  Other evaluation and/or treatments so far consists of: Tylenol and physical therapy.  Recent imaging completed: X-rays completed 20.  Prior History of related problems: chronic hip pain.    Patient has started COVID - 19 vaccination series.  Scheduled for second injection on 21.    Social History: retired - works part-time doing sales    Review of Systems  Musculoskeletal: as above  Remainder of review of systems is negative including constitutional, CV, pulmonary, GI, Skin and Neurologic except as noted in HPI or medical history.    Past Medical History:   Diagnosis Date     Actinic keratoses     sees derm q 6 mo's for this     AR (allergic rhinitis)      Osteoarthritis of both hips      Skin cancer     multiple -- forehead, right ear, etc.     Past Surgical History:   Procedure Laterality Date     C UNLISTED VASCULAR ENDOSCOPY PROC      VAS REVERSAL     right ear skin CA removal and reconstruction  2012    had a skin graft from right side of neck, too     TONSILLECTOMY       VASECTOMY      VAS,REVERSAL,VAS     Family  "History   Problem Relation Age of Onset     Hypertension Mother          age 94 of CVA     Hypertension Father      Cancer Father      Prostate Cancer Father         in mid 70's,  age 84 of pneumonia     Alzheimer Disease Father      Cancer Son        Objective  /80   Ht 1.791 m (5' 10.5\")   Wt 70.8 kg (156 lb)   BMI 22.07 kg/m        General: healthy, alert and in no distress      HEENT: no scleral icterus or conjunctival erythema     Skin: no suspicious lesions or rash. No jaundice.     CV: regular rhythm by palpation, 2+ distal pulses, no pedal edema      Resp: normal respiratory effort without conversational dyspnea     Psych: normal mood and affect      Gait: nonantalgic, appropriate coordination and balance     Neuro: normal light touch sensory exam of the extremities. Motor strength as noted below       Left hip exam    Inspection:        no edema or ecchymosis in hip area    ROM:       Flexion 100        internal rotation 10      external rotation 30      Pain with terminal passive flexion, IR, abduction    Strength:        flexion 5/5       extension 5/5       abduction 5/5       adduction 5/5    Tender:        Mild anterior hip joint    Non Tender:        remainder of hip area       illiac crest       ASIS       greater trochanter       SI joint    Sensation:        grossly intact in hip and thigh    Skin:       well perfused       capillary refill brisk    Special Tests:        positive (+) FADIR    Radiology  Results for orders placed or performed in visit on 20   XR Hip Left 2-3 Views    Narrative    Examination:  XR HIP LEFT 2-3 VIEWS    Date:  2020 3:32 PM     Clinical Information: Chronic left hip pain. History of a fall.    Additional Information: none    Comparison: 2014.      Impression    Impression:    1. Severe left hip arthrosis, with chronic joint space narrowing and  marginal osteophytes. This is moderately progressed from .  Maintained and normal hip " joint alignment. No fracture deformity. No  AVN or bone lesion.    CHRISTINA RENTERIA MD       Assessment:  1. Chronic left hip pain    2. Primary osteoarthritis of left hip        Plan:  Discussed the assessment with the patient.  Follow up: plan for March 12, 2021 for US guided intraarticular CSI trial  Clear hip joint pain by hx, exam, and advanced DJD present on XR  XR images independently visualized and reviewed with patient today in clinic  Reviewed safe low impact activity strategies  PT available in the future prn  Using Tylenol prn for pain working well if needed  Add'l tx options will be based on efficacy and duration of relief from CSI  Deferred CSI today as he is completing his COVID vaccine series, advised waiting until at least 2 wks s/p second vaccination dose to avoid theoretical immunosuppresive effect of CSI  We discussed modified progressive pain-free activity as tolerated  Reviewed option to try compression shorts for activity, may provide comfort/support  Home handouts provided and supportive care reviewed  All questions were answered today  Contact us with additional questions or concerns  Signs and sx of concern reviewed    Thanks very much for sending this nice gentleman to us, I will keep you updated with his progress      Doug Mattson DO, CAQ  Primary Care Sports Medicine  [facility]             Disclaimer: This note consists of symbols derived from keyboarding, dictation and/or voice recognition software. As a result, there may be errors in the script that have gone undetected. Please consider this when interpreting information found in this chart.      Again, thank you for allowing me to participate in the care of your patient.        Sincerely,        Doug Mattson DO

## 2021-02-15 ENCOUNTER — MYC MEDICAL ADVICE (OUTPATIENT)
Dept: FAMILY MEDICINE | Facility: CLINIC | Age: 71
End: 2021-02-15

## 2021-03-12 ENCOUNTER — OFFICE VISIT (OUTPATIENT)
Dept: ORTHOPEDICS | Facility: CLINIC | Age: 71
End: 2021-03-12
Payer: COMMERCIAL

## 2021-03-12 VITALS — HEIGHT: 71 IN | BODY MASS INDEX: 21.84 KG/M2 | WEIGHT: 156 LBS

## 2021-03-12 DIAGNOSIS — M25.552 CHRONIC LEFT HIP PAIN: ICD-10-CM

## 2021-03-12 DIAGNOSIS — M16.12 PRIMARY OSTEOARTHRITIS OF LEFT HIP: Primary | ICD-10-CM

## 2021-03-12 DIAGNOSIS — G89.29 CHRONIC LEFT HIP PAIN: ICD-10-CM

## 2021-03-12 PROCEDURE — 20611 DRAIN/INJ JOINT/BURSA W/US: CPT | Mod: LT | Performed by: FAMILY MEDICINE

## 2021-03-12 RX ORDER — ROPIVACAINE HYDROCHLORIDE 5 MG/ML
3 INJECTION, SOLUTION EPIDURAL; INFILTRATION; PERINEURAL
Status: DISCONTINUED | OUTPATIENT
Start: 2021-03-12 | End: 2021-05-14 | Stop reason: ALTCHOICE

## 2021-03-12 RX ORDER — TRIAMCINOLONE ACETONIDE 40 MG/ML
40 INJECTION, SUSPENSION INTRA-ARTICULAR; INTRAMUSCULAR
Status: DISCONTINUED | OUTPATIENT
Start: 2021-03-12 | End: 2021-05-14 | Stop reason: ALTCHOICE

## 2021-03-12 RX ADMIN — TRIAMCINOLONE ACETONIDE 40 MG: 40 INJECTION, SUSPENSION INTRA-ARTICULAR; INTRAMUSCULAR at 09:40

## 2021-03-12 RX ADMIN — ROPIVACAINE HYDROCHLORIDE 3 ML: 5 INJECTION, SOLUTION EPIDURAL; INFILTRATION; PERINEURAL at 09:40

## 2021-03-12 ASSESSMENT — MIFFLIN-ST. JEOR: SCORE: 1481.8

## 2021-03-12 NOTE — PROGRESS NOTES
Trent Estrada  :  1950  DOS: 3/12/2021  MRN: [id number]    Sports Medicine Clinic Procedure    Ultrasound Guided Left Intra-Articular Hip Injection    Clinical History: Patient presents for left hip steroid injection as previously discussed.    Diagnosis:   1. Primary osteoarthritis of left hip    2. Chronic left hip pain        Large Joint Injection/Arthocentesis: L hip joint    Date/Time: 3/12/2021 9:40 AM  Performed by: Doug Mattson DO  Authorized by: Doug Mattson DO     Indications:  Pain, diagnostic evaluation and osteoarthritis  Needle Size:  22 G  Guidance: ultrasound    Approach:  Anterior  Location:  Hip      Site:  L hip joint  Medications:  3 mL ropivacaine 5 MG/ML; 40 mg triamcinolone 40 MG/ML  Outcome:  Tolerated well, no immediate complications  Procedure discussed: discussed risks, benefits, and alternatives    Consent Given by:  Patient  Timeout: timeout called immediately prior to procedure    Prep: patient was prepped and draped in usual sterile fashion     4 ml's of 1% lidocaine was used as local anesthetic prior to injection        Impression:  Successful Left intra-articular hip injection.    Plan:  Follow up in 2 weeks if no benefit, otherwise prn based on clinical progress  Expectations and goals of CSI reviewed  Often 2-3 days for steroid effect, and can take up to two weeks for maximum effect  We discussed modified progressive pain-free activity as tolerated  Do not overuse in first two weeks if feeling better due to concern for vulnerability while steroid is working  Supportive care reviewed  All questions were answered today  Contact us with additional questions or concerns  Signs and sx of concern reviewed      Doug Mattson DO, CAQ  Primary Care Sports Medicine  [facility]

## 2021-03-12 NOTE — LETTER
3/12/2021         RE: Trent Estrada  [address] [facility] [address]        Dear Colleague,    Thank you for referring your patient, Trent Estrada, to the [facility]. Please see a copy of my visit note below.    Trent Estrada  :  1950  DOS: 3/12/2021  MRN: [id number]    Sports Medicine Clinic Procedure    Ultrasound Guided Left Intra-Articular Hip Injection    Clinical History: Patient presents for left hip steroid injection as previously discussed.    Diagnosis:   1. Primary osteoarthritis of left hip    2. Chronic left hip pain        Large Joint Injection/Arthocentesis: L hip joint    Date/Time: 3/12/2021 9:40 AM  Performed by: Doug Mattson DO  Authorized by: Doug Mattson DO     Indications:  Pain, diagnostic evaluation and osteoarthritis  Needle Size:  22 G  Guidance: ultrasound    Approach:  Anterior  Location:  Hip      Site:  L hip joint  Medications:  3 mL ropivacaine 5 MG/ML; 40 mg triamcinolone 40 MG/ML  Outcome:  Tolerated well, no immediate complications  Procedure discussed: discussed risks, benefits, and alternatives    Consent Given by:  Patient  Timeout: timeout called immediately prior to procedure    Prep: patient was prepped and draped in usual sterile fashion     4 ml's of 1% lidocaine was used as local anesthetic prior to injection        Impression:  Successful Left intra-articular hip injection.    Plan:  Follow up in 2 weeks if no benefit, otherwise prn based on clinical progress  Expectations and goals of CSI reviewed  Often 2-3 days for steroid effect, and can take up to two weeks for maximum effect  We discussed modified progressive pain-free activity as tolerated  Do not overuse in first two weeks if feeling better due to concern for vulnerability while steroid is working  Supportive care reviewed  All questions were answered today  Contact us with additional questions or concerns  Signs and  sx of concern reviewed      Doug Mattson DO, VIVIAN  Primary Care Sports Medicine  [facility]           Again, thank you for allowing me to participate in the care of your patient.        Sincerely,        Doug Mattson DO

## 2021-05-14 ENCOUNTER — OFFICE VISIT (OUTPATIENT)
Dept: ORTHOPEDICS | Facility: CLINIC | Age: 71
End: 2021-05-14
Payer: COMMERCIAL

## 2021-05-14 VITALS
HEIGHT: 71 IN | WEIGHT: 156 LBS | BODY MASS INDEX: 21.84 KG/M2 | DIASTOLIC BLOOD PRESSURE: 70 MMHG | SYSTOLIC BLOOD PRESSURE: 116 MMHG

## 2021-05-14 DIAGNOSIS — M16.12 PRIMARY OSTEOARTHRITIS OF LEFT HIP: Primary | ICD-10-CM

## 2021-05-14 DIAGNOSIS — M25.552 CHRONIC LEFT HIP PAIN: ICD-10-CM

## 2021-05-14 DIAGNOSIS — G89.29 CHRONIC LEFT HIP PAIN: ICD-10-CM

## 2021-05-14 PROCEDURE — 99213 OFFICE O/P EST LOW 20 MIN: CPT | Mod: 25 | Performed by: FAMILY MEDICINE

## 2021-05-14 PROCEDURE — 20611 DRAIN/INJ JOINT/BURSA W/US: CPT | Mod: LT | Performed by: FAMILY MEDICINE

## 2021-05-14 RX ORDER — TRIAMCINOLONE ACETONIDE 40 MG/ML
40 INJECTION, SUSPENSION INTRA-ARTICULAR; INTRAMUSCULAR
Status: DISCONTINUED | OUTPATIENT
Start: 2021-05-14 | End: 2021-09-17 | Stop reason: ALTCHOICE

## 2021-05-14 RX ORDER — ROPIVACAINE HYDROCHLORIDE 5 MG/ML
3 INJECTION, SOLUTION EPIDURAL; INFILTRATION; PERINEURAL
Status: DISCONTINUED | OUTPATIENT
Start: 2021-05-14 | End: 2021-09-17 | Stop reason: ALTCHOICE

## 2021-05-14 RX ADMIN — TRIAMCINOLONE ACETONIDE 40 MG: 40 INJECTION, SUSPENSION INTRA-ARTICULAR; INTRAMUSCULAR at 09:30

## 2021-05-14 RX ADMIN — ROPIVACAINE HYDROCHLORIDE 3 ML: 5 INJECTION, SOLUTION EPIDURAL; INFILTRATION; PERINEURAL at 09:30

## 2021-05-14 ASSESSMENT — MIFFLIN-ST. JEOR: SCORE: 1481.8

## 2021-05-14 NOTE — PROGRESS NOTES
Trent Estrada  :  1950  DOS:21  MRN: [id number]    Sports Medicine Clinic Visit    PCP: Yonis Martin    Trent Estrada is a 70 year old male who is seen in consultation at the request of  Yonis Martin M.D. presenting with chronic left hip pain.    Injury: Gradual onset of waxing & waning left hip pain with activity over the past ~ 6+ years.  Pain located over left deep anterior hip, groin, nonradiating.  Additional Features:  Positive: grinding and weakness.  Symptoms are better with Tylenol and Rest.  Symptoms are worse with: prolonged walking, donning socks, rolling over in bed.  Other evaluation and/or treatments so far consists of: Tylenol and physical therapy.  Recent imaging completed: X-rays completed 20.  Prior History of related problems: chronic hip pain.    Patient has started COVID - 19 vaccination series.  Scheduled for second injection on 21.    Social History: retired - works part-time doing sales    Interim History - May 14, 2021  Since last visit on 3/12/21 patient has moderate - severe left hip pain.  Left hip intra-articular injection completed on 3/12/21 provided ~ 75% relief for ~ 10 days.  Patient reports progressively worsening pain since that time.  He would like repeat intra-articular injection today.  No new injury in the interim.      Review of Systems  Musculoskeletal: as above  Remainder of review of systems is negative including constitutional, CV, pulmonary, GI, Skin and Neurologic except as noted in HPI or medical history.    Past Medical History:   Diagnosis Date     Actinic keratoses     sees derm q 6 mo's for this     AR (allergic rhinitis)      Osteoarthritis of both hips      Skin cancer     multiple -- forehead, right ear, etc.     Past Surgical History:   Procedure Laterality Date     C UNLISTED VASCULAR ENDOSCOPY PROC      VAS REVERSAL     right ear skin CA removal and reconstruction  2012    had a skin graft from right side of  "neck, too     TONSILLECTOMY       VASECTOMY      VAS,REVERSAL,VAS     Family History   Problem Relation Age of Onset     Hypertension Mother          age 94 of CVA     Hypertension Father      Cancer Father      Prostate Cancer Father         in mid 70's,  age 84 of pneumonia     Alzheimer Disease Father      Cancer Son        Objective  /70   Ht 1.791 m (5' 10.5\")   Wt 70.8 kg (156 lb)   BMI 22.07 kg/m        General: healthy, alert and in no distress      HEENT: no scleral icterus or conjunctival erythema     Skin: no suspicious lesions or rash. No jaundice.     CV: regular rhythm by palpation, 2+ distal pulses, no pedal edema      Resp: normal respiratory effort without conversational dyspnea     Psych: normal mood and affect      Gait: nonantalgic, appropriate coordination and balance     Neuro: normal light touch sensory exam of the extremities. Motor strength as noted below       Left hip exam - similar to previous    Inspection:        no edema or ecchymosis in hip area    ROM:       Flexion 100        internal rotation 10      external rotation 30      Pain with terminal passive flexion, IR, abduction    Strength:        flexion 5/5       extension 5/5       abduction 5/5       adduction 5/5    Tender:        Mild anterior hip joint    Non Tender:        remainder of hip area       illiac crest       ASIS       greater trochanter       SI joint    Sensation:        grossly intact in hip and thigh    Skin:       well perfused       capillary refill brisk    Special Tests:        positive (+) FADIR    Radiology  Results for orders placed or performed in visit on 20   XR Hip Left 2-3 Views    Narrative    Examination:  XR HIP LEFT 2-3 VIEWS    Date:  2020 3:32 PM     Clinical Information: Chronic left hip pain. History of a fall.    Additional Information: none    Comparison: 2014.      Impression    Impression:    1. Severe left hip arthrosis, with chronic joint space narrowing " and  marginal osteophytes. This is moderately progressed from 2014.  Maintained and normal hip joint alignment. No fracture deformity. No  AVN or bone lesion.    CHRISTINA RENTERIA MD     Large Joint Injection/Arthocentesis: L hip joint    Date/Time: 5/14/2021 9:30 AM  Performed by: Doug Mattson DO  Authorized by: Doug Mattson DO     Indications:  Pain, diagnostic evaluation and osteoarthritis  Needle Size:  22 G  Guidance: ultrasound    Approach:  Anterior  Location:  Hip      Site:  L hip joint  Medications:  3 mL ropivacaine 5 MG/ML; 40 mg triamcinolone 40 MG/ML  Outcome:  Tolerated well, no immediate complications  Procedure discussed: discussed risks, benefits, and alternatives    Consent Given by:  Patient  Timeout: timeout called immediately prior to procedure    Prep: patient was prepped and draped in usual sterile fashion     4 ml's of 1% lidocaine was used as local anesthetic prior to injection        Assessment:  1. Primary osteoarthritis of left hip    2. Chronic left hip pain          Plan:  Discussed the assessment with the patient.  Follow up: prn based on clinical progress  Clear hip joint pain by hx, exam, and advanced DJD present on XR  XR images independently visualized and reviewed with patient again today in clinic  Reviewed safe low impact activity strategies  PT available in the future prn  Add'l tx options will be based on efficacy and duration of relief from CSI  Repeat CSI today   Orthopedic surgery referral placed for SONIA discussion, planning likely for Fall  Expectations and goals of CSI reviewed  Often 2-3 days for steroid effect, and can take up to two weeks for maximum effect  We discussed modified progressive pain-free activity as tolerated  Do not overuse in first two weeks if feeling better due to concern for vulnerability while steroid is working  Supportive care reviewed  All questions were answered today  Contact us with additional questions or  concerns  Signs and sx of concern reviewed      Doug Mattson DO, CAQ  Primary Care Sports Medicine  [facility]             Disclaimer: This note consists of symbols derived from keyboarding, dictation and/or voice recognition software. As a result, there may be errors in the script that have gone undetected. Please consider this when interpreting information found in this chart.

## 2021-05-14 NOTE — LETTER
2021         RE: Trent Estrada  [address] [facility] [address]        Dear Colleague,    Thank you for referring your patient, Trent Estrada, to the [facility] [place]. Please see a copy of my visit note below.    Trent Estrada  :  1950  DOS:21  MRN: [id number]    Sports Medicine Clinic Visit    PCP: Yonis Martin    Trent Estrada is a 70 year old male who is seen in consultation at the request of  Yonis Martin M.D. presenting with chronic left hip pain.    Injury: Gradual onset of waxing & waning left hip pain with activity over the past ~ 6+ years.  Pain located over left deep anterior hip, groin, nonradiating.  Additional Features:  Positive: grinding and weakness.  Symptoms are better with Tylenol and Rest.  Symptoms are worse with: prolonged walking, donning socks, rolling over in bed.  Other evaluation and/or treatments so far consists of: Tylenol and physical therapy.  Recent imaging completed: X-rays completed 20.  Prior History of related problems: chronic hip pain.    Patient has started COVID - 19 vaccination series.  Scheduled for second injection on 21.    Social History: retired - works part-time doing sales    Interim History - May 14, 2021  Since last visit on 3/12/21 patient has moderate - severe left hip pain.  Left hip intra-articular injection completed on 3/12/21 provided ~ 75% relief for ~ 10 days.  Patient reports progressively worsening pain since that time.  He would like repeat intra-articular injection today.  No new injury in the interim.      Review of Systems  Musculoskeletal: as above  Remainder of review of systems is negative including constitutional, CV, pulmonary, GI, Skin and Neurologic except as noted in HPI or medical history.    Past Medical History:   Diagnosis Date     Actinic keratoses     sees derm q 6 mo's for this     AR (allergic rhinitis)      Osteoarthritis of both hips   "    Skin cancer     multiple -- forehead, right ear, etc.     Past Surgical History:   Procedure Laterality Date     C UNLISTED VASCULAR ENDOSCOPY PROC      VAS REVERSAL     right ear skin CA removal and reconstruction  2012    had a skin graft from right side of neck, too     TONSILLECTOMY       VASECTOMY      VAS,REVERSAL,VAS     Family History   Problem Relation Age of Onset     Hypertension Mother          age 94 of CVA     Hypertension Father      Cancer Father      Prostate Cancer Father         in mid 70's,  age 84 of pneumonia     Alzheimer Disease Father      Cancer Son        Objective  /70   Ht 1.791 m (5' 10.5\")   Wt 70.8 kg (156 lb)   BMI 22.07 kg/m        General: healthy, alert and in no distress      HEENT: no scleral icterus or conjunctival erythema     Skin: no suspicious lesions or rash. No jaundice.     CV: regular rhythm by palpation, 2+ distal pulses, no pedal edema      Resp: normal respiratory effort without conversational dyspnea     Psych: normal mood and affect      Gait: nonantalgic, appropriate coordination and balance     Neuro: normal light touch sensory exam of the extremities. Motor strength as noted below       Left hip exam - similar to previous    Inspection:        no edema or ecchymosis in hip area    ROM:       Flexion 100        internal rotation 10      external rotation 30      Pain with terminal passive flexion, IR, abduction    Strength:        flexion 5/5       extension 5/5       abduction 5/5       adduction 5/5    Tender:        Mild anterior hip joint    Non Tender:        remainder of hip area       illiac crest       ASIS       greater trochanter       SI joint    Sensation:        grossly intact in hip and thigh    Skin:       well perfused       capillary refill brisk    Special Tests:        positive (+) FADIR    Radiology  Results for orders placed or performed in visit on 20   XR Hip Left 2-3 Views    Narrative    Examination:  XR " HIP LEFT 2-3 VIEWS    Date:  11/2/2020 3:32 PM     Clinical Information: Chronic left hip pain. History of a fall.    Additional Information: none    Comparison: 7/13/2014.      Impression    Impression:    1. Severe left hip arthrosis, with chronic joint space narrowing and  marginal osteophytes. This is moderately progressed from 2014.  Maintained and normal hip joint alignment. No fracture deformity. No  AVN or bone lesion.    CHRISTINA RENTERIA MD     Large Joint Injection/Arthocentesis: L hip joint    Date/Time: 5/14/2021 9:30 AM  Performed by: Doug Mattson DO  Authorized by: Doug Mattson DO     Indications:  Pain, diagnostic evaluation and osteoarthritis  Needle Size:  22 G  Guidance: ultrasound    Approach:  Anterior  Location:  Hip      Site:  L hip joint  Medications:  3 mL ropivacaine 5 MG/ML; 40 mg triamcinolone 40 MG/ML  Outcome:  Tolerated well, no immediate complications  Procedure discussed: discussed risks, benefits, and alternatives    Consent Given by:  Patient  Timeout: timeout called immediately prior to procedure    Prep: patient was prepped and draped in usual sterile fashion     4 ml's of 1% lidocaine was used as local anesthetic prior to injection        Assessment:  1. Primary osteoarthritis of left hip    2. Chronic left hip pain          Plan:  Discussed the assessment with the patient.  Follow up: prn based on clinical progress  Clear hip joint pain by hx, exam, and advanced DJD present on XR  XR images independently visualized and reviewed with patient again today in clinic  Reviewed safe low impact activity strategies  PT available in the future prn  Add'l tx options will be based on efficacy and duration of relief from CSI  Repeat CSI today   Orthopedic surgery referral placed for SONIA discussion, planning likely for Fall  Expectations and goals of CSI reviewed  Often 2-3 days for steroid effect, and can take up to two weeks for maximum effect  We discussed  modified progressive pain-free activity as tolerated  Do not overuse in first two weeks if feeling better due to concern for vulnerability while steroid is working  Supportive care reviewed  All questions were answered today  Contact us with additional questions or concerns  Signs and sx of concern reviewed      Doug Mattson DO, VIVIAN  Primary Care Sports Medicine  [facility]             Disclaimer: This note consists of symbols derived from keyboarding, dictation and/or voice recognition software. As a result, there may be errors in the script that have gone undetected. Please consider this when interpreting information found in this chart.      Again, thank you for allowing me to participate in the care of your patient.        Sincerely,        Doug Mattson DO

## 2021-06-08 ENCOUNTER — OFFICE VISIT (OUTPATIENT)
Dept: ORTHOPEDICS | Facility: CLINIC | Age: 71
End: 2021-06-08
Payer: COMMERCIAL

## 2021-06-08 VITALS
SYSTOLIC BLOOD PRESSURE: 112 MMHG | DIASTOLIC BLOOD PRESSURE: 80 MMHG | BODY MASS INDEX: 20.3 KG/M2 | HEIGHT: 71 IN | WEIGHT: 145 LBS

## 2021-06-08 DIAGNOSIS — M25.552 CHRONIC LEFT HIP PAIN: ICD-10-CM

## 2021-06-08 DIAGNOSIS — M16.12 PRIMARY OSTEOARTHRITIS OF LEFT HIP: ICD-10-CM

## 2021-06-08 DIAGNOSIS — G89.29 CHRONIC LEFT HIP PAIN: ICD-10-CM

## 2021-06-08 PROCEDURE — 99203 OFFICE O/P NEW LOW 30 MIN: CPT | Performed by: STUDENT IN AN ORGANIZED HEALTH CARE EDUCATION/TRAINING PROGRAM

## 2021-06-08 ASSESSMENT — HOOS S4: HOW SEVERE IS YOUR HIP JOINT STIFFNESS AFTER FIRST WAKENING IN THE MORNING?: MODERATE

## 2021-06-08 ASSESSMENT — ACTIVITIES OF DAILY LIVING (ADL)
ADL_SUBSCALE_SCORE: 64.7
ADL_MEAN: 1.41
ADL_SUM: 24

## 2021-06-08 ASSESSMENT — MIFFLIN-ST. JEOR: SCORE: 1431.91

## 2021-06-08 NOTE — PROGRESS NOTES
"    [facility]  Orthopaedic Surgery Consultation by Chepe Marquez M.D.    Trent Estrada MRN# [id number]   Age: 70 year old YOB: 1950     Requesting physician: Yonis Browning     Background history:  DX:  1. Bilateral osteoarthritis of hips           History of Present Illness:   70 year old male who presents to our clinic referred by Dr. Mattson because of chronic left hip pain.  Pain is experienced in the groin and anterior left hip.  Pain does not radiate.  No antecedent trauma.  Patient endorses the presence of night pain, initiation stiffness and soreness.  He is not limited in his activity level however it is painful.  He occasionally uses over-the-counter analgesics to which the pain responds well.  Approximately 1 month ago he received an intra-articular injection with a combination of lidocaine and cortisone which is still providing significant relief.  Patient states that \" if I could continue to live like this I would be happy\".  He has done multiple physical therapy sessions and cycles in the past.  He continues to stretch every day.  Patient does not endorse the presence of any back pain.    Social:   Occupation: Part-time sales  Living situation: lives with wife  Hobbies / Sports: golf, sailing, downhill skiing    Smoking: No  Alcohol: No  Illicit drug use: No         Physical Exam:     EXAMINATION pertinent findings:   PSYCH: Pleasant, healthy-appearing, alert, oriented x3, cooperative. Normal mood and affect.  VITAL SIGNS: There were no vitals taken for this visit..  Reviewed nursing intake notes.   There is no height or weight on file to calculate BMI.  RESP: non labored breathing   ABD: benign, soft, non-tender, no acute peritoneal findings  SKIN: grossly normal   LYMPHATIC: grossly normal, no adenopathy, no extremity edema  NEURO: grossly normal , no motor deficits  VASCULAR: satisfactory perfusion of all extremities   MUSCULOSKELETAL:   Alignment: " Neutral  Gait: Normal  Level pelvis.  Lasegue's test negative.   L hip: ROM    , Extension 0  , IRF 10  , ERF 30  , ABD 30  , ADD 10  .  Rotations are recognizably painful.  No tenderness to palpation over the greater trochanteric region.     Left LE:   Thigh and leg compartments soft and compressible   +Quad/TA/GSC/FHL/EHL   SILT DP/SP/Alicia/Saph/Tib nerve distributions   Palpable dorsalis pedis pulse              Data:   All laboratory data reviewed  All imaging studies reviewed by me personally.    XR pelvis/hip left 6/8/2021:  My interpretation: Moderate to severe osteoarthritic changes of the left hip with joint space narrowing, presence of marginal osteophytes, sclerosis and subchondral cyst.            Assessment and Plan:   Assessment:  70-year-old male with chronic pain of left hip due to moderate to severe osteoarthritic changes which currently seem to be responding well to nonoperative treatment measures.    Plan:  We had a long discussion with the patient regarding ongoing management options.  Reviewed surgical and nonsurgical treatments.  The non-operative management options consist of activity modification, pain medication, PT and injection therapy. We reviewed total hip replacement in detail including the procedure, the implants, the recovery process, and long-term outcomes.    We discussed that I would not advise downhill skiing after hip replacement surgery.  We reviewed that the risks of the surgery include but are not limited to infection, wound problems, dislocation, persistent pain, leg length discrepancy, loosening, revision surgery.  We also reviewed less common risks such as neurovascular injury fracture, and other implant-related issues.  We reviewed other medical complications such as a blood clot which we would be mitigating by oral anticoagulants.  We discussed that the vast majority of cases have a highly successful outcome.  However there is a small subset of patients that do  experience complications or problems following the hip replacement.  Based on a discussion of the risks and benefits, and the fact that patient is currently experiencing significant relief from the intra-articular injection it would be my recommendation to continue to pursue nonoperative treatment measures.  In his particular case this would consist of repeat injection when necessary augmented by the use of NSAIDs and continued exercises and stretching.  Once these nonoperative treatment options do not adequately provide believe replacement surgery could be considered.  Patient understands and agrees to the treatment plan as set forth.  He will contact us when his complaints no longer respond adequately to nonoperative treatment measures.    More information on joint replacements can be found on : [email or web address]      Thank you for your referral.      Chepe Marquez MD, PhD     Adult Reconstruction  [facility] Department of Orthopaedic Surgery  Pager [phone]      DATA for DOCUMENTATION:         Past Medical History:     Patient Active Problem List   Diagnosis     Actinic keratoses     Advanced directives, counseling/discussion     History of skin cancer     Primary osteoarthritis of left hip     Past Medical History:   Diagnosis Date     Actinic keratoses     sees derm q 6 mo's for this     AR (allergic rhinitis)      Osteoarthritis of both hips 7/14     Skin cancer     multiple -- forehead, right ear, etc.       Also see scanned health assessment forms.       Past Surgical History:     Past Surgical History:   Procedure Laterality Date     C UNLISTED VASCULAR ENDOSCOPY PROC      VAS REVERSAL     right ear skin CA removal and reconstruction  fall 2012    had a skin graft from right side of neck, too     TONSILLECTOMY       VASECTOMY      VAS,REVERSAL,VAS            Social History:     Social History     Socioeconomic  History     Marital status:      Spouse name: Not on file     Number of children: 2     Years of education: Not on file     Highest education level: Not on file   Occupational History     Employer: [employer]   Social Needs     Financial resource strain: Not on file     Food insecurity     Worry: Not on file     Inability: Not on file     Transportation needs     Medical: Not on file     Non-medical: Not on file   Tobacco Use     Smoking status: Never Smoker     Smokeless tobacco: Never Used   Substance and Sexual Activity     Alcohol use: No     Drug use: No     Sexual activity: Yes     Partners: Female   Lifestyle     Physical activity     Days per week: Not on file     Minutes per session: Not on file     Stress: Not on file   Relationships     Social connections     Talks on phone: Not on file     Gets together: Not on file     Attends Zoroastrian service: Not on file     Active member of club or organization: Not on file     Attends meetings of clubs or organizations: Not on file     Relationship status: Not on file     Intimate partner violence     Fear of current or ex partner: Not on file     Emotionally abused: Not on file     Physically abused: Not on file     Forced sexual activity: Not on file   Other Topics Concern     Parent/sibling w/ CABG, MI or angioplasty before 65F 55M? No   Social History Narrative     Not on file            Family History:       Family History   Problem Relation Age of Onset     Hypertension Mother          age 94 of CVA     Hypertension Father      Cancer Father      Prostate Cancer Father         in mid 70's,  age 84 of pneumonia     Alzheimer Disease Father      Cancer Son             Medications:     Current Outpatient Medications   Medication Sig     NO ACTIVE MEDICATIONS      Current Facility-Administered Medications   Medication     ropivacaine (NAROPIN) injection 3 mL     triamcinolone (KENALOG-40) injection 40 mg              Review of  Systems:   A comprehensive 10 point review of systems (constitutional, ENT, cardiac, peripheral vascular, lymphatic, respiratory, GI, , Musculoskeletal, skin, Neurological) was performed and found to be negative except as described in this note.     See intake form completed by patient

## 2021-06-08 NOTE — LETTER
"    6/8/2021         RE: Trent Estrada  [address] [facility] [address]        Dear Colleague,    Thank you for referring your patient, Trent Estrada, to the [facility] [place]. Please see a copy of my visit note below.        [facility]  Orthopaedic Surgery Consultation by Chepe Marquez M.D.    Trent Estrada MRN# [id number]   Age: 70 year old YOB: 1950     Requesting physician: Yonis Browning     Background history:  DX:  1. Bilateral osteoarthritis of hips           History of Present Illness:   70 year old male who presents to our clinic referred by Dr. Mattson because of chronic left hip pain.  Pain is experienced in the groin and anterior left hip.  Pain does not radiate.  No antecedent trauma.  Patient endorses the presence of night pain, initiation stiffness and soreness.  He is not limited in his activity level however it is painful.  He occasionally uses over-the-counter analgesics to which the pain responds well.  Approximately 1 month ago he received an intra-articular injection with a combination of lidocaine and cortisone which is still providing significant relief.  Patient states that \" if I could continue to live like this I would be happy\".  He has done multiple physical therapy sessions and cycles in the past.  He continues to stretch every day.  Patient does not endorse the presence of any back pain.    Social:   Occupation: Part-time sales  Living situation: lives with wife  Hobbies / Sports: golf, sailing, downhill skiing    Smoking: No  Alcohol: No  Illicit drug use: No         Physical Exam:     EXAMINATION pertinent findings:   PSYCH: Pleasant, healthy-appearing, alert, oriented x3, cooperative. Normal mood and affect.  VITAL SIGNS: There were no vitals taken for this visit..  Reviewed nursing intake notes.   There is no height or weight on file to calculate BMI.  RESP: non labored breathing "   ABD: benign, soft, non-tender, no acute peritoneal findings  SKIN: grossly normal   LYMPHATIC: grossly normal, no adenopathy, no extremity edema  NEURO: grossly normal , no motor deficits  VASCULAR: satisfactory perfusion of all extremities   MUSCULOSKELETAL:   Alignment: Neutral  Gait: Normal  Level pelvis.  Lasegue's test negative.   L hip: ROM    , Extension 0  , IRF 10  , ERF 30  , ABD 30  , ADD 10  .  Rotations are recognizably painful.  No tenderness to palpation over the greater trochanteric region.     Left LE:   Thigh and leg compartments soft and compressible   +Quad/TA/GSC/FHL/EHL   SILT DP/SP/Ailcia/Saph/Tib nerve distributions   Palpable dorsalis pedis pulse              Data:   All laboratory data reviewed  All imaging studies reviewed by me personally.    XR pelvis/hip left 6/8/2021:  My interpretation: Moderate to severe osteoarthritic changes of the left hip with joint space narrowing, presence of marginal osteophytes, sclerosis and subchondral cyst.            Assessment and Plan:   Assessment:  70-year-old male with chronic pain of left hip due to moderate to severe osteoarthritic changes which currently seem to be responding well to nonoperative treatment measures.    Plan:  We had a long discussion with the patient regarding ongoing management options.  Reviewed surgical and nonsurgical treatments.  The non-operative management options consist of activity modification, pain medication, PT and injection therapy. We reviewed total hip replacement in detail including the procedure, the implants, the recovery process, and long-term outcomes.    We discussed that I would not advise downhill skiing after hip replacement surgery.  We reviewed that the risks of the surgery include but are not limited to infection, wound problems, dislocation, persistent pain, leg length discrepancy, loosening, revision surgery.  We also reviewed less common risks such as neurovascular injury fracture, and other  implant-related issues.  We reviewed other medical complications such as a blood clot which we would be mitigating by oral anticoagulants.  We discussed that the vast majority of cases have a highly successful outcome.  However there is a small subset of patients that do experience complications or problems following the hip replacement.  Based on a discussion of the risks and benefits, and the fact that patient is currently experiencing significant relief from the intra-articular injection it would be my recommendation to continue to pursue nonoperative treatment measures.  In his particular case this would consist of repeat injection when necessary augmented by the use of NSAIDs and continued exercises and stretching.  Once these nonoperative treatment options do not adequately provide believe replacement surgery could be considered.  Patient understands and agrees to the treatment plan as set forth.  He will contact us when his complaints no longer respond adequately to nonoperative treatment measures.    More information on joint replacements can be found on : [email or web address]      Thank you for your referral.      Chepe Marquez MD, PhD     Adult Reconstruction  [facility] Department of Orthopaedic Surgery  Pager [phone]      DATA for DOCUMENTATION:         Past Medical History:     Patient Active Problem List   Diagnosis     Actinic keratoses     Advanced directives, counseling/discussion     History of skin cancer     Primary osteoarthritis of left hip     Past Medical History:   Diagnosis Date     Actinic keratoses     sees derm q 6 mo's for this     AR (allergic rhinitis)      Osteoarthritis of both hips 7/14     Skin cancer     multiple -- forehead, right ear, etc.       Also see scanned health assessment forms.       Past Surgical History:     Past Surgical History:   Procedure Laterality Date     C UNLISTED  VASCULAR ENDOSCOPY PROC      VAS REVERSAL     right ear skin CA removal and reconstruction  2012    had a skin graft from right side of neck, too     TONSILLECTOMY       VASECTOMY      VAS,REVERSAL,VAS            Social History:     Social History     Socioeconomic History     Marital status:      Spouse name: Not on file     Number of children: 2     Years of education: Not on file     Highest education level: Not on file   Occupational History     Employer: [employer]   Social Needs     Financial resource strain: Not on file     Food insecurity     Worry: Not on file     Inability: Not on file     Transportation needs     Medical: Not on file     Non-medical: Not on file   Tobacco Use     Smoking status: Never Smoker     Smokeless tobacco: Never Used   Substance and Sexual Activity     Alcohol use: No     Drug use: No     Sexual activity: Yes     Partners: Female   Lifestyle     Physical activity     Days per week: Not on file     Minutes per session: Not on file     Stress: Not on file   Relationships     Social connections     Talks on phone: Not on file     Gets together: Not on file     Attends [employer]ist service: Not on file     Active member of club or organization: Not on file     Attends meetings of clubs or organizations: Not on file     Relationship status: Not on file     Intimate partner violence     Fear of current or ex partner: Not on file     Emotionally abused: Not on file     Physically abused: Not on file     Forced sexual activity: Not on file   Other Topics Concern     Parent/sibling w/ CABG, MI or angioplasty before 65F 55M? No   Social History Narrative     Not on file            Family History:       Family History   Problem Relation Age of Onset     Hypertension Mother          age 94 of CVA     Hypertension Father      Cancer Father      Prostate Cancer Father         in mid 70's,  age 84 of pneumonia     Alzheimer Disease Father      Cancer Son              Medications:     Current Outpatient Medications   Medication Sig     NO ACTIVE MEDICATIONS      Current Facility-Administered Medications   Medication     ropivacaine (NAROPIN) injection 3 mL     triamcinolone (KENALOG-40) injection 40 mg              Review of Systems:   A comprehensive 10 point review of systems (constitutional, ENT, cardiac, peripheral vascular, lymphatic, respiratory, GI, , Musculoskeletal, skin, Neurological) was performed and found to be negative except as described in this note.     See intake form completed by patient          Again, thank you for allowing me to participate in the care of your patient.        Sincerely,        Chepe Marquez MD

## 2021-08-24 ASSESSMENT — ENCOUNTER SYMPTOMS
JOINT SWELLING: 0
PALPITATIONS: 0
HEADACHES: 0
SORE THROAT: 0
NERVOUS/ANXIOUS: 0
EYE PAIN: 0
FEVER: 0
DIZZINESS: 0
COUGH: 0
MYALGIAS: 0
FREQUENCY: 0
HEMATURIA: 0
ARTHRALGIAS: 0
DIARRHEA: 0
ABDOMINAL PAIN: 0
CONSTIPATION: 0
CHILLS: 0
WEAKNESS: 0
NAUSEA: 0
HEARTBURN: 0
DYSURIA: 0
SHORTNESS OF BREATH: 0
PARESTHESIAS: 0
HEMATOCHEZIA: 0

## 2021-08-24 ASSESSMENT — ACTIVITIES OF DAILY LIVING (ADL): CURRENT_FUNCTION: NO ASSISTANCE NEEDED

## 2021-08-27 ENCOUNTER — OFFICE VISIT (OUTPATIENT)
Dept: FAMILY MEDICINE | Facility: CLINIC | Age: 71
End: 2021-08-27
Payer: COMMERCIAL

## 2021-08-27 VITALS
OXYGEN SATURATION: 98 % | HEART RATE: 55 BPM | TEMPERATURE: 97.9 F | SYSTOLIC BLOOD PRESSURE: 133 MMHG | DIASTOLIC BLOOD PRESSURE: 84 MMHG | WEIGHT: 154 LBS | HEIGHT: 70 IN | BODY MASS INDEX: 22.05 KG/M2

## 2021-08-27 DIAGNOSIS — L57.0 ACTINIC KERATOSES: ICD-10-CM

## 2021-08-27 DIAGNOSIS — M16.12 PRIMARY OSTEOARTHRITIS OF LEFT HIP: ICD-10-CM

## 2021-08-27 DIAGNOSIS — Z00.00 ENCOUNTER FOR MEDICARE ANNUAL WELLNESS EXAM: Primary | ICD-10-CM

## 2021-08-27 DIAGNOSIS — Z85.828 HISTORY OF SKIN CANCER: ICD-10-CM

## 2021-08-27 PROCEDURE — G0438 PPPS, INITIAL VISIT: HCPCS | Performed by: FAMILY MEDICINE

## 2021-08-27 ASSESSMENT — ENCOUNTER SYMPTOMS
HEARTBURN: 0
FEVER: 0
CHILLS: 0
NERVOUS/ANXIOUS: 0
COUGH: 0
MYALGIAS: 0
HEMATOCHEZIA: 0
HEMATURIA: 0
WEAKNESS: 0
DIARRHEA: 0
SORE THROAT: 0
PARESTHESIAS: 0
CONSTIPATION: 0
EYE PAIN: 0
FREQUENCY: 0
ABDOMINAL PAIN: 0
ARTHRALGIAS: 0
HEADACHES: 0
DIZZINESS: 0
NAUSEA: 0
SHORTNESS OF BREATH: 0
PALPITATIONS: 0
DYSURIA: 0
JOINT SWELLING: 0

## 2021-08-27 ASSESSMENT — ACTIVITIES OF DAILY LIVING (ADL): CURRENT_FUNCTION: NO ASSISTANCE NEEDED

## 2021-08-27 ASSESSMENT — MIFFLIN-ST. JEOR: SCORE: 1472.28

## 2021-08-27 NOTE — PATIENT INSTRUCTIONS
Patient Education   Personalized Prevention Plan  You are due for the preventive services outlined below.  Your care team is available to assist you in scheduling these services.  If you have already completed any of these items, please share that information with your care team to update in your medical record.  Health Maintenance Due   Topic Date Due     ANNUAL REVIEW OF HM ORDERS  Never done     AORTIC ANEURYSM SCREENING (SYSTEM ASSIGNED)  Never done     FALL RISK ASSESSMENT  08/26/2021     Annual Wellness Visit  08/26/2021     Flu Vaccine (1) 09/01/2021

## 2021-08-27 NOTE — PROGRESS NOTES
"SUBJECTIVE:   Trent Estrada is a 70 year old male who presents for a Preventive Visit.      Patient has been advised of split billing requirements and indicates understanding: Yes   Are you in the first 12 months of your Medicare coverage?  No    Healthy Habits:     In general, how would you rate your overall health?  Excellent    Frequency of exercise:  2-3 days/week    Duration of exercise:  30-45 minutes    Do you usually eat at least 4 servings of fruit and vegetables a day, include whole grains    & fiber and avoid regularly eating high fat or \"junk\" foods?  No    Taking medications regularly:  Yes    Medication side effects:  None    Ability to successfully perform activities of daily living:  No assistance needed    Home Safety:  No safety concerns identified    Hearing Impairment:  Difficulty following a conversation in a noisy restaurant or crowded room    In the past 6 months, have you been bothered by leaking of urine?  No    In general, how would you rate your overall mental or emotional health?  Excellent      PHQ-2 Total Score: 0    Additional concerns today:  No    Do you feel safe in your environment? Yes    Have you ever done Advance Care Planning? (For example, a Health Directive, POLST, or a discussion with a medical provider or your loved ones about your wishes): Yes, patient states has an Advance Care Planning document and will bring a copy to the clinic.       Fall risk  Fallen 2 or more times in the past year?: No  Any fall with injury in the past year?: No    Cognitive Screening   1) Repeat 3 items (Leader, Season, Table)    2) Clock draw: NORMAL  3) 3 item recall: Recalls 3 objects  Results: 3 items recalled: COGNITIVE IMPAIRMENT LESS LIKELY    Mini-CogTM Copyright FIORDALIZA Kimball. Licensed by the author for use in [facility]; reprinted with permission (julian@.[facility]). All rights reserved.      Do you have sleep apnea, excessive snoring or daytime drowsiness?: no    Reviewed and " updated as needed this visit by clinical staff  Tobacco  Allergies  Meds   Med Hx  Surg Hx  Fam Hx  Soc Hx        Reviewed and updated as needed this visit by Provider                Social History     Tobacco Use     Smoking status: Never Smoker     Smokeless tobacco: Never Used   Substance Use Topics     Alcohol use: No         Alcohol Use 8/24/2021   Prescreen: >3 drinks/day or >7 drinks/week? No   Prescreen: >3 drinks/day or >7 drinks/week? -     He has had a couple of cortisone injections for his left hip.  The first one did not do much, but the second one has been quite helpful and is just wearing off now after about 3-1/2 months.  He would prefer to do periodic cortisone injections rather than take an NSAID routinely.  He is also planning to hold off on a left hip replacement at this time.  He is otherwise feeling well.  He remains physically active.  He enjoys downhill skiing in the winter.  He enjoys sailing in the summertime.  He plays some golf and does other activities as well.  He is on no prescription medications.  He continues to see dermatology every 6 months or so and will be seeing them again next month.    Current providers sharing in care for this patient include:   Patient Care Team:  Yonis Martin MD as PCP - General  Yonis Martin MD as Assigned PCP  Doug Mattson DO as Assigned Musculoskeletal Provider    The following health maintenance items are reviewed in Epic and correct as of today:  Health Maintenance Due   Topic Date Due     ANNUAL REVIEW OF  ORDERS  Never done     AORTIC ANEURYSM SCREENING (SYSTEM ASSIGNED)  Never done     FALL RISK ASSESSMENT  08/26/2021     INFLUENZA VACCINE (1) 09/01/2021     Patient Active Problem List   Diagnosis     Actinic keratoses     Advanced directives, counseling/discussion     History of skin cancer     Primary osteoarthritis of left hip     Past Surgical History:   Procedure Laterality Date     C UNLISTED VASCULAR ENDOSCOPY PROC  "     VAS REVERSAL     right ear skin CA removal and reconstruction  2012    had a skin graft from right side of neck, too     TONSILLECTOMY       VASECTOMY      VAS,REVERSAL,VAS       Social History     Tobacco Use     Smoking status: Never Smoker     Smokeless tobacco: Never Used   Substance Use Topics     Alcohol use: No     Family History   Problem Relation Age of Onset     Hypertension Mother          age 94 of CVA     Hypertension Father      Cancer Father      Prostate Cancer Father         in mid 70's,  age 84 of pneumonia     Alzheimer Disease Father      Cancer Son          Current Outpatient Medications   Medication Sig Dispense Refill     NO ACTIVE MEDICATIONS        No Known Allergies          Review of Systems   Constitutional: Negative for chills and fever.   HENT: Negative for congestion, ear pain, hearing loss and sore throat.    Eyes: Negative for pain and visual disturbance.   Respiratory: Negative for cough and shortness of breath.    Cardiovascular: Negative for chest pain, palpitations and peripheral edema.   Gastrointestinal: Negative for abdominal pain, constipation, diarrhea, heartburn, hematochezia and nausea.   Genitourinary: Negative for discharge, dysuria, frequency, genital sores, hematuria, impotence and urgency.   Musculoskeletal: Negative for arthralgias, joint swelling and myalgias.   Skin: Negative for rash.   Neurological: Negative for dizziness, weakness, headaches and paresthesias.   Psychiatric/Behavioral: Negative for mood changes. The patient is not nervous/anxious.          OBJECTIVE:   /84 (BP Location: Left arm, Patient Position: Sitting, Cuff Size: Adult Regular)   Pulse 55   Temp 97.9  F (36.6  C) (Oral)   Ht 1.79 m (' 10.47\")   Wt 69.9 kg (154 lb)   SpO2 98%   BMI 21.80 kg/m   Estimated body mass index is 21.8 kg/m  as calculated from the following:    Height as of this encounter: 1.79 m (5' 10.47\").    Weight as of this encounter: 69.9 kg (154 " "lb).  Physical Exam  GENERAL: healthy, alert and no distress  EYES: Eyes grossly normal to inspection, PERRL and conjunctivae and sclerae normal  HENT: Grossly normal  NECK: Supple  RESP: lungs clear to auscultation - no rales, rhonchi or wheezes  CV: regular rate and rhythm, normal S1 S2, no S3 or S4, no murmur, click or rub, no peripheral edema and peripheral pulses strong  ABDOMEN: soft, nontender, no hepatosplenomegaly, no masses   MS: no gross musculoskeletal defects noted, no edema  SKIN: Some scattered actinic changes on his face and scalp  NEURO: Normal strength and tone, mentation intact and speech normal  PSYCH: mentation appears normal, affect normal/bright    Diagnostic Test Results:  Labs reviewed in Epic    ASSESSMENT / PLAN:       ICD-10-CM    1. Encounter for Medicare annual wellness exam  Z00.00    2. Primary osteoarthritis of left hip  M16.12    3. Actinic keratoses  L57.0    4. History of skin cancer  Z85.828      Blood pressure and other vital signs look acceptable  We discussed the above items  He will continue with periodic cortisone injections for his left hip arthritis  Follow through with dermatology visit next month as planned  I advised him to get a flu shot in the fall and a Covid vaccine booster as well  Plan a recheck in 1 year, or sooner prn    Patient has been advised of split billing requirements and indicates understanding: Yes  COUNSELING:  Reviewed preventive health counseling, as reflected in patient instructions       Regular exercise       Healthy diet/nutrition    Estimated body mass index is 21.8 kg/m  as calculated from the following:    Height as of this encounter: 1.79 m (5' 10.47\").    Weight as of this encounter: 69.9 kg (154 lb).        He reports that he has never smoked. He has never used smokeless tobacco.      Appropriate preventive services were discussed with this patient, including applicable screening as appropriate for cardiovascular disease, diabetes, " osteopenia/osteoporosis, and glaucoma.  As appropriate for age/gender, discussed screening for colorectal cancer, prostate cancer, breast cancer, and cervical cancer. Checklist reviewing preventive services available has been given to the patient.    Reviewed patients plan of care and provided an AVS. The Basic Care Plan (routine screening as documented in Health Maintenance) for Trent meets the Care Plan requirement. This Care Plan has been established and reviewed with the Patient.    Counseling Resources:  ATP IV Guidelines  Pooled Cohorts Equation Calculator  Breast Cancer Risk Calculator  Breast Cancer: Medication to Reduce Risk  FRAX Risk Assessment  ICSI Preventive Guidelines  Dietary Guidelines for Americans, 2010  USDA's MyPlate  ASA Prophylaxis  Lung CA Screening    Yonis Martin MD  [facility]    Identified Health Risks:

## 2021-09-17 ENCOUNTER — OFFICE VISIT (OUTPATIENT)
Dept: ORTHOPEDICS | Facility: CLINIC | Age: 71
End: 2021-09-17
Payer: COMMERCIAL

## 2021-09-17 VITALS
BODY MASS INDEX: 22.05 KG/M2 | HEIGHT: 70 IN | SYSTOLIC BLOOD PRESSURE: 118 MMHG | DIASTOLIC BLOOD PRESSURE: 79 MMHG | WEIGHT: 154 LBS

## 2021-09-17 DIAGNOSIS — M25.552 CHRONIC LEFT HIP PAIN: ICD-10-CM

## 2021-09-17 DIAGNOSIS — G89.29 CHRONIC LEFT HIP PAIN: ICD-10-CM

## 2021-09-17 DIAGNOSIS — M16.12 PRIMARY OSTEOARTHRITIS OF LEFT HIP: Primary | ICD-10-CM

## 2021-09-17 PROCEDURE — 20611 DRAIN/INJ JOINT/BURSA W/US: CPT | Mod: LT | Performed by: FAMILY MEDICINE

## 2021-09-17 PROCEDURE — 99213 OFFICE O/P EST LOW 20 MIN: CPT | Mod: 25 | Performed by: FAMILY MEDICINE

## 2021-09-17 RX ORDER — ROPIVACAINE HYDROCHLORIDE 5 MG/ML
3 INJECTION, SOLUTION EPIDURAL; INFILTRATION; PERINEURAL
Status: SHIPPED | OUTPATIENT
Start: 2021-09-17

## 2021-09-17 RX ORDER — TRIAMCINOLONE ACETONIDE 40 MG/ML
40 INJECTION, SUSPENSION INTRA-ARTICULAR; INTRAMUSCULAR
Status: SHIPPED | OUTPATIENT
Start: 2021-09-17

## 2021-09-17 RX ADMIN — TRIAMCINOLONE ACETONIDE 40 MG: 40 INJECTION, SUSPENSION INTRA-ARTICULAR; INTRAMUSCULAR at 08:28

## 2021-09-17 RX ADMIN — ROPIVACAINE HYDROCHLORIDE 3 ML: 5 INJECTION, SOLUTION EPIDURAL; INFILTRATION; PERINEURAL at 08:28

## 2021-09-17 ASSESSMENT — MIFFLIN-ST. JEOR: SCORE: 1472.25

## 2021-09-17 NOTE — LETTER
2021         RE: Trent Estrada  [address] [id number]        Dear Colleague,    Thank you for referring your patient, Trent Estrada, to the [facility]. Please see a copy of my visit note below.    Trent Estrada  :  1950  DOS:21  MRN: [id number]    Sports Medicine Clinic Visit    PCP: Yonis Martin    Trent Estrada is a 70 year old male who is seen in consultation at the request of  Yonis Martin M.D. presenting with chronic left hip pain.    Injury: Gradual onset of waxing & waning left hip pain with activity over the past ~ 6+ years.  Pain located over left deep anterior hip, groin, nonradiating.  Additional Features:  Positive: grinding and weakness.  Symptoms are better with Tylenol and Rest.  Symptoms are worse with: prolonged walking, donning socks, rolling over in bed.  Other evaluation and/or treatments so far consists of: Tylenol and physical therapy.  Recent imaging completed: X-rays completed 20.  Prior History of related problems: chronic hip pain.    Patient has started COVID - 19 vaccination series.  Scheduled for second injection on 21.    Social History: retired - works part-time doing sales    Interim History - May 14, 2021  Since last visit on 3/12/21 patient has moderate - severe left hip pain.  Left hip intra-articular injection completed on 3/12/21 provided ~ 75% relief for ~ 10 days.  Patient reports progressively worsening pain since that time.  He would like repeat intra-articular injection today.  No new injury in the interim.    Interim History - 2021  Since last visit on 21 patient has moderate left deep anterior hip and groin pain over the past 1 - 2 weeks.  Left hip intra-articular injection completed 21 provided good relief for ~ 3 - 4 monts.  He consulted with Dr Marquez in 2021 - electing to hold on total hip arthroplasty at this time.  No new  "injury in the interim.      Review of Systems  Musculoskeletal: as above  Remainder of review of systems is negative including constitutional, CV, pulmonary, GI, Skin and Neurologic except as noted in HPI or medical history.    Past Medical History:   Diagnosis Date     Actinic keratoses     sees derm q 6 mo's for this     AR (allergic rhinitis)      Osteoarthritis of both hips      Skin cancer     multiple -- forehead, right ear, etc.     Past Surgical History:   Procedure Laterality Date     C UNLISTED VASCULAR ENDOSCOPY PROC      VAS REVERSAL     right ear skin CA removal and reconstruction  2012    had a skin graft from right side of neck, too     TONSILLECTOMY       VASECTOMY      VAS,REVERSAL,VAS     Family History   Problem Relation Age of Onset     Hypertension Mother          age 94 of CVA     Hypertension Father      Cancer Father      Prostate Cancer Father         in mid 70's,  age 84 of pneumonia     Alzheimer Disease Father      Cancer Son        Objective  /79   Ht 1.79 m (5' 10.47\")   Wt 69.9 kg (154 lb)   BMI 21.80 kg/m        General: healthy, alert and in no distress      HEENT: no scleral icterus or conjunctival erythema     Skin: no suspicious lesions or rash. No jaundice.     CV: regular rhythm by palpation, 2+ distal pulses, no pedal edema      Resp: normal respiratory effort without conversational dyspnea     Psych: normal mood and affect      Gait: nonantalgic, appropriate coordination and balance     Neuro: normal light touch sensory exam of the extremities. Motor strength as noted below       Left hip exam - similar to previous    Inspection:        no edema or ecchymosis in hip area    ROM:       Flexion 100        internal rotation 10      external rotation 30      Pain with terminal passive flexion, IR, abduction    Strength:       At baseline    Sensation:        grossly intact in hip and thigh    Skin:       well perfused       capillary refill " brisk    Special Tests:        positive (+) FADIR    Radiology  Results for orders placed or performed in visit on 11/02/20   XR Hip Left 2-3 Views    Narrative    Examination:  XR HIP LEFT 2-3 VIEWS    Date:  11/2/2020 3:32 PM     Clinical Information: Chronic left hip pain. History of a fall.    Additional Information: none    Comparison: 7/13/2014.      Impression    Impression:    1. Severe left hip arthrosis, with chronic joint space narrowing and  marginal osteophytes. This is moderately progressed from 2014.  Maintained and normal hip joint alignment. No fracture deformity. No  AVN or bone lesion.    CHRISTINA RENTERIA MD     Large Joint Injection/Arthocentesis: L hip joint    Date/Time: 9/17/2021 8:28 AM  Performed by: Doug Mattson DO  Authorized by: Doug Mattson DO     Indications:  Pain, diagnostic evaluation and osteoarthritis  Needle Size:  22 G  Guidance: ultrasound    Approach:  Anterior  Location:  Hip      Site:  L hip joint  Medications:  3 mL ropivacaine 5 MG/ML; 40 mg triamcinolone 40 MG/ML  Outcome:  Tolerated well, no immediate complications  Procedure discussed: discussed risks, benefits, and alternatives    Consent Given by:  Patient  Timeout: timeout called immediately prior to procedure    Prep: patient was prepped and draped in usual sterile fashion     4 ml's of 1% lidocaine was used as local anesthetic prior to injection        Assessment:  1. Primary osteoarthritis of left hip    2. Chronic left hip pain        Plan:  Discussed the assessment with the patient.  Follow up: prn based on clinical progress  Clear hip joint pain by hx, exam, and advanced DJD present on XR  XR images independently visualized and reviewed with patient again today in clinic  Reviewed safe low impact activity strategies  PT available in the future prn  Add'l tx options will be based on efficacy and duration of relief from CSI  Repeat CSI today   Orthopedic surgery referral placed for SONIA  discussion, not pursuing for now  Expectations and goals of CSI reviewed  Often 2-3 days for steroid effect, and can take up to two weeks for maximum effect  We discussed modified progressive pain-free activity as tolerated  Do not overuse in first two weeks if feeling better due to concern for vulnerability while steroid is working  Supportive care reviewed  All questions were answered today  Contact us with additional questions or concerns  Signs and sx of concern reviewed      Doug Mattson DO, CAQ  Primary Care Sports Medicine  [facility]             Disclaimer: This note consists of symbols derived from keyboarding, dictation and/or voice recognition software. As a result, there may be errors in the script that have gone undetected. Please consider this when interpreting information found in this chart.      Again, thank you for allowing me to participate in the care of your patient.        Sincerely,        Doug Mattson DO

## 2021-09-17 NOTE — PROGRESS NOTES
Trent Estrada  :  1950  DOS:21  MRN: [id number]    Sports Medicine Clinic Visit    PCP: Yonis Martin    Trent Estrada is a 70 year old male who is seen in consultation at the request of  Yonis Martin M.D. presenting with chronic left hip pain.    Injury: Gradual onset of waxing & waning left hip pain with activity over the past ~ 6+ years.  Pain located over left deep anterior hip, groin, nonradiating.  Additional Features:  Positive: grinding and weakness.  Symptoms are better with Tylenol and Rest.  Symptoms are worse with: prolonged walking, donning socks, rolling over in bed.  Other evaluation and/or treatments so far consists of: Tylenol and physical therapy.  Recent imaging completed: X-rays completed 20.  Prior History of related problems: chronic hip pain.    Patient has started COVID - 19 vaccination series.  Scheduled for second injection on 21.    Social History: retired - works part-time doing sales    Interim History - May 14, 2021  Since last visit on 3/12/21 patient has moderate - severe left hip pain.  Left hip intra-articular injection completed on 3/12/21 provided ~ 75% relief for ~ 10 days.  Patient reports progressively worsening pain since that time.  He would like repeat intra-articular injection today.  No new injury in the interim.    Interim History - 2021  Since last visit on 21 patient has moderate left deep anterior hip and groin pain over the past 1 - 2 weeks.  Left hip intra-articular injection completed 21 provided good relief for ~ 3 - 4 monts.  He consulted with Dr Marquez in 2021 - electing to hold on total hip arthroplasty at this time.  No new injury in the interim.      Review of Systems  Musculoskeletal: as above  Remainder of review of systems is negative including constitutional, CV, pulmonary, GI, Skin and Neurologic except as noted in HPI or medical history.    Past Medical History:   Diagnosis Date      "Actinic keratoses     sees derm q 6 mo's for this     AR (allergic rhinitis)      Osteoarthritis of both hips      Skin cancer     multiple -- forehead, right ear, etc.     Past Surgical History:   Procedure Laterality Date     C UNLISTED VASCULAR ENDOSCOPY PROC      VAS REVERSAL     right ear skin CA removal and reconstruction  2012    had a skin graft from right side of neck, too     TONSILLECTOMY       VASECTOMY      VAS,REVERSAL,VAS     Family History   Problem Relation Age of Onset     Hypertension Mother          age 94 of CVA     Hypertension Father      Cancer Father      Prostate Cancer Father         in mid 70's,  age 84 of pneumonia     Alzheimer Disease Father      Cancer Son        Objective  /79   Ht 1.79 m (5' 10.47\")   Wt 69.9 kg (154 lb)   BMI 21.80 kg/m        General: healthy, alert and in no distress      HEENT: no scleral icterus or conjunctival erythema     Skin: no suspicious lesions or rash. No jaundice.     CV: regular rhythm by palpation, 2+ distal pulses, no pedal edema      Resp: normal respiratory effort without conversational dyspnea     Psych: normal mood and affect      Gait: nonantalgic, appropriate coordination and balance     Neuro: normal light touch sensory exam of the extremities. Motor strength as noted below       Left hip exam - similar to previous    Inspection:        no edema or ecchymosis in hip area    ROM:       Flexion 100        internal rotation 10      external rotation 30      Pain with terminal passive flexion, IR, abduction    Strength:       At baseline    Sensation:        grossly intact in hip and thigh    Skin:       well perfused       capillary refill brisk    Special Tests:        positive (+) FADIR    Radiology  Results for orders placed or performed in visit on 20   XR Hip Left 2-3 Views    Narrative    Examination:  XR HIP LEFT 2-3 VIEWS    Date:  2020 3:32 PM     Clinical Information: Chronic left hip pain. History " of a fall.    Additional Information: none    Comparison: 7/13/2014.      Impression    Impression:    1. Severe left hip arthrosis, with chronic joint space narrowing and  marginal osteophytes. This is moderately progressed from 2014.  Maintained and normal hip joint alignment. No fracture deformity. No  AVN or bone lesion.    CHRISTINA RENTERIA MD     Large Joint Injection/Arthocentesis: L hip joint    Date/Time: 9/17/2021 8:28 AM  Performed by: Doug Mattson DO  Authorized by: Doug Mattson DO     Indications:  Pain, diagnostic evaluation and osteoarthritis  Needle Size:  22 G  Guidance: ultrasound    Approach:  Anterior  Location:  Hip      Site:  L hip joint  Medications:  3 mL ropivacaine 5 MG/ML; 40 mg triamcinolone 40 MG/ML  Outcome:  Tolerated well, no immediate complications  Procedure discussed: discussed risks, benefits, and alternatives    Consent Given by:  Patient  Timeout: timeout called immediately prior to procedure    Prep: patient was prepped and draped in usual sterile fashion     4 ml's of 1% lidocaine was used as local anesthetic prior to injection        Assessment:  1. Primary osteoarthritis of left hip    2. Chronic left hip pain        Plan:  Discussed the assessment with the patient.  Follow up: prn based on clinical progress  Clear hip joint pain by hx, exam, and advanced DJD present on XR  XR images independently visualized and reviewed with patient again today in clinic  Reviewed safe low impact activity strategies  PT available in the future prn  Add'l tx options will be based on efficacy and duration of relief from CSI  Repeat CSI today   Orthopedic surgery referral placed for SONIA discussion, not pursuing for now  Expectations and goals of CSI reviewed  Often 2-3 days for steroid effect, and can take up to two weeks for maximum effect  We discussed modified progressive pain-free activity as tolerated  Do not overuse in first two weeks if feeling better due to  concern for vulnerability while steroid is working  Supportive care reviewed  All questions were answered today  Contact us with additional questions or concerns  Signs and sx of concern reviewed      Doug Mattson DO, CAJAVIER  Primary Care Sports Medicine  [facility]             Disclaimer: This note consists of symbols derived from keyboarding, dictation and/or voice recognition software. As a result, there may be errors in the script that have gone undetected. Please consider this when interpreting information found in this chart.

## 2021-10-23 ENCOUNTER — HEALTH MAINTENANCE LETTER (OUTPATIENT)
Age: 71
End: 2021-10-23

## 2021-10-26 ENCOUNTER — DOCUMENTATION ONLY (OUTPATIENT)
Dept: OTHER | Facility: CLINIC | Age: 71
End: 2021-10-26

## 2021-11-11 NOTE — PROGRESS NOTES
Trent Estrada  :  1950  DOS:21  MRN: [id number]    Sports Medicine Clinic Visit    PCP: Yonis Martin    Trent Estrada is a 70 year old male who is seen in consultation at the request of  Yonis Martin M.D. presenting with chronic left hip pain.    Injury: Gradual onset of waxing & waning left hip pain with activity over the past ~ 6+ years.  Pain located over left deep anterior hip, groin, nonradiating.  Additional Features:  Positive: grinding and weakness.  Symptoms are better with Tylenol and Rest.  Symptoms are worse with: prolonged walking, donning socks, rolling over in bed.  Other evaluation and/or treatments so far consists of: Tylenol and physical therapy.  Recent imaging completed: X-rays completed 20.  Prior History of related problems: chronic hip pain.    Patient has started COVID - 19 vaccination series.  Scheduled for second injection on 21.    Social History: retired - works part-time doing sales    Interim History - May 14, 2021  Since last visit on 3/12/21 patient has moderate - severe left hip pain.  Left hip intra-articular injection completed on 3/12/21 provided ~ 75% relief for ~ 10 days.  Patient reports progressively worsening pain since that time.  He would like repeat intra-articular injection today.  No new injury in the interim.    Interim History - 2021  Since last visit on 21 patient has moderate left deep anterior hip and groin pain over the past 1 - 2 weeks.  Left hip intra-articular injection completed 21 provided good relief for ~ 3 - 4 monts.  He consulted with Dr Marquez in 2021 - electing to hold on total hip arthroplasty at this time.  No new injury in the interim.    Interim History - 2021  Since last visit on 2021 patient has continued anterior left hip pain.  Left hip intra-articular injection completed on 21 provided mild relief so far, similar to first CSI last time.  No new injury in  "the interim.      Review of Systems  Musculoskeletal: as above  Remainder of review of systems is negative including constitutional, CV, pulmonary, GI, Skin and Neurologic except as noted in HPI or medical history.    Past Medical History:   Diagnosis Date     Actinic keratoses     sees derm q 6 mo's for this     AR (allergic rhinitis)      Osteoarthritis of both hips      Skin cancer     multiple -- forehead, right ear, etc.     Past Surgical History:   Procedure Laterality Date     C UNLISTED VASCULAR ENDOSCOPY PROC      VAS REVERSAL     right ear skin CA removal and reconstruction  2012    had a skin graft from right side of neck, too     TONSILLECTOMY       VASECTOMY      VAS,REVERSAL,VAS     Family History   Problem Relation Age of Onset     Hypertension Mother          age 94 of CVA     Hypertension Father      Cancer Father      Prostate Cancer Father         in mid 70's,  age 84 of pneumonia     Alzheimer Disease Father      Cancer Son        Objective  /74   Ht 1.803 m (5' 11\")   Wt 69.4 kg (153 lb)   BMI 21.34 kg/m        General: healthy, alert and in no distress      HEENT: no scleral icterus or conjunctival erythema     Skin: no suspicious lesions or rash. No jaundice.     CV: regular rhythm by palpation, 2+ distal pulses, no pedal edema      Resp: normal respiratory effort without conversational dyspnea     Psych: normal mood and affect      Gait: nonantalgic, appropriate coordination and balance     Neuro: normal light touch sensory exam of the extremities. Motor strength as noted below       Left hip exam - similar to previous    Inspection:        no edema or ecchymosis in hip area    ROM:       Flexion 100        internal rotation 10      external rotation 30      Pain with terminal passive flexion, IR, abduction    Strength:       At baseline    Sensation:        grossly intact in hip and thigh    Skin:       well perfused       capillary refill brisk    Special Tests:    "     positive (+) FADIR    Radiology  Results for orders placed or performed in visit on 11/02/20   XR Hip Left 2-3 Views    Narrative    Examination:  XR HIP LEFT 2-3 VIEWS    Date:  11/2/2020 3:32 PM     Clinical Information: Chronic left hip pain. History of a fall.    Additional Information: none    Comparison: 7/13/2014.      Impression    Impression:    1. Severe left hip arthrosis, with chronic joint space narrowing and  marginal osteophytes. This is moderately progressed from 2014.  Maintained and normal hip joint alignment. No fracture deformity. No  AVN or bone lesion.    CHRISTINA RENTERIA MD     Large Joint Injection/Arthocentesis: L hip joint    Date/Time: 11/13/2021 8:39 AM  Performed by: Doug Mattson DO  Authorized by: Doug Mattson DO     Indications:  Pain and osteoarthritis  Needle Size:  22 G  Guidance: ultrasound    Approach:  Anterior  Location:  Hip      Site:  L hip joint  Medications:  3 mL ropivacaine 5 MG/ML; 40 mg triamcinolone 40 MG/ML; 4 mL lidocaine 1 %  Outcome:  Tolerated well, no immediate complications  Procedure discussed: discussed risks, benefits, and alternatives    Consent Given by:  Patient  Prep: patient was prepped and draped in usual sterile fashion     4 ml's of 1% lidocaine was used as local anesthetic prior to injection        Assessment:  1. Chronic left hip pain    2. Primary osteoarthritis of left hip        Plan:  Discussed the assessment with the patient.  Follow up: prn based on clinical progress  Clear hip joint pain by hx, exam, and advanced DJD present on XR  XR images independently visualized and reviewed with patient again today in clinic  Reviewed safe low impact activity strategies  PT available in the future prn  Add'l tx options will be based on efficacy and duration of relief from CSI  Repeat CSI today , last round he had significant relief after 2nd injection, will trial this strategy again today, reviewed relative risks and  goals  Orthopedic surgery referral placed for SONIA discussion, not pursuing for now  Expectations and goals of CSI reviewed  Often 2-3 days for steroid effect, and can take up to two weeks for maximum effect  We discussed modified progressive pain-free activity as tolerated  Do not overuse in first two weeks if feeling better due to concern for vulnerability while steroid is working  Supportive care reviewed  All questions were answered today  Contact us with additional questions or concerns  Signs and sx of concern reviewed      Doug Mattson DO, CAJAVIER  Primary Care Sports Medicine  [facility]             Disclaimer: This note consists of symbols derived from keyboarding, dictation and/or voice recognition software. As a result, there may be errors in the script that have gone undetected. Please consider this when interpreting information found in this chart.

## 2021-11-13 ENCOUNTER — OFFICE VISIT (OUTPATIENT)
Dept: ORTHOPEDICS | Facility: CLINIC | Age: 71
End: 2021-11-13
Payer: COMMERCIAL

## 2021-11-13 VITALS
BODY MASS INDEX: 21.42 KG/M2 | HEIGHT: 71 IN | SYSTOLIC BLOOD PRESSURE: 132 MMHG | DIASTOLIC BLOOD PRESSURE: 74 MMHG | WEIGHT: 153 LBS

## 2021-11-13 DIAGNOSIS — M16.12 PRIMARY OSTEOARTHRITIS OF LEFT HIP: ICD-10-CM

## 2021-11-13 DIAGNOSIS — M25.552 CHRONIC LEFT HIP PAIN: Primary | ICD-10-CM

## 2021-11-13 DIAGNOSIS — G89.29 CHRONIC LEFT HIP PAIN: Primary | ICD-10-CM

## 2021-11-13 PROCEDURE — 99213 OFFICE O/P EST LOW 20 MIN: CPT | Mod: 25 | Performed by: FAMILY MEDICINE

## 2021-11-13 PROCEDURE — 20611 DRAIN/INJ JOINT/BURSA W/US: CPT | Mod: LT | Performed by: FAMILY MEDICINE

## 2021-11-13 RX ORDER — TRIAMCINOLONE ACETONIDE 40 MG/ML
40 INJECTION, SUSPENSION INTRA-ARTICULAR; INTRAMUSCULAR
Status: SHIPPED | OUTPATIENT
Start: 2021-11-13

## 2021-11-13 RX ORDER — LIDOCAINE HYDROCHLORIDE 10 MG/ML
4 INJECTION, SOLUTION INFILTRATION; PERINEURAL
Status: SHIPPED | OUTPATIENT
Start: 2021-11-13

## 2021-11-13 RX ORDER — ROPIVACAINE HYDROCHLORIDE 5 MG/ML
3 INJECTION, SOLUTION EPIDURAL; INFILTRATION; PERINEURAL
Status: SHIPPED | OUTPATIENT
Start: 2021-11-13

## 2021-11-13 RX ADMIN — ROPIVACAINE HYDROCHLORIDE 3 ML: 5 INJECTION, SOLUTION EPIDURAL; INFILTRATION; PERINEURAL at 08:39

## 2021-11-13 RX ADMIN — LIDOCAINE HYDROCHLORIDE 4 ML: 10 INJECTION, SOLUTION INFILTRATION; PERINEURAL at 08:39

## 2021-11-13 RX ADMIN — TRIAMCINOLONE ACETONIDE 40 MG: 40 INJECTION, SUSPENSION INTRA-ARTICULAR; INTRAMUSCULAR at 08:39

## 2021-11-13 ASSESSMENT — MIFFLIN-ST. JEOR: SCORE: 1476.13

## 2021-11-13 NOTE — LETTER
2021         RE: Trent Estrada  [address] [id number]        Dear Colleague,    Thank you for referring your patient, Trent Estrada, to the [facility]. Please see a copy of my visit note below.    Trent Estrada  :  1950  DOS:21  MRN: [id number]    Sports Medicine Clinic Visit    PCP: Yonis Martin    Trent Estrada is a 70 year old male who is seen in consultation at the request of  Yonis Martin M.D. presenting with chronic left hip pain.    Injury: Gradual onset of waxing & waning left hip pain with activity over the past ~ 6+ years.  Pain located over left deep anterior hip, groin, nonradiating.  Additional Features:  Positive: grinding and weakness.  Symptoms are better with Tylenol and Rest.  Symptoms are worse with: prolonged walking, donning socks, rolling over in bed.  Other evaluation and/or treatments so far consists of: Tylenol and physical therapy.  Recent imaging completed: X-rays completed 20.  Prior History of related problems: chronic hip pain.    Patient has started COVID - 19 vaccination series.  Scheduled for second injection on 21.    Social History: retired - works part-time doing sales    Interim History - May 14, 2021  Since last visit on 3/12/21 patient has moderate - severe left hip pain.  Left hip intra-articular injection completed on 3/12/21 provided ~ 75% relief for ~ 10 days.  Patient reports progressively worsening pain since that time.  He would like repeat intra-articular injection today.  No new injury in the interim.    Interim History - 2021  Since last visit on 21 patient has moderate left deep anterior hip and groin pain over the past 1 - 2 weeks.  Left hip intra-articular injection completed 21 provided good relief for ~ 3 - 4 monts.  He consulted with Dr Marquez in 2021 - electing to hold on total hip arthroplasty at this time.  No new  "injury in the interim.    Interim History - 2021  Since last visit on 2021 patient has continued anterior left hip pain.  Left hip intra-articular injection completed on 21 provided mild relief so far, similar to first CSI last time.  No new injury in the interim.      Review of Systems  Musculoskeletal: as above  Remainder of review of systems is negative including constitutional, CV, pulmonary, GI, Skin and Neurologic except as noted in HPI or medical history.    Past Medical History:   Diagnosis Date     Actinic keratoses     sees derm q 6 mo's for this     AR (allergic rhinitis)      Osteoarthritis of both hips      Skin cancer     multiple -- forehead, right ear, etc.     Past Surgical History:   Procedure Laterality Date     C UNLISTED VASCULAR ENDOSCOPY PROC      VAS REVERSAL     right ear skin CA removal and reconstruction  2012    had a skin graft from right side of neck, too     TONSILLECTOMY       VASECTOMY      VAS,REVERSAL,VAS     Family History   Problem Relation Age of Onset     Hypertension Mother          age 94 of CVA     Hypertension Father      Cancer Father      Prostate Cancer Father         in mid 70's,  age 84 of pneumonia     Alzheimer Disease Father      Cancer Son        Objective  /74   Ht 1.803 m (5' 11\")   Wt 69.4 kg (153 lb)   BMI 21.34 kg/m        General: healthy, alert and in no distress      HEENT: no scleral icterus or conjunctival erythema     Skin: no suspicious lesions or rash. No jaundice.     CV: regular rhythm by palpation, 2+ distal pulses, no pedal edema      Resp: normal respiratory effort without conversational dyspnea     Psych: normal mood and affect      Gait: nonantalgic, appropriate coordination and balance     Neuro: normal light touch sensory exam of the extremities. Motor strength as noted below       Left hip exam - similar to previous    Inspection:        no edema or ecchymosis in hip area    ROM:       Flexion " 100        internal rotation 10      external rotation 30      Pain with terminal passive flexion, IR, abduction    Strength:       At baseline    Sensation:        grossly intact in hip and thigh    Skin:       well perfused       capillary refill brisk    Special Tests:        positive (+) FADIR    Radiology  Results for orders placed or performed in visit on 11/02/20   XR Hip Left 2-3 Views    Narrative    Examination:  XR HIP LEFT 2-3 VIEWS    Date:  11/2/2020 3:32 PM     Clinical Information: Chronic left hip pain. History of a fall.    Additional Information: none    Comparison: 7/13/2014.      Impression    Impression:    1. Severe left hip arthrosis, with chronic joint space narrowing and  marginal osteophytes. This is moderately progressed from 2014.  Maintained and normal hip joint alignment. No fracture deformity. No  AVN or bone lesion.    CHRISTINA RENTERIA MD     Large Joint Injection/Arthocentesis: L hip joint    Date/Time: 11/13/2021 8:39 AM  Performed by: Doug Mattson DO  Authorized by: Doug Mattson DO     Indications:  Pain and osteoarthritis  Needle Size:  22 G  Guidance: ultrasound    Approach:  Anterior  Location:  Hip      Site:  L hip joint  Medications:  3 mL ropivacaine 5 MG/ML; 40 mg triamcinolone 40 MG/ML; 4 mL lidocaine 1 %  Outcome:  Tolerated well, no immediate complications  Procedure discussed: discussed risks, benefits, and alternatives    Consent Given by:  Patient  Prep: patient was prepped and draped in usual sterile fashion     4 ml's of 1% lidocaine was used as local anesthetic prior to injection        Assessment:  1. Chronic left hip pain    2. Primary osteoarthritis of left hip        Plan:  Discussed the assessment with the patient.  Follow up: prn based on clinical progress  Clear hip joint pain by hx, exam, and advanced DJD present on XR  XR images independently visualized and reviewed with patient again today in clinic  Reviewed safe low impact  activity strategies  PT available in the future prn  Add'l tx options will be based on efficacy and duration of relief from CSI  Repeat CSI today , last round he had significant relief after 2nd injection, will trial this strategy again today, reviewed relative risks and goals  Orthopedic surgery referral placed for SONIA discussion, not pursuing for now  Expectations and goals of CSI reviewed  Often 2-3 days for steroid effect, and can take up to two weeks for maximum effect  We discussed modified progressive pain-free activity as tolerated  Do not overuse in first two weeks if feeling better due to concern for vulnerability while steroid is working  Supportive care reviewed  All questions were answered today  Contact us with additional questions or concerns  Signs and sx of concern reviewed      Doug Mattson DO, VIVIAN  Primary Care Sports Medicine  [facility]             Disclaimer: This note consists of symbols derived from keyboarding, dictation and/or voice recognition software. As a result, there may be errors in the script that have gone undetected. Please consider this when interpreting information found in this chart.      Again, thank you for allowing me to participate in the care of your patient.        Sincerely,        Doug Mattson DO

## 2022-05-17 NOTE — PROGRESS NOTES
Trent Estrada  :  1950  DOS:2022  MRN: [id number]    Sports Medicine Clinic Visit    PCP: Yonis Martin    Trent Estrada is a 70 year old male who is seen in consultation at the request of  Yonis Martin M.D. presenting with chronic left hip pain.    Injury: Gradual onset of waxing & waning left hip pain with activity over the past ~ 6+ years.  Pain located over left deep anterior hip, groin, nonradiating.  Additional Features:  Positive: grinding and weakness.  Symptoms are better with Tylenol and Rest.  Symptoms are worse with: prolonged walking, donning socks, rolling over in bed.  Other evaluation and/or treatments so far consists of: Tylenol and physical therapy.  Recent imaging completed: X-rays completed 20.  Prior History of related problems: chronic hip pain.    Patient has started COVID - 19 vaccination series.  Scheduled for second injection on 21.    Social History: retired - works part-time doing sales    Interim History - May 14, 2021  Since last visit on 3/12/21 patient has moderate - severe left hip pain.  Left hip intra-articular injection completed on 3/12/21 provided ~ 75% relief for ~ 10 days.  Patient reports progressively worsening pain since that time.  He would like repeat intra-articular injection today.  No new injury in the interim.    Interim History - 2021  Since last visit on 21 patient has moderate left deep anterior hip and groin pain over the past 1 - 2 weeks.  Left hip intra-articular injection completed 21 provided good relief for ~ 3 - 4 monts.  He consulted with Dr Marquez in 2021 - electing to hold on total hip arthroplasty at this time.  No new injury in the interim.    Interim History - 2021  Since last visit on 2021 patient has continued anterior left hip pain.  Left hip intra-articular injection completed on 21 provided mild relief so far, similar to first CSI last time.  No new injury  in the interim.    Interim History - May 20, 2022  Since last visit on 2021, patient has noticed increased left anterior hip pain without radiation over the past 2 months. Left hip intra-articular injection completed on 2021 allowed for moderate relief for 4 months. Pain is good in the morning but pain is high come around 3 pm. Takes Aleve prn. Hopeful for a repeat corticosteroid injection. No interim injury.       Review of Systems  Musculoskeletal: as above  Remainder of review of systems is negative including constitutional, CV, pulmonary, GI, Skin and Neurologic except as noted in HPI or medical history.    Past Medical History:   Diagnosis Date     Actinic keratoses     sees derm q 6 mo's for this     AR (allergic rhinitis)      Osteoarthritis of both hips      Skin cancer     multiple -- forehead, right ear, etc.     Past Surgical History:   Procedure Laterality Date     right ear skin CA removal and reconstruction  2012    had a skin graft from right side of neck, too     TONSILLECTOMY       VASECTOMY      VAS,REVERSAL,VAS     ZZC UNLISTED VASCULAR ENDOSCOPY PROC      VAS REVERSAL     Family History   Problem Relation Age of Onset     Hypertension Mother          age 94 of CVA     Hypertension Father      Cancer Father      Prostate Cancer Father         in mid 70's,  age 84 of pneumonia     Alzheimer Disease Father      Cancer Son        Objective  /84   Wt 71.5 kg (157 lb 9.6 oz)   BMI 21.98 kg/m        General: healthy, alert and in no distress      HEENT: no scleral icterus or conjunctival erythema     Skin: no suspicious lesions or rash. No jaundice.     CV: regular rhythm by palpation, 2+ distal pulses, no pedal edema      Resp: normal respiratory effort without conversational dyspnea     Psych: normal mood and affect      Gait: nonantalgic, appropriate coordination and balance     Neuro: normal light touch sensory exam of the extremities. Motor strength as noted  below       Left hip exam - similar to previous    Inspection:        no edema or ecchymosis in hip area    ROM:       Flexion 100        internal rotation 10      external rotation 30      Pain with terminal passive flexion, IR, abduction    Strength:       At baseline    Sensation:        grossly intact in hip and thigh    Skin:       well perfused       capillary refill brisk    Special Tests:        positive (+) FADIR    Radiology  Results for orders placed or performed in visit on 11/02/20   XR Hip Left 2-3 Views    Narrative    Examination:  XR HIP LEFT 2-3 VIEWS    Date:  11/2/2020 3:32 PM     Clinical Information: Chronic left hip pain. History of a fall.    Additional Information: none    Comparison: 7/13/2014.      Impression    Impression:    1. Severe left hip arthrosis, with chronic joint space narrowing and  marginal osteophytes. This is moderately progressed from 2014.  Maintained and normal hip joint alignment. No fracture deformity. No  AVN or bone lesion.    CHRISTINA RENTERIA MD     Large Joint Injection/Arthocentesis: L hip joint    Date/Time: 5/18/2022 8:50 AM  Performed by: Doug Mattson DO  Authorized by: Doug Mattson DO     Indications:  Pain and diagnostic evaluation  Needle Size:  22 G  Guidance: ultrasound    Approach:  Anterior  Location:  Hip      Site:  L hip joint  Medications:  3 mL ropivacaine 5 MG/ML; 40 mg triamcinolone 40 MG/ML  Outcome:  Tolerated well, no immediate complications  Procedure discussed: discussed risks, benefits, and alternatives    Consent Given by:  Patient  Timeout: timeout called immediately prior to procedure    Prep: patient was prepped and draped in usual sterile fashion     4 ml's of 1% lidocaine was used as local anesthetic prior to injection    Ultrasound images of procedure were permanently stored.           Assessment:  1. Chronic left hip pain    2. Primary osteoarthritis of left hip        Plan:  Discussed the assessment with  the patient.  Follow up: prn based on clinical progress  Recurrent hip joint pain based on hx, exam, and advanced DJD present on XR  XR images independently visualized and reviewed with again today in clinic  Reviewed safe low impact activity strategies  PT available in the future prn  Add'l tx options will be based on efficacy and duration of relief from CSI  Repeat CSI today , US guided  Orthopedic surgery referral placed previosuly for SONIA discussion, not pursuing for now, may consider this fall/winter  Expectations and goals of CSI reviewed  Often 2-3 days for steroid effect, and can take up to two weeks for maximum effect  We discussed modified progressive pain-free activity as tolerated  Do not overuse in first two weeks if feeling better due to concern for vulnerability while steroid is working  Supportive care reviewed  All questions were answered today  Contact us with additional questions or concerns  Signs and sx of concern reviewed      Doug Mattson DO, VIVIAN  Primary Care Sports Medicine  [facility]             Disclaimer: This note consists of symbols derived from keyboarding, dictation and/or voice recognition software. As a result, there may be errors in the script that have gone undetected. Please consider this when interpreting information found in this chart.

## 2022-05-18 ENCOUNTER — OFFICE VISIT (OUTPATIENT)
Dept: ORTHOPEDICS | Facility: CLINIC | Age: 72
End: 2022-05-18
Payer: COMMERCIAL

## 2022-05-18 VITALS — SYSTOLIC BLOOD PRESSURE: 134 MMHG | DIASTOLIC BLOOD PRESSURE: 84 MMHG | BODY MASS INDEX: 21.98 KG/M2 | WEIGHT: 157.6 LBS

## 2022-05-18 DIAGNOSIS — M16.12 PRIMARY OSTEOARTHRITIS OF LEFT HIP: ICD-10-CM

## 2022-05-18 DIAGNOSIS — G89.29 CHRONIC LEFT HIP PAIN: Primary | ICD-10-CM

## 2022-05-18 DIAGNOSIS — M25.552 CHRONIC LEFT HIP PAIN: Primary | ICD-10-CM

## 2022-05-18 PROCEDURE — 99213 OFFICE O/P EST LOW 20 MIN: CPT | Mod: 25 | Performed by: FAMILY MEDICINE

## 2022-05-18 PROCEDURE — 20611 DRAIN/INJ JOINT/BURSA W/US: CPT | Mod: LT | Performed by: FAMILY MEDICINE

## 2022-05-18 RX ORDER — TRIAMCINOLONE ACETONIDE 40 MG/ML
40 INJECTION, SUSPENSION INTRA-ARTICULAR; INTRAMUSCULAR
Status: SHIPPED | OUTPATIENT
Start: 2022-05-18

## 2022-05-18 RX ORDER — ROPIVACAINE HYDROCHLORIDE 5 MG/ML
3 INJECTION, SOLUTION EPIDURAL; INFILTRATION; PERINEURAL
Status: SHIPPED | OUTPATIENT
Start: 2022-05-18

## 2022-05-18 RX ADMIN — ROPIVACAINE HYDROCHLORIDE 3 ML: 5 INJECTION, SOLUTION EPIDURAL; INFILTRATION; PERINEURAL at 08:50

## 2022-05-18 RX ADMIN — TRIAMCINOLONE ACETONIDE 40 MG: 40 INJECTION, SUSPENSION INTRA-ARTICULAR; INTRAMUSCULAR at 08:50

## 2022-05-18 ASSESSMENT — PAIN SCALES - GENERAL: PAINLEVEL: MODERATE PAIN (5)

## 2022-05-18 NOTE — LETTER
2022         RE: Trent Estrada  [address] [address]        Dear Colleague,    Thank you for referring your patient, Trent Estrada, to the [facility]. Please see a copy of my visit note below.    Trent Estrada  :  1950  DOS:2022  MRN: [id number]    Sports Medicine Clinic Visit    PCP: Yonis Martin    Trent Estrada is a 70 year old male who is seen in consultation at the request of  Yonis Martin M.D. presenting with chronic left hip pain.    Injury: Gradual onset of waxing & waning left hip pain with activity over the past ~ 6+ years.  Pain located over left deep anterior hip, groin, nonradiating.  Additional Features:  Positive: grinding and weakness.  Symptoms are better with Tylenol and Rest.  Symptoms are worse with: prolonged walking, donning socks, rolling over in bed.  Other evaluation and/or treatments so far consists of: Tylenol and physical therapy.  Recent imaging completed: X-rays completed 20.  Prior History of related problems: chronic hip pain.    Patient has started COVID - 19 vaccination series.  Scheduled for second injection on 21.    Social History: retired - works part-time doing sales    Interim History - May 14, 2021  Since last visit on 3/12/21 patient has moderate - severe left hip pain.  Left hip intra-articular injection completed on 3/12/21 provided ~ 75% relief for ~ 10 days.  Patient reports progressively worsening pain since that time.  He would like repeat intra-articular injection today.  No new injury in the interim.    Interim History - 2021  Since last visit on 21 patient has moderate left deep anterior hip and groin pain over the past 1 - 2 weeks.  Left hip intra-articular injection completed 21 provided good relief for ~ 3 - 4 monts.  He consulted with Dr Marquez in 2021 - electing to hold on total hip arthroplasty at this time.  No  new injury in the interim.    Interim History - 2021  Since last visit on 2021 patient has continued anterior left hip pain.  Left hip intra-articular injection completed on 21 provided mild relief so far, similar to first CSI last time.  No new injury in the interim.    Interim History - May 20, 2022  Since last visit on 2021, patient has noticed increased left anterior hip pain without radiation over the past 2 months. Left hip intra-articular injection completed on 2021 allowed for moderate relief for 4 months. Pain is good in the morning but pain is high come around 3 pm. Takes Aleve prn. Hopeful for a repeat corticosteroid injection. No interim injury.       Review of Systems  Musculoskeletal: as above  Remainder of review of systems is negative including constitutional, CV, pulmonary, GI, Skin and Neurologic except as noted in HPI or medical history.    Past Medical History:   Diagnosis Date     Actinic keratoses     sees derm q 6 mo's for this     AR (allergic rhinitis)      Osteoarthritis of both hips      Skin cancer     multiple -- forehead, right ear, etc.     Past Surgical History:   Procedure Laterality Date     right ear skin CA removal and reconstruction  2012    had a skin graft from right side of neck, too     TONSILLECTOMY       VASECTOMY      VAS,REVERSAL,VAS     ZZC UNLISTED VASCULAR ENDOSCOPY PROC      VAS REVERSAL     Family History   Problem Relation Age of Onset     Hypertension Mother          age 94 of CVA     Hypertension Father      Cancer Father      Prostate Cancer Father         in mid 70's,  age 84 of pneumonia     Alzheimer Disease Father      Cancer Son        Objective  /84   Wt 71.5 kg (157 lb 9.6 oz)   BMI 21.98 kg/m        General: healthy, alert and in no distress      HEENT: no scleral icterus or conjunctival erythema     Skin: no suspicious lesions or rash. No jaundice.     CV: regular rhythm by palpation, 2+ distal  pulses, no pedal edema      Resp: normal respiratory effort without conversational dyspnea     Psych: normal mood and affect      Gait: nonantalgic, appropriate coordination and balance     Neuro: normal light touch sensory exam of the extremities. Motor strength as noted below       Left hip exam - similar to previous    Inspection:        no edema or ecchymosis in hip area    ROM:       Flexion 100        internal rotation 10      external rotation 30      Pain with terminal passive flexion, IR, abduction    Strength:       At baseline    Sensation:        grossly intact in hip and thigh    Skin:       well perfused       capillary refill brisk    Special Tests:        positive (+) FADIR    Radiology  Results for orders placed or performed in visit on 11/02/20   XR Hip Left 2-3 Views    Narrative    Examination:  XR HIP LEFT 2-3 VIEWS    Date:  11/2/2020 3:32 PM     Clinical Information: Chronic left hip pain. History of a fall.    Additional Information: none    Comparison: 7/13/2014.      Impression    Impression:    1. Severe left hip arthrosis, with chronic joint space narrowing and  marginal osteophytes. This is moderately progressed from 2014.  Maintained and normal hip joint alignment. No fracture deformity. No  AVN or bone lesion.    CHRISTINA RENTERIA MD     Large Joint Injection/Arthocentesis: L hip joint    Date/Time: 5/18/2022 8:50 AM  Performed by: Doug Mattson DO  Authorized by: Doug Mattson DO     Indications:  Pain and diagnostic evaluation  Needle Size:  22 G  Guidance: ultrasound    Approach:  Anterior  Location:  Hip      Site:  L hip joint  Medications:  3 mL ropivacaine 5 MG/ML; 40 mg triamcinolone 40 MG/ML  Outcome:  Tolerated well, no immediate complications  Procedure discussed: discussed risks, benefits, and alternatives    Consent Given by:  Patient  Timeout: timeout called immediately prior to procedure    Prep: patient was prepped and draped in usual sterile  fashion     4 ml's of 1% lidocaine was used as local anesthetic prior to injection    Ultrasound images of procedure were permanently stored.           Assessment:  1. Chronic left hip pain    2. Primary osteoarthritis of left hip        Plan:  Discussed the assessment with the patient.  Follow up: prn based on clinical progress  Recurrent hip joint pain based on hx, exam, and advanced DJD present on XR  XR images independently visualized and reviewed with again today in clinic  Reviewed safe low impact activity strategies  PT available in the future prn  Add'l tx options will be based on efficacy and duration of relief from CSI  Repeat CSI today , US guided  Orthopedic surgery referral placed previosuly for SONIA discussion, not pursuing for now, may consider this fall/winter  Expectations and goals of CSI reviewed  Often 2-3 days for steroid effect, and can take up to two weeks for maximum effect  We discussed modified progressive pain-free activity as tolerated  Do not overuse in first two weeks if feeling better due to concern for vulnerability while steroid is working  Supportive care reviewed  All questions were answered today  Contact us with additional questions or concerns  Signs and sx of concern reviewed      Doug Mattson DO, CAQ  Primary Care Sports Medicine  [facility]             Disclaimer: This note consists of symbols derived from keyboarding, dictation and/or voice recognition software. As a result, there may be errors in the script that have gone undetected. Please consider this when interpreting information found in this chart.      Again, thank you for allowing me to participate in the care of your patient.        Sincerely,        Doug Mattson DO

## 2022-06-23 ENCOUNTER — MYC MEDICAL ADVICE (OUTPATIENT)
Dept: FAMILY MEDICINE | Facility: CLINIC | Age: 72
End: 2022-06-23

## 2022-06-27 ENCOUNTER — TRANSFERRED RECORDS (OUTPATIENT)
Dept: HEALTH INFORMATION MANAGEMENT | Facility: CLINIC | Age: 72
End: 2022-06-27

## 2022-09-12 ENCOUNTER — MYC MEDICAL ADVICE (OUTPATIENT)
Dept: FAMILY MEDICINE | Facility: CLINIC | Age: 72
End: 2022-09-12

## 2022-10-04 ENCOUNTER — OFFICE VISIT (OUTPATIENT)
Dept: FAMILY MEDICINE | Facility: CLINIC | Age: 72
End: 2022-10-04
Payer: COMMERCIAL

## 2022-10-04 ENCOUNTER — TELEPHONE (OUTPATIENT)
Dept: FAMILY MEDICINE | Facility: CLINIC | Age: 72
End: 2022-10-04

## 2022-10-04 VITALS
HEART RATE: 82 BPM | SYSTOLIC BLOOD PRESSURE: 116 MMHG | TEMPERATURE: 97.8 F | HEIGHT: 70 IN | BODY MASS INDEX: 22.48 KG/M2 | WEIGHT: 157 LBS | OXYGEN SATURATION: 97 % | DIASTOLIC BLOOD PRESSURE: 72 MMHG

## 2022-10-04 DIAGNOSIS — Z01.818 PREOP GENERAL PHYSICAL EXAM: Primary | ICD-10-CM

## 2022-10-04 DIAGNOSIS — M16.12 PRIMARY OSTEOARTHRITIS OF LEFT HIP: ICD-10-CM

## 2022-10-04 DIAGNOSIS — L57.0 ACTINIC KERATOSES: ICD-10-CM

## 2022-10-04 DIAGNOSIS — Z85.828 HISTORY OF SKIN CANCER: ICD-10-CM

## 2022-10-04 LAB
ANION GAP SERPL CALCULATED.3IONS-SCNC: 1 MMOL/L (ref 3–14)
BUN SERPL-MCNC: 16 MG/DL (ref 7–30)
CALCIUM SERPL-MCNC: 9.4 MG/DL (ref 8.5–10.1)
CHLORIDE BLD-SCNC: 112 MMOL/L (ref 94–109)
CO2 SERPL-SCNC: 29 MMOL/L (ref 20–32)
CREAT SERPL-MCNC: 0.94 MG/DL (ref 0.66–1.25)
ERYTHROCYTE [DISTWIDTH] IN BLOOD BY AUTOMATED COUNT: 13.8 % (ref 10–15)
GFR SERPL CREATININE-BSD FRML MDRD: 87 ML/MIN/1.73M2
GLUCOSE BLD-MCNC: 133 MG/DL (ref 70–99)
HCT VFR BLD AUTO: 46.7 % (ref 40–53)
HGB BLD-MCNC: 16 G/DL (ref 13.3–17.7)
MCH RBC QN AUTO: 31.4 PG (ref 26.5–33)
MCHC RBC AUTO-ENTMCNC: 34.3 G/DL (ref 31.5–36.5)
MCV RBC AUTO: 92 FL (ref 78–100)
PLATELET # BLD AUTO: 206 10E3/UL (ref 150–450)
POTASSIUM BLD-SCNC: 3.8 MMOL/L (ref 3.4–5.3)
RBC # BLD AUTO: 5.09 10E6/UL (ref 4.4–5.9)
SODIUM SERPL-SCNC: 142 MMOL/L (ref 133–144)
WBC # BLD AUTO: 6.7 10E3/UL (ref 4–11)

## 2022-10-04 PROCEDURE — 93000 ELECTROCARDIOGRAM COMPLETE: CPT | Performed by: FAMILY MEDICINE

## 2022-10-04 PROCEDURE — 80048 BASIC METABOLIC PNL TOTAL CA: CPT | Performed by: FAMILY MEDICINE

## 2022-10-04 PROCEDURE — 36415 COLL VENOUS BLD VENIPUNCTURE: CPT | Performed by: FAMILY MEDICINE

## 2022-10-04 PROCEDURE — 85027 COMPLETE CBC AUTOMATED: CPT | Performed by: FAMILY MEDICINE

## 2022-10-04 PROCEDURE — 99215 OFFICE O/P EST HI 40 MIN: CPT | Performed by: FAMILY MEDICINE

## 2022-10-04 ASSESSMENT — PAIN SCALES - GENERAL: PAINLEVEL: MILD PAIN (3)

## 2022-10-04 NOTE — RESULT ENCOUNTER NOTE
Agustin,  These lab results look in good shape for your planned hip replacement surgery.  I will look forward to seeing you back in the office on December 1 for your routine annual checkup along with plans to complete fasting lab work at that time.    Yonis Martin MD

## 2022-10-04 NOTE — TELEPHONE ENCOUNTER
Patient Quality Outreach    Patient is due for the following:   Physical Annual Wellness Visit    Next Steps:   see below    Type of outreach:    Sent Team My Mobile message.    Next Steps:  Reach out within 90 days via patient recieved the [employer] message and scheduled his wellness exam for after Thanksgiving.    Max number of attempts reached: done. Will try again in 90 days if patient still on fail list.    Questions for provider review:    None     Luciana Michel [facility]  Chart routed to closing encounter.

## 2022-10-04 NOTE — PROGRESS NOTES
[facility]  [address] [place]  [place] [address]  Phone: [phone]  Primary Provider: Yonis Crespo  Pre-op Performing Provider: YONIS CRESPO      PREOPERATIVE EVALUATION:  Today's date: 10/4/2022    Trent Estrada is a 71 year old male who presents for a preoperative evaluation.    Surgical Information:  Surgery/Procedure: Total left hip replacement  Surgery Location: [facility]  Surgeon: Dr. Rankin  Surgery Date: 10/13/22  Time of Surgery: ?  Where patient plans to recover: At home with family  Fax number for surgical facility: [phone]    Type of Anesthesia Anticipated: to be determined    Assessment & Plan     The proposed surgical procedure is considered INTERMEDIATE risk.      ICD-10-CM    1. Preop general physical exam  Z01.818 Basic metabolic panel     CBC with platelets     EKG 12-lead complete w/read - Clinics   2. Primary osteoarthritis of left hip  M16.12    3. Actinic keratoses  L57.0    4. History of skin cancer  Z85.828      Risks and Recommendations:  The patient has the following additional risks and recommendations for perioperative complications:   - No identified additional risk factors other than previously addressed    Medication Instructions:  Patient is on no chronic medications   He was advised to avoid over-the-counter NSAIDs such as naproxen and ibuprofen for a week or so prior to his hip replacement.    RECOMMENDATION:  APPROVAL GIVEN to proceed with proposed procedure, without further diagnostic evaluation.      Subjective     HPI related to upcoming procedure: 71-year-old male with end-stage left hip arthritis has failed conservative treatment and is coming in now for a left hip replacement.    Preop Questions 10/2/2022   1. Have you ever had a heart attack or stroke? No   2. Have you ever had surgery on your heart or blood vessels, such as a stent placement, a coronary artery bypass, or surgery on an artery in your head, neck, heart,  or legs? No   3. Do you have chest pain with activity? No   4. Do you have a history of  heart failure? No   5. Do you currently have a cold, bronchitis or symptoms of other infection? No   6. Do you have a cough, shortness of breath, or wheezing? No   7. Do you or anyone in your family have previous history of blood clots? No   8. Do you or does anyone in your family have a serious bleeding problem such as prolonged bleeding following surgeries or cuts? No   9. Have you ever had problems with anemia or been told to take iron pills? No   10. Have you had any abnormal blood loss such as black, tarry or bloody stools? No   11. Have you ever had a blood transfusion? No   12. Are you willing to have a blood transfusion if it is medically needed before, during, or after your surgery? Yes   13. Have you or any of your relatives ever had problems with anesthesia? No   14. Do you have sleep apnea, excessive snoring or daytime drowsiness? No   15. Do you have any artifical heart valves or other implanted medical devices like a pacemaker, defibrillator, or continuous glucose monitor? No   16. Do you have artificial joints? No   17. Are you allergic to latex? No         Status of Chronic Conditions:  See problem list for active medical problems.  Problems all longstanding and stable, except as noted/documented.  See ROS for pertinent symptoms related to these conditions.      Review of Systems  CONSTITUTIONAL: NEGATIVE for fever, chills, change in weight  INTEGUMENTARY/SKIN: He sees dermatology every 4 to 6 months for history of skin cancer and actinic keratoses  EYES: NEGATIVE for vision changes or irritation  ENT/MOUTH: NEGATIVE for ear, mouth and throat problems  RESP: NEGATIVE for significant cough or SOB  CV: NEGATIVE for chest pain, palpitations or peripheral edema  GI: NEGATIVE for nausea, abdominal pain, heartburn, or change in bowel habits  : NEGATIVE for frequency, dysuria, or hematuria  MUSCULOSKELETAL:Left hip  "pain from osteoarthritis  NEURO: NEGATIVE for weakness, dizziness or paresthesias  ENDOCRINE: NEGATIVE for temperature intolerance, skin/hair changes  HEME: NEGATIVE for bleeding problems  PSYCHIATRIC: NEGATIVE for changes in mood or affect    Patient Active Problem List    Diagnosis Date Noted     Primary osteoarthritis of left hip      Priority: Medium     History of skin cancer 2013     Priority: Medium     Actinic keratoses      Priority: Medium     sees derm q 6 mo's for this        Past Medical History:   Diagnosis Date     Actinic keratoses     sees derm q 6 mo's for this     AR (allergic rhinitis)      Osteoarthritis of both hips      Skin cancer     multiple -- forehead, right ear, etc.     Past Surgical History:   Procedure Laterality Date     right ear skin CA removal and reconstruction  2012    had a skin graft from right side of neck, too     TONSILLECTOMY       VASECTOMY      VAS,REVERSAL,VAS     ZZC UNLISTED VASCULAR ENDOSCOPY PROC      VAS REVERSAL     Current Outpatient Medications   Medication Sig Dispense Refill     NO ACTIVE MEDICATIONS          No Known Allergies     Social History     Tobacco Use     Smoking status: Never Smoker     Smokeless tobacco: Never Used   Substance Use Topics     Alcohol use: No     Family History   Problem Relation Age of Onset     Hypertension Mother          age 94 of CVA     Hypertension Father      Cancer Father      Prostate Cancer Father         in mid 70's,  age 84 of pneumonia     Alzheimer Disease Father      Cancer Son      History   Drug Use No         Objective     /72 (BP Location: Right arm, Patient Position: Sitting, Cuff Size: Adult Regular)   Pulse 82   Temp 97.8  F (36.6  C) (Oral)   Ht 1.79 m (5' 10.47\")   Wt 71.2 kg (157 lb)   SpO2 97%   BMI 22.23 kg/m      Physical Exam    GENERAL APPEARANCE: healthy, alert and no distress     EYES: EOMI,  PERRL     HENT: Grossly normal     NECK: no adenopathy, no asymmetry, " masses, or scars and thyroid normal to palpation     RESP: lungs clear to auscultation - no rales, rhonchi or wheezes     CV: regular rates and rhythm, normal S1 S2, no S3 or S4 and no murmur, click or rub     ABDOMEN:  soft, nontender, no HSM or masses      MS: Some pain in the left hip with range of motion     SKIN: Numerous areas of actinic change on face and forehead     NEURO: Normal strength and tone, sensory exam grossly normal, mentation intact and speech normal     PSYCH: mentation appears normal and affect normal/bright     LYMPHATICS: No cervical adenopathy    No results for input(s): HGB, PLT, INR, NA, POTASSIUM, CR, A1C in the last 17567 hours.     Diagnostics:  LABS:   Office Visit on 10/04/2022   Component Date Value Ref Range Status     Sodium 10/04/2022 142  133 - 144 mmol/L Final     Potassium 10/04/2022 3.8  3.4 - 5.3 mmol/L Final     Chloride 10/04/2022 112 (A) 94 - 109 mmol/L Final     Carbon Dioxide (CO2) 10/04/2022 29  20 - 32 mmol/L Final     Anion Gap 10/04/2022 1 (A) 3 - 14 mmol/L Final     Urea Nitrogen 10/04/2022 16  7 - 30 mg/dL Final     Creatinine 10/04/2022 0.94  0.66 - 1.25 mg/dL Final     Calcium 10/04/2022 9.4  8.5 - 10.1 mg/dL Final     Glucose 10/04/2022 133 (A) 70 - 99 mg/dL Final     GFR Estimate 10/04/2022 87  >60 mL/min/1.73m2 Final    Effective December 21, 2021 eGFRcr in adults is calculated using the 2021 CKD-EPI creatinine equation which includes age and gender (Rochelle nava al., NEJM, DOI: 10.1056/ITLCnw0101680)     WBC Count 10/04/2022 6.7  4.0 - 11.0 10e3/uL Final     RBC Count 10/04/2022 5.09  4.40 - 5.90 10e6/uL Final     Hemoglobin 10/04/2022 16.0  13.3 - 17.7 g/dL Final     Hematocrit 10/04/2022 46.7  40.0 - 53.0 % Final     MCV 10/04/2022 92  78 - 100 fL Final     MCH 10/04/2022 31.4  26.5 - 33.0 pg Final     MCHC 10/04/2022 34.3  31.5 - 36.5 g/dL Final     RDW 10/04/2022 13.8  10.0 - 15.0 % Final     Platelet Count 10/04/2022 206  150 - 450 10e3/uL Final       EKG:  appears normal, NSR, normal axis, normal intervals, no acute ST/T changes c/w ischemia, no LVH by voltage criteria    Revised Cardiac Risk Index (RCRI):  The patient has the following serious cardiovascular risks for perioperative complications:   - No serious cardiac risks = 0 points     RCRI Interpretation: 0 points: Class I (very low risk - 0.4% complication rate)           Signed Electronically by: Yonis Martin MD  Copy of this evaluation report is provided to requesting physician.

## 2022-10-09 ENCOUNTER — HEALTH MAINTENANCE LETTER (OUTPATIENT)
Age: 72
End: 2022-10-09

## 2022-10-13 ENCOUNTER — TRANSFERRED RECORDS (OUTPATIENT)
Dept: HEALTH INFORMATION MANAGEMENT | Facility: CLINIC | Age: 72
End: 2022-10-13

## 2022-10-27 ENCOUNTER — TRANSFERRED RECORDS (OUTPATIENT)
Dept: HEALTH INFORMATION MANAGEMENT | Facility: CLINIC | Age: 72
End: 2022-10-27

## 2022-11-17 ENCOUNTER — DOCUMENTATION ONLY (OUTPATIENT)
Dept: LAB | Facility: CLINIC | Age: 72
End: 2022-11-17

## 2022-11-17 DIAGNOSIS — Z12.5 SCREENING FOR PROSTATE CANCER: Primary | ICD-10-CM

## 2022-11-17 DIAGNOSIS — Z00.00 ROUTINE HISTORY AND PHYSICAL EXAMINATION OF ADULT: ICD-10-CM

## 2022-11-17 DIAGNOSIS — Z96.642 STATUS POST TOTAL REPLACEMENT OF LEFT HIP: ICD-10-CM

## 2022-11-17 NOTE — PROGRESS NOTES
Pt has upcoming lab appt on 11/21 and a Px with you on 12/1.  No orders has been placed.  Pls place orders if applicable. Jocelyne Eubanks MA/javier

## 2022-11-21 ENCOUNTER — LAB (OUTPATIENT)
Dept: LAB | Facility: CLINIC | Age: 72
End: 2022-11-21
Payer: COMMERCIAL

## 2022-11-21 ENCOUNTER — MYC MEDICAL ADVICE (OUTPATIENT)
Dept: FAMILY MEDICINE | Facility: CLINIC | Age: 72
End: 2022-11-21

## 2022-11-21 DIAGNOSIS — Z96.642 STATUS POST TOTAL REPLACEMENT OF LEFT HIP: ICD-10-CM

## 2022-11-21 DIAGNOSIS — Z12.5 SCREENING FOR PROSTATE CANCER: ICD-10-CM

## 2022-11-21 DIAGNOSIS — Z00.00 ROUTINE HISTORY AND PHYSICAL EXAMINATION OF ADULT: ICD-10-CM

## 2022-11-21 LAB
CHOLEST SERPL-MCNC: 151 MG/DL
ERYTHROCYTE [DISTWIDTH] IN BLOOD BY AUTOMATED COUNT: 13.6 % (ref 10–15)
FASTING STATUS PATIENT QL REPORTED: YES
FASTING STATUS PATIENT QL REPORTED: YES
GLUCOSE BLD-MCNC: 102 MG/DL (ref 70–99)
HCT VFR BLD AUTO: 43.8 % (ref 40–53)
HDLC SERPL-MCNC: 49 MG/DL
HGB BLD-MCNC: 14.6 G/DL (ref 13.3–17.7)
LDLC SERPL CALC-MCNC: 90 MG/DL
MCH RBC QN AUTO: 31.3 PG (ref 26.5–33)
MCHC RBC AUTO-ENTMCNC: 33.3 G/DL (ref 31.5–36.5)
MCV RBC AUTO: 94 FL (ref 78–100)
NONHDLC SERPL-MCNC: 102 MG/DL
PLATELET # BLD AUTO: 218 10E3/UL (ref 150–450)
PSA SERPL-MCNC: 2.66 UG/L (ref 0–4)
RBC # BLD AUTO: 4.67 10E6/UL (ref 4.4–5.9)
TRIGL SERPL-MCNC: 61 MG/DL
WBC # BLD AUTO: 7.1 10E3/UL (ref 4–11)

## 2022-11-21 PROCEDURE — 80061 LIPID PANEL: CPT

## 2022-11-21 PROCEDURE — G0103 PSA SCREENING: HCPCS

## 2022-11-21 PROCEDURE — 36415 COLL VENOUS BLD VENIPUNCTURE: CPT

## 2022-11-21 PROCEDURE — 85027 COMPLETE CBC AUTOMATED: CPT

## 2022-11-21 PROCEDURE — 82947 ASSAY GLUCOSE BLOOD QUANT: CPT

## 2022-11-24 ASSESSMENT — ENCOUNTER SYMPTOMS
NAUSEA: 0
FREQUENCY: 0
NERVOUS/ANXIOUS: 0
PALPITATIONS: 0
CHILLS: 0
SHORTNESS OF BREATH: 0
ARTHRALGIAS: 0
HEMATURIA: 0
PARESTHESIAS: 0
WEAKNESS: 0
JOINT SWELLING: 0
DIARRHEA: 0
ABDOMINAL PAIN: 0
MYALGIAS: 0
HEMATOCHEZIA: 0
SORE THROAT: 0
CONSTIPATION: 0
COUGH: 0
EYE PAIN: 0
FEVER: 0
HEADACHES: 0
DYSURIA: 0
DIZZINESS: 0
HEARTBURN: 0

## 2022-11-24 ASSESSMENT — ACTIVITIES OF DAILY LIVING (ADL): CURRENT_FUNCTION: NO ASSISTANCE NEEDED

## 2022-12-01 ENCOUNTER — OFFICE VISIT (OUTPATIENT)
Dept: FAMILY MEDICINE | Facility: CLINIC | Age: 72
End: 2022-12-01
Payer: COMMERCIAL

## 2022-12-01 VITALS
TEMPERATURE: 97.5 F | HEART RATE: 69 BPM | DIASTOLIC BLOOD PRESSURE: 79 MMHG | BODY MASS INDEX: 22.4 KG/M2 | OXYGEN SATURATION: 98 % | HEIGHT: 71 IN | WEIGHT: 160 LBS | SYSTOLIC BLOOD PRESSURE: 125 MMHG

## 2022-12-01 DIAGNOSIS — Z96.642 STATUS POST TOTAL REPLACEMENT OF LEFT HIP: ICD-10-CM

## 2022-12-01 DIAGNOSIS — R73.01 IMPAIRED FASTING GLUCOSE: ICD-10-CM

## 2022-12-01 DIAGNOSIS — Z00.00 ENCOUNTER FOR MEDICARE ANNUAL WELLNESS EXAM: Primary | ICD-10-CM

## 2022-12-01 PROCEDURE — G0439 PPPS, SUBSEQ VISIT: HCPCS | Performed by: FAMILY MEDICINE

## 2022-12-01 ASSESSMENT — ENCOUNTER SYMPTOMS
MYALGIAS: 0
PALPITATIONS: 0
SORE THROAT: 0
ARTHRALGIAS: 0
PARESTHESIAS: 0
HEMATURIA: 0
HEARTBURN: 0
NAUSEA: 0
SHORTNESS OF BREATH: 0
COUGH: 0
FREQUENCY: 0
EYE PAIN: 0
DYSURIA: 0
NERVOUS/ANXIOUS: 0
ABDOMINAL PAIN: 0
WEAKNESS: 0
FEVER: 0
JOINT SWELLING: 0
CONSTIPATION: 0
HEMATOCHEZIA: 0
DIZZINESS: 0
DIARRHEA: 0
CHILLS: 0
HEADACHES: 0

## 2022-12-01 ASSESSMENT — ACTIVITIES OF DAILY LIVING (ADL): CURRENT_FUNCTION: NO ASSISTANCE NEEDED

## 2022-12-01 ASSESSMENT — PAIN SCALES - GENERAL: PAINLEVEL: NO PAIN (0)

## 2022-12-01 NOTE — PROGRESS NOTES
"    The patient was counseled and encouraged to consider modifying their diet and eating habits. He was provided with information on recommended healthy diet options.  The patient was provided with written information regarding signs of hearing loss.  Answers for HPI/ROS submitted by the patient on 11/24/2022  In general, how would you rate your overall physical health?: excellent  Frequency of exercise:: 2-3 days/week  Do you usually eat at least 4 servings of fruit and vegetables a day, include whole grains & fiber, and avoid regularly eating high fat or \"junk\" foods? : No  Taking medications regularly:: Yes  Activities of Daily Living: no assistance needed  Home safety: no safety concerns identified  Hearing Impairment:: difficulty following a conversation in a noisy restaurant or crowded room  In the past 6 months, have you been bothered by leaking of urine?: No  abdominal pain: No  Blood in stool: No  Blood in urine: No  chest pain: No  chills: No  congestion: No  constipation: No  cough: No  diarrhea: No  dizziness: No  ear pain: No  eye pain: No  nervous/anxious: No  fever: No  frequency: No  genital sores: No  headaches: No  hearing loss: No  heartburn: No  arthralgias: No  joint swelling: No  peripheral edema: No  mood changes: No  myalgias: No  nausea: No  dysuria: No  palpitations: No  Skin sensation changes: No  sore throat: No  urgency: No  rash: No  shortness of breath: No  visual disturbance: No  weakness: No  impotence: No  penile discharge: No  In general, how would you rate your overall mental or emotional health?: excellent  Additional concerns today:: No  Duration of exercise:: 30-45 minutes      "

## 2022-12-01 NOTE — PATIENT INSTRUCTIONS
Patient Education   Personalized Prevention Plan  You are due for the preventive services outlined below.  Your care team is available to assist you in scheduling these services.  If you have already completed any of these items, please share that information with your care team to update in your medical record.  Health Maintenance Due   Topic Date Due     Annual Wellness Visit  08/27/2022     ANNUAL REVIEW OF HM ORDERS  08/27/2022       Understanding USDA MyPlate  The USDA has guidelines to help you make healthy food choices. These are called MyPlate. MyPlate shows the food groups that make up healthy meals using the image of a place setting. Before you eat, think about the healthiest choices for what to put on your plate or in your cup or bowl. To learn more about building a healthy plate, visit www.choosemyplate.gov.    The food groups    Fruits. Any fruit or 100% fruit juice counts as part of the Fruit Group. Fruits may be fresh, canned, frozen, or dried, and may be whole, cut-up, or pureed. Make 1/2 of your plate fruits and vegetables.    Vegetables. Any vegetable or 100% vegetable juice counts as a member of the Vegetable Group. Vegetables may be fresh, frozen, canned, or dried. They can be served raw or cooked and may be whole, cut-up, or mashed. Make 1/2 of your plate fruits and vegetables.    Grains. All foods made from grains are part of the Grains Group. These include wheat, rice, oats, cornmeal, and barley. Grains are often used to make foods such as bread, pasta, oatmeal, cereal, tortillas, and grits. Grains should be no more than 1/4 of your plate. At least half of your grains should be whole grains.    Protein. This group includes meat, poultry, seafood, beans and peas, eggs, processed soy products (such as tofu), nuts (including nut butters), and seeds. Make protein choices no more than 1/4 of your plate. Meat and poultry choices should be lean or low fat.    Dairy. The Dairy Group includes all  fluid milk products and foods made from milk that contain calcium, such as yogurt and cheese. (Foods that have little calcium, such as cream, butter, and cream cheese, are not part of this group.) Most dairy choices should be low-fat or fat-free.    Oils. Oils aren't a food group, but they do contain essential nutrients. However it's important to watch your intake of oils. These are fats that are liquid at room temperature. They include canola, corn, olive, soybean, vegetable, and sunflower oil. Foods that are mainly oil include mayonnaise, certain salad dressings, and soft margarines. You likely already get your daily oil allowance from the foods you eat.  Things to limit  Eating healthy also means limiting these things in your diet:       Salt (sodium). Many processed foods have a lot of sodium. To keep sodium intake down, eat fresh vegetables, meats, poultry, and seafood when possible. Purchase low-sodium, reduced-sodium, or no-salt-added food products at the store. And don't add salt to your meals at home. Instead, season them with herbs and spices such as dill, oregano, cumin, and paprika. Or try adding flavor with lemon or lime zest and juice.    Saturated fat. Saturated fats are most often found in animal products such as beef, pork, and chicken. They are often solid at room temperature, such as butter. To reduce your saturated fat intake, choose leaner cuts of meat and poultry. And try healthier cooking methods such as grilling, broiling, roasting, or baking. For a simple lower-fat swap, use plain nonfat yogurt instead of mayonnaise when making potato salad or macaroni salad.    Added sugars. These are sugars added to foods. They are in foods such as ice cream, candy, soda, fruit drinks, sports drinks, energy drinks, cookies, pastries, jams, and syrups. Cut down on added sugars by sharing sweet treats with a family member or friend. You can also choose fruit for dessert, and drink water or other unsweetened  swathi Brennan last reviewed this educational content on 6/1/2020 2000-2021 [employer]. All rights reserved. This information is not intended as a substitute for professional medical care. Always follow your healthcare professional's instructions.          Signs of Hearing Loss      Hearing much better with one ear can be a sign of hearing loss.   Hearing loss is a problem shared by many people. In fact, it is one of the most common health problems, particularly as people age. Most people age 65 and older have some hearing loss. By age 80, almost everyone does. Hearing loss often occurs slowly over the years. So you may not realize your hearing has gotten worse.  Have your hearing checked  Call your healthcare provider if you:    Have to strain to hear normal conversation    Have to watch other people s faces very carefully to follow what they re saying    Need to ask people to repeat what they ve said    Often misunderstand what people are saying    Turn the volume of the television or radio up so high that others complain    Feel that people are mumbling when they re talking to you    Find that the effort to hear leaves you feeling tired and irritated    Notice, when using the phone, that you hear better with one ear than the other  [employer] last reviewed this educational content on 1/1/2020 2000-2021 [employer]. All rights reserved. This information is not intended as a substitute for professional medical care. Always follow your healthcare professional's instructions.

## 2022-12-01 NOTE — PROGRESS NOTES
"SUBJECTIVE:   Agustin is a 71 year old who presents for a Preventive Visit and follow-up after his left total hip replacement done in mid October.  That surgery seem to go well for him and he is not having any significant left hip pain now.  He only took a few oxycodone tablets after surgery.  He seems to be recovering well.  He is on no routine meds now.    Patient has been advised of split billing requirements and indicates understanding: Yes  Are you in the first 12 months of your Medicare coverage?      Healthy Habits:     In general, how would you rate your overall health?  Excellent    Frequency of exercise:  2-3 days/week    Duration of exercise:  30-45 minutes    Do you usually eat at least 4 servings of fruit and vegetables a day, include whole grains    & fiber and avoid regularly eating high fat or \"junk\" foods?  No    Taking medications regularly:  Yes    Ability to successfully perform activities of daily living:  No assistance needed    Home Safety:  No safety concerns identified    Hearing Impairment:  Difficulty following a conversation in a noisy restaurant or crowded room    In the past 6 months, have you been bothered by leaking of urine?  No    In general, how would you rate your overall mental or emotional health?  Excellent      PHQ-2 Total Score: 0    Additional concerns today:  No      Have you ever done Advance Care Planning? (For example, a Health Directive, POLST, or a discussion with a medical provider or your loved ones about your wishes): Yes, advance care planning is on file.       Fall risk  Fallen 2 or more times in the past year?: No  Any fall with injury in the past year?: No    Cognitive Screening   1) Repeat 3 items (Leader, Season, Table)    2) Clock draw: NORMAL  3) 3 item recall: Recalls 3 objects  Results: NORMAL clock, 1-2 items recalled: COGNITIVE IMPAIRMENT LESS LIKELY    Mini-CogTM Copyright FIORDALIZA Kimball. Licensed by the author for use in [facility]; reprinted with " permission ([email or web address]). All rights reserved.      Do you have sleep apnea, excessive snoring or daytime drowsiness?: no    Reviewed and updated as needed this visit by clinical staff    Allergies  Meds              Reviewed and updated as needed this visit by Provider                 Social History     Tobacco Use     Smoking status: Never     Smokeless tobacco: Never   Substance Use Topics     Alcohol use: No     If you drink alcohol do you typically have >3 drinks per day or >7 drinks per week? No    Alcohol Use 11/24/2022   Prescreen: >3 drinks/day or >7 drinks/week? No   Prescreen: >3 drinks/day or >7 drinks/week? -         Current providers sharing in care for this patient include:   Patient Care Team:  Yonis Martin MD as PCP - Doug Jean-Baptiste DO as Assigned Musculoskeletal Provider  oYnis Martin MD as Assigned PCP    The following health maintenance items are reviewed in Epic and correct as of today:  Health Maintenance   Topic Date Due     MEDICARE ANNUAL WELLNESS VISIT  08/27/2022     ANNUAL REVIEW OF HM ORDERS  08/27/2022     FALL RISK ASSESSMENT  12/01/2023     ADVANCE CARE PLANNING  10/26/2026     LIPID  11/21/2027     COLORECTAL CANCER SCREENING  12/31/2028     DTAP/TDAP/TD IMMUNIZATION (4 - Td or Tdap) 08/26/2030     HEPATITIS C SCREENING  Completed     PHQ-2 (once per calendar year)  Completed     INFLUENZA VACCINE  Completed     Pneumococcal Vaccine: 65+ Years  Completed     ZOSTER IMMUNIZATION  Completed     COVID-19 Vaccine  Completed     IPV IMMUNIZATION  Aged Out     MENINGITIS IMMUNIZATION  Aged Out     AORTIC ANEURYSM SCREENING (SYSTEM ASSIGNED)  Discontinued     Patient Active Problem List   Diagnosis     Actinic keratoses     History of skin cancer     Status post total replacement of left hip     Impaired fasting glucose     Past Surgical History:   Procedure Laterality Date     right ear skin CA removal and reconstruction  2012    had a skin graft from right  "side of neck, too     TONSILLECTOMY       TOTAL HIP ARTHROPLASTY Left 10/13/2022    Dr. Rankin of [facility]     VASECTOMY      VAS,REVERSAL,VAS     ZZC UNLISTED VASCULAR ENDOSCOPY PROC      VAS REVERSAL       Social History     Tobacco Use     Smoking status: Never     Smokeless tobacco: Never   Substance Use Topics     Alcohol use: No     Family History   Problem Relation Age of Onset     Hypertension Mother          age 94 of CVA     Hypertension Father      Cancer Father      Prostate Cancer Father         in mid 70's,  age 84 of pneumonia     Alzheimer Disease Father      Cancer Son          Current Outpatient Medications   Medication Sig Dispense Refill     NO ACTIVE MEDICATIONS        No Known Allergies          Review of Systems   Constitutional: Negative for chills and fever.   HENT: Negative for congestion, ear pain, hearing loss and sore throat.    Eyes: Negative for pain and visual disturbance.   Respiratory: Negative for cough and shortness of breath.    Cardiovascular: Negative for chest pain, palpitations and peripheral edema.   Gastrointestinal: Negative for abdominal pain, constipation, diarrhea, heartburn, hematochezia and nausea.   Genitourinary: Negative for dysuria, frequency, genital sores, hematuria, impotence, penile discharge and urgency.   Musculoskeletal: Negative for arthralgias, joint swelling and myalgias.   Skin: Negative for rash.   Neurological: Negative for dizziness, weakness, headaches and paresthesias.   Psychiatric/Behavioral: Negative for mood changes. The patient is not nervous/anxious.        OBJECTIVE:   /79 (BP Location: Right arm, Patient Position: Sitting, Cuff Size: Adult Regular)   Pulse 69   Temp 97.5  F (36.4  C) (Oral)   Ht 1.803 m (5' 11\")   Wt 72.6 kg (160 lb)   SpO2 98%   BMI 22.32 kg/m   Estimated body mass index is 22.32 kg/m  as calculated from the following:    Height as of this encounter: 1.803 m (5' 11\").    Weight as of this " encounter: 72.6 kg (160 lb).  Physical Exam  GENERAL: healthy, alert and no distress  EYES: Eyes grossly normal to inspection, PERRL and conjunctivae and sclerae normal  HENT: grossly normal  NECK: no adenopathy, no asymmetry, masses, or scars and thyroid normal to palpation  RESP: lungs clear to auscultation - no rales, rhonchi or wheezes  CV: regular rate and rhythm, normal S1 S2, no S3 or S4, no murmur, click or rub, no peripheral edema and peripheral pulses intact  ABDOMEN: soft, nontender, no hepatosplenomegaly, no masses   MS: no gross musculoskeletal defects noted, no edema.  He has a nicely healing scar over the left anterior hip from his recent surgery and there is no sign of infection there.  SKIN: Numerous actinic keratoses are visible on his face and forehead because he is going through some treatment for them from his dermatologist  NEURO: Normal strength and tone, mentation intact and speech normal  PSYCH: mentation appears normal, affect normal/bright    Diagnostic Test Results:  Labs reviewed in Epic  Lab on 11/21/2022   Component Date Value Ref Range Status     Glucose 11/21/2022 102 (H)  70 - 99 mg/dL Final     Patient Fasting > 8hrs? 11/21/2022 Yes   Final     Cholesterol 11/21/2022 151  <200 mg/dL Final     Triglycerides 11/21/2022 61  <150 mg/dL Final     Direct Measure HDL 11/21/2022 49  >=40 mg/dL Final     LDL Cholesterol Calculated 11/21/2022 90  <=100 mg/dL Final     Non HDL Cholesterol 11/21/2022 102  <130 mg/dL Final     Patient Fasting > 8hrs? 11/21/2022 Yes   Final     Prostate Specific Antigen Screen 11/21/2022 2.66  0.00 - 4.00 ug/L Final     WBC Count 11/21/2022 7.1  4.0 - 11.0 10e3/uL Final     RBC Count 11/21/2022 4.67  4.40 - 5.90 10e6/uL Final     Hemoglobin 11/21/2022 14.6  13.3 - 17.7 g/dL Final     Hematocrit 11/21/2022 43.8  40.0 - 53.0 % Final     MCV 11/21/2022 94  78 - 100 fL Final     MCH 11/21/2022 31.3  26.5 - 33.0 pg Final     MCHC 11/21/2022 33.3  31.5 - 36.5 g/dL  Final     RDW 11/21/2022 13.6  10.0 - 15.0 % Final     Platelet Count 11/21/2022 218  150 - 450 10e3/uL Final         ASSESSMENT / PLAN:       ICD-10-CM    1. Encounter for Medicare annual wellness exam  Z00.00       2. Impaired fasting glucose  R73.01       3. Status post total replacement of left hip  Z96.642         Blood pressure and other vital signs continue to look good  His fasting glucose was up slightly on these recent labs, but he did have a big carbohydrate meal the night before and he has not been as physically active in recent months as usual  He is also taking niacin for his skin which may be increasing his glucose slightly as well  We discussed diet and exercise treatment for that and we will plan to recheck it next year along with an A1c  He will continue ongoing care with dermatology and orthopedics in the meantime as per their recommendations  Plan a recheck in 1 year      COUNSELING:  Reviewed preventive health counseling, as reflected in patient instructions       Regular exercise       Healthy diet/nutrition        He reports that he has never smoked. He has never used smokeless tobacco.      Appropriate preventive services were discussed with this patient, including applicable screening as appropriate for cardiovascular disease, diabetes, osteopenia/osteoporosis, and glaucoma.  As appropriate for age/gender, discussed screening for colorectal cancer, prostate cancer, breast cancer, and cervical cancer. Checklist reviewing preventive services available has been given to the patient.    Reviewed patients plan of care and provided an AVS. The Basic Care Plan (routine screening as documented in Health Maintenance) for Trent meets the Care Plan requirement. This Care Plan has been established and reviewed with the Patient.      Yonis Martin MD  [facility]    Identified Health Risks:

## 2022-12-08 ENCOUNTER — TRANSFERRED RECORDS (OUTPATIENT)
Dept: HEALTH INFORMATION MANAGEMENT | Facility: CLINIC | Age: 72
End: 2022-12-08

## 2022-12-19 ENCOUNTER — TELEPHONE (OUTPATIENT)
Dept: NURSING | Facility: CLINIC | Age: 72
End: 2022-12-19

## 2022-12-19 NOTE — TELEPHONE ENCOUNTER
Coronavirus (COVID-19) Notification    Caller Name (Patient, parent, daughter/son, grandparent, etc)  Trent Estrada, patient    Reason for call tested positive yesterday    Gather patient reported symptoms      Assessment  Current Symptoms at time of phone call, reported by patient: (if no symptoms, document No symptoms]    Runny nose, cough, achy, fever of 100.0  Date of Symptom(s) onset (if applicable)    12/16/2022    If at time of call, Patients symptoms hare worsened, the Patient should contact 911 or have someone drive them to Emergency Dept promptly:         If Patient calling 911, inform 911 personal that you have tested positive for the Coronavirus (COVID-19).  Place mask on and await 911 to arrive.       If Emergency Dept, If possible, please have another adult drive you to the Emergency Dept but you need to wear mask when in contact with other people.        Your result was positive. This means you have COVID-19 (coronavirus).      How can I protect others?    These guidelines are for isolating before returning to work, school or .          If you DO have symptoms:      o    Stay home and away from others          ?    For at least 5 days after your symptoms started, AND           ?    You are fever free for 24 hours (with no medicine that reduces fever), AND          ?    Your other symptoms are better.      o    Wear a mask for 10 full days any time you are around others.       If you DON'T have symptoms:      o    Stay at home and away from others for at least 5 days after your positive test.      o    Wear a mask for 10 full days any time you are around others.    There may be different guidelines for healthcare facilities. Please check with the specific sites before arriving.     If you've been told by a doctor that you were severely ill with COVID-19 or are immunocompromised, you should isolate for at least 10 days.    You should not go back to work until you meet the guidelines above for  ending your home isolation. You don't need to be retested for COVID-19 before going back to work--studies show that you won't spread the virus if it's been at least 10 days since your symptoms started (or 20 days, if you have a weak immune system).    Employers, schools, and daycares: This is an official notice for this person's medical guidelines for returning in-person. They must meet the above guidelines before going back to work, school, or  in person.    Would you like me to review some of that information with you now?  Yes    How can I take care of myself?      Get lots of rest. Drink extra fluids (unless a doctor has told you not to).      Take Tylenol (acetaminophen) for fever or pain. If you have liver or kidney problems, ask your family doctor if it's okay to take Tylenol.     Take either:     650 mg (two 325 mg pills) every 4 to 6 hours, or     1,000 mg (two 500 mg pills) every 8 hours as needed.     Note: Do not take more than 3,000 mg in one day. Acetaminophen is found in many medicines (both prescribed and over-the-counter medicines). Read all labels to be sure you don't take too much.        Know when to call 911: Emergency warning signs include:    Trouble breathing or shortness of breath    Pain or pressure in the chest that doesn't go away    Feeling confused like you haven't felt before, or not being able to wake up    Bluish-colored lips or face        If you were tested for an upcoming procedure, please contact your provider for next steps.     Meredith Velasco RN

## 2023-04-03 ENCOUNTER — TRANSFERRED RECORDS (OUTPATIENT)
Dept: HEALTH INFORMATION MANAGEMENT | Facility: CLINIC | Age: 73
End: 2023-04-03
Payer: COMMERCIAL

## 2023-05-25 ENCOUNTER — TRANSFERRED RECORDS (OUTPATIENT)
Dept: HEALTH INFORMATION MANAGEMENT | Facility: CLINIC | Age: 73
End: 2023-05-25

## 2023-06-15 ENCOUNTER — TRANSFERRED RECORDS (OUTPATIENT)
Dept: HEALTH INFORMATION MANAGEMENT | Facility: CLINIC | Age: 73
End: 2023-06-15
Payer: COMMERCIAL

## 2023-06-15 ENCOUNTER — MEDICAL CORRESPONDENCE (OUTPATIENT)
Dept: HEALTH INFORMATION MANAGEMENT | Facility: CLINIC | Age: 73
End: 2023-06-15
Payer: COMMERCIAL

## 2023-06-27 ENCOUNTER — TRANSCRIBE ORDERS (OUTPATIENT)
Dept: OTHER | Age: 73
End: 2023-06-27

## 2023-06-27 DIAGNOSIS — L81.4 OTHER MELANIN HYPERPIGMENTATION: ICD-10-CM

## 2023-06-27 DIAGNOSIS — Z87.2 PERSONAL HISTORY OF DISEASES OF THE SKIN AND SUBCUTANEOUS TISSUE: ICD-10-CM

## 2023-06-27 DIAGNOSIS — D48.5 NEOPLASM OF UNCERTAIN BEHAVIOR OF SKIN: ICD-10-CM

## 2023-06-27 DIAGNOSIS — Z85.828 PERSONAL HISTORY OF OTHER MALIGNANT NEOPLASM OF SKIN: ICD-10-CM

## 2023-06-27 DIAGNOSIS — D22.5 MELANOCYTIC NEVI OF TRUNK: Primary | ICD-10-CM

## 2023-06-28 ENCOUNTER — OFFICE VISIT (OUTPATIENT)
Dept: FAMILY MEDICINE | Facility: CLINIC | Age: 73
End: 2023-06-28
Payer: COMMERCIAL

## 2023-06-28 VITALS
BODY MASS INDEX: 22.26 KG/M2 | HEIGHT: 71 IN | DIASTOLIC BLOOD PRESSURE: 79 MMHG | HEART RATE: 60 BPM | WEIGHT: 159 LBS | SYSTOLIC BLOOD PRESSURE: 125 MMHG | TEMPERATURE: 97.6 F | OXYGEN SATURATION: 100 %

## 2023-06-28 DIAGNOSIS — Z85.828 HISTORY OF SKIN CANCER: ICD-10-CM

## 2023-06-28 DIAGNOSIS — C43.9: ICD-10-CM

## 2023-06-28 DIAGNOSIS — Z01.818 PREOP GENERAL PHYSICAL EXAM: Primary | ICD-10-CM

## 2023-06-28 PROCEDURE — 99214 OFFICE O/P EST MOD 30 MIN: CPT | Performed by: FAMILY MEDICINE

## 2023-06-28 ASSESSMENT — PAIN SCALES - GENERAL: PAINLEVEL: NO PAIN (0)

## 2023-06-28 NOTE — PROGRESS NOTES
[facility]  [address]  [place] [address]  Phone: [phone]  Primary Provider: Yonis Crespo  Pre-op Performing Provider: YONIS CRESPO      PREOPERATIVE EVALUATION:  Today's date: 6/28/2023    Trent Estrada is a 72 year old male who presents for a preoperative evaluation.      6/28/2023    10:41 AM   Additional Questions   Roomed by cam   Accompanied by none         6/28/2023    10:41 AM   Patient Reported Additional Medications   Patient reports taking the following new medications none     Surgical Information:  Surgery/Procedure: Excision of right upper forehead melanoma with skin graft from right clavicle and sentinel lymph node biopsy  Surgery Location: [facility]  Surgeon: Dr. Floyd  Surgery Date: 07/11/23  Time of Surgery: 9:00 AM  Where patient plans to recover: At home with family  Fax number for surgical facility: [phone] then also fax to [facility] at [phone]    Assessment & Plan     The proposed surgical procedure is considered LOW risk.      ICD-10-CM    1. 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 History of skin cancer  Z85.828            - No identified additional risk factors other than previously addressed    Antiplatelet or Anticoagulation Medication Instructions:   - Patient is on no antiplatelet or anticoagulation medications.    Additional Medication Instructions:  Patient is on no additional chronic medications    RECOMMENDATION:  APPROVAL GIVEN to proceed with proposed procedure, without further diagnostic evaluation.        Subjective       HPI related to upcoming procedure: 72-year-old male with a history of multiple nonmelanoma skin cancers was noted to have a lesion on his right upper forehead a month ago.  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  He is coming in now for surgical treatment of this as above.        6/21/2023     9:00 AM   Preop  Questions   1. Have you ever had a heart attack or stroke? No   2. Have you ever had surgery on your heart or blood vessels, such as a stent placement, a coronary artery bypass, or surgery on an artery in your head, neck, heart, or legs? No   3. Do you have chest pain with activity? No   4. Do you have a history of  heart failure? No   5. Do you currently have a cold, bronchitis or symptoms of other infection? No   6. Do you have a cough, shortness of breath, or wheezing? No   7. Do you or anyone in your family have previous history of blood clots? No   8. Do you or does anyone in your family have a serious bleeding problem such as prolonged bleeding following surgeries or cuts? No   9. Have you ever had problems with anemia or been told to take iron pills? No   10. Have you had any abnormal blood loss such as black, tarry or bloody stools? No   11. Have you ever had a blood transfusion? No   12. Are you willing to have a blood transfusion if it is medically needed before, during, or after your surgery? Yes   13. Have you or any of your relatives ever had problems with anesthesia? No   14. Do you have sleep apnea, excessive snoring or daytime drowsiness? No   15. Do you have any artifical heart valves or other implanted medical devices like a pacemaker, defibrillator, or continuous glucose monitor? No   16. Do you have artificial joints? YES - left hip   17. Are you allergic to latex? No         Status of Chronic Conditions:  See problem list for active medical problems.  Problems all longstanding and stable, except as noted/documented.  See ROS for pertinent symptoms related to these conditions.      Review of Systems  CONSTITUTIONAL: NEGATIVE for fever, chills, change in weight  INTEGUMENTARY/SKIN: See above  EYES: NEGATIVE for vision changes or irritation  ENT/MOUTH: NEGATIVE for ear, mouth and throat problems  RESP: NEGATIVE for significant cough or SOB  CV: NEGATIVE for chest pain, palpitations or peripheral  edema  GI: NEGATIVE for nausea, abdominal pain, heartburn, or change in bowel habits  : NEGATIVE for frequency, dysuria, or hematuria  MUSCULOSKELETAL: NEGATIVE for significant arthralgias or myalgia  NEURO: NEGATIVE for weakness, dizziness or paresthesias  ENDOCRINE: NEGATIVE for temperature intolerance, skin/hair changes  HEME: NEGATIVE for bleeding problems  PSYCHIATRIC: NEGATIVE for changes in mood or affect    Patient Active Problem List    Diagnosis Date Noted     Spindle cell melanoma (H) 2023     Priority: Medium     on forehead       Status post total replacement of left hip 2022     Priority: Medium     Impaired fasting glucose 2022     Priority: Medium     History of skin cancer 2013     Priority: Medium     Actinic keratoses      Priority: Medium     sees derm q 6 mo's for this        Past Medical History:   Diagnosis Date     Actinic keratoses     sees derm q 6 mo's for this     AR (allergic rhinitis)      Osteoarthritis of both hips      Skin cancer     multiple -- forehead, right ear, etc.     Spindle cell melanoma (H) 2023    On forehead     Past Surgical History:   Procedure Laterality Date     right ear skin CA removal and reconstruction      had a skin graft from right side of neck, too     TONSILLECTOMY       TOTAL HIP ARTHROPLASTY Left 10/13/2022    Dr. Rankin of [facility]     VASECTOMY      VAS,REVERSAL,VAS     ZZC UNLISTED VASCULAR ENDOSCOPY PROC      VAS REVERSAL     Current Outpatient Medications   Medication Sig Dispense Refill     NO ACTIVE MEDICATIONS          No Known Allergies     Social History     Tobacco Use     Smoking status: Never     Smokeless tobacco: Never   Substance Use Topics     Alcohol use: No     Family History   Problem Relation Age of Onset     Hypertension Mother          age 94 of CVA     Hypertension Father      Cancer Father      Prostate Cancer Father         in mid 70's,  age 84 of pneumonia     Alzheimer  "Disease Father      Cancer Son      History   Drug Use No         Objective     /79 (BP Location: Left arm, Patient Position: Sitting, Cuff Size: Adult Regular)   Pulse 60   Temp 97.6  F (36.4  C) (Oral)   Ht 1.794 m (5' 10.63\")   Wt 72.1 kg (159 lb)   SpO2 100%   BMI 22.41 kg/m      Physical Exam    GENERAL APPEARANCE: healthy, alert and no distress     EYES: EOMI,  PERRL     HENT: Grossly normal     NECK: no adenopathy, no asymmetry, masses, or scars and thyroid normal to palpation     RESP: lungs clear to auscultation - no rales, rhonchi or wheezes     CV: regular rates and rhythm, normal S1 S2, no S3 or S4 and no murmur, click or rub     ABDOMEN:  soft, nontender, no HSM or masses     MS: extremities normal - no gross deformities noted, no evidence of inflammation in joints     SKIN: He has a roughly three-quarter centimeter diameter raised nodular lesion on the right upper forehead.  Numerous other scattered areas of actinic change.     NEURO: Normal strength and tone, sensory exam grossly normal, mentation intact and speech normal     PSYCH: mentation appears normal and affect normal/bright     LYMPHATICS: No appreciable anterior or posterior cervical, pre or postauricular, or supraclavicular adenopathy    Recent Labs   Lab Test 11/21/22  0803 10/04/22  1007   HGB 14.6 16.0    206   NA  --  142   POTASSIUM  --  3.8   CR  --  0.94        Diagnostics:  No labs were ordered during this visit.     Lab on 11/21/2022   Component Date Value Ref Range Status     Glucose 11/21/2022 102 (H)  70 - 99 mg/dL Final     Patient Fasting > 8hrs? 11/21/2022 Yes   Final     Cholesterol 11/21/2022 151  <200 mg/dL Final     Triglycerides 11/21/2022 61  <150 mg/dL Final     Direct Measure HDL 11/21/2022 49  >=40 mg/dL Final     LDL Cholesterol Calculated 11/21/2022 90  <=100 mg/dL Final     Non HDL Cholesterol 11/21/2022 102  <130 mg/dL Final     Patient Fasting > 8hrs? 11/21/2022 Yes   Final     Prostate " Specific Antigen Screen 11/21/2022 2.66  0.00 - 4.00 ug/L Final     WBC Count 11/21/2022 7.1  4.0 - 11.0 10e3/uL Final     RBC Count 11/21/2022 4.67  4.40 - 5.90 10e6/uL Final     Hemoglobin 11/21/2022 14.6  13.3 - 17.7 g/dL Final     Hematocrit 11/21/2022 43.8  40.0 - 53.0 % Final     MCV 11/21/2022 94  78 - 100 fL Final     MCH 11/21/2022 31.3  26.5 - 33.0 pg Final     MCHC 11/21/2022 33.3  31.5 - 36.5 g/dL Final     RDW 11/21/2022 13.6  10.0 - 15.0 % Final     Platelet Count 11/21/2022 218  150 - 450 10e3/uL Final       No EKG required for low risk surgery (cataract, skin procedure, breast biopsy, etc).  No EKG required, no history of coronary heart disease, significant arrhythmia, peripheral arterial disease or other structural heart disease.    Revised Cardiac Risk Index (RCRI):  The patient has the following serious cardiovascular risks for perioperative complications:   - No serious cardiac risks = 0 points     RCRI Interpretation: 0 points: Class I (very low risk - 0.4% complication rate)           Signed Electronically by: Yonis Martin MD  Copy of this evaluation report is provided to requesting physician.

## 2023-06-28 NOTE — PROGRESS NOTES
Pre-op faxed to both numbers.     Genny Lester -    [facility]

## 2023-08-18 ENCOUNTER — MYC MEDICAL ADVICE (OUTPATIENT)
Dept: FAMILY MEDICINE | Facility: CLINIC | Age: 73
End: 2023-08-18
Payer: COMMERCIAL

## 2023-11-02 ENCOUNTER — TRANSFERRED RECORDS (OUTPATIENT)
Dept: HEALTH INFORMATION MANAGEMENT | Facility: CLINIC | Age: 73
End: 2023-11-02
Payer: COMMERCIAL

## 2023-11-09 ENCOUNTER — TRANSFERRED RECORDS (OUTPATIENT)
Dept: HEALTH INFORMATION MANAGEMENT | Facility: CLINIC | Age: 73
End: 2023-11-09
Payer: COMMERCIAL

## 2023-11-16 ENCOUNTER — MYC MEDICAL ADVICE (OUTPATIENT)
Dept: FAMILY MEDICINE | Facility: CLINIC | Age: 73
End: 2023-11-16
Payer: COMMERCIAL

## 2023-11-16 DIAGNOSIS — Z00.00 ROUTINE GENERAL MEDICAL EXAMINATION AT A HEALTH CARE FACILITY: ICD-10-CM

## 2023-11-16 DIAGNOSIS — R73.01 IMPAIRED FASTING GLUCOSE: Primary | ICD-10-CM

## 2023-11-27 ASSESSMENT — ENCOUNTER SYMPTOMS
NERVOUS/ANXIOUS: 0
HEMATURIA: 0
MYALGIAS: 0
COUGH: 0
PARESTHESIAS: 0
CONSTIPATION: 0
FEVER: 0
HEARTBURN: 0
SORE THROAT: 0
DYSURIA: 0
HEMATOCHEZIA: 0
JOINT SWELLING: 0
ARTHRALGIAS: 0
NAUSEA: 0
ABDOMINAL PAIN: 0
FREQUENCY: 0
SHORTNESS OF BREATH: 0
DIZZINESS: 0
PALPITATIONS: 0
DIARRHEA: 0
WEAKNESS: 0
EYE PAIN: 0
CHILLS: 0
HEADACHES: 0

## 2023-11-27 ASSESSMENT — ACTIVITIES OF DAILY LIVING (ADL): CURRENT_FUNCTION: NO ASSISTANCE NEEDED

## 2023-11-29 ENCOUNTER — LAB (OUTPATIENT)
Dept: LAB | Facility: CLINIC | Age: 73
End: 2023-11-29
Payer: COMMERCIAL

## 2023-11-29 DIAGNOSIS — C43.9: ICD-10-CM

## 2023-11-29 LAB
ALBUMIN SERPL BCG-MCNC: 4.1 G/DL (ref 3.5–5.2)
ALP SERPL-CCNC: 89 U/L (ref 40–150)
ALT SERPL W P-5'-P-CCNC: 25 U/L (ref 0–70)
ANION GAP SERPL CALCULATED.3IONS-SCNC: 11 MMOL/L (ref 7–15)
AST SERPL W P-5'-P-CCNC: 34 U/L (ref 0–45)
BILIRUB SERPL-MCNC: 0.5 MG/DL
BUN SERPL-MCNC: 17.3 MG/DL (ref 8–23)
CALCIUM SERPL-MCNC: 9.1 MG/DL (ref 8.8–10.2)
CHLORIDE SERPL-SCNC: 107 MMOL/L (ref 98–107)
CHOLEST SERPL-MCNC: 140 MG/DL
CREAT SERPL-MCNC: 0.99 MG/DL (ref 0.67–1.17)
DEPRECATED HCO3 PLAS-SCNC: 24 MMOL/L (ref 22–29)
EGFRCR SERPLBLD CKD-EPI 2021: 81 ML/MIN/1.73M2
ERYTHROCYTE [DISTWIDTH] IN BLOOD BY AUTOMATED COUNT: 13 % (ref 10–15)
GLUCOSE SERPL-MCNC: 90 MG/DL (ref 70–99)
HBA1C MFR BLD: 5.4 % (ref 0–5.6)
HCT VFR BLD AUTO: 45.1 % (ref 40–53)
HDLC SERPL-MCNC: 45 MG/DL
HGB BLD-MCNC: 15.2 G/DL (ref 13.3–17.7)
LDLC SERPL CALC-MCNC: 84 MG/DL
MCH RBC QN AUTO: 30.6 PG (ref 26.5–33)
MCHC RBC AUTO-ENTMCNC: 33.7 G/DL (ref 31.5–36.5)
MCV RBC AUTO: 91 FL (ref 78–100)
NONHDLC SERPL-MCNC: 95 MG/DL
PLATELET # BLD AUTO: 192 10E3/UL (ref 150–450)
POTASSIUM SERPL-SCNC: 4.2 MMOL/L (ref 3.4–5.3)
PROT SERPL-MCNC: 6.9 G/DL (ref 6.4–8.3)
PSA SERPL DL<=0.01 NG/ML-MCNC: 2.47 NG/ML (ref 0–6.5)
RBC # BLD AUTO: 4.96 10E6/UL (ref 4.4–5.9)
SODIUM SERPL-SCNC: 142 MMOL/L (ref 135–145)
TRIGL SERPL-MCNC: 57 MG/DL
WBC # BLD AUTO: 6.6 10E3/UL (ref 4–11)

## 2023-11-29 PROCEDURE — G0103 PSA SCREENING: HCPCS

## 2023-11-29 PROCEDURE — 80053 COMPREHEN METABOLIC PANEL: CPT

## 2023-11-29 PROCEDURE — 80061 LIPID PANEL: CPT

## 2023-11-29 PROCEDURE — 85027 COMPLETE CBC AUTOMATED: CPT

## 2023-11-29 PROCEDURE — 36415 COLL VENOUS BLD VENIPUNCTURE: CPT

## 2023-11-29 PROCEDURE — 83036 HEMOGLOBIN GLYCOSYLATED A1C: CPT

## 2023-12-04 ENCOUNTER — OFFICE VISIT (OUTPATIENT)
Dept: FAMILY MEDICINE | Facility: CLINIC | Age: 73
End: 2023-12-04
Payer: COMMERCIAL

## 2023-12-04 VITALS
BODY MASS INDEX: 22.96 KG/M2 | TEMPERATURE: 98.4 F | OXYGEN SATURATION: 98 % | SYSTOLIC BLOOD PRESSURE: 117 MMHG | DIASTOLIC BLOOD PRESSURE: 72 MMHG | WEIGHT: 164 LBS | HEIGHT: 71 IN | HEART RATE: 65 BPM

## 2023-12-04 DIAGNOSIS — B35.6 TINEA CRURIS: ICD-10-CM

## 2023-12-04 DIAGNOSIS — L85.3 DRY SKIN: ICD-10-CM

## 2023-12-04 DIAGNOSIS — Z00.00 ENCOUNTER FOR MEDICARE ANNUAL WELLNESS EXAM: Primary | ICD-10-CM

## 2023-12-04 DIAGNOSIS — L57.0 ACTINIC KERATOSES: ICD-10-CM

## 2023-12-04 DIAGNOSIS — Z85.828 HISTORY OF SKIN CANCER: ICD-10-CM

## 2023-12-04 DIAGNOSIS — C43.9: ICD-10-CM

## 2023-12-04 PROCEDURE — 99397 PER PM REEVAL EST PAT 65+ YR: CPT | Performed by: FAMILY MEDICINE

## 2023-12-04 ASSESSMENT — ENCOUNTER SYMPTOMS
FEVER: 0
NERVOUS/ANXIOUS: 0
EYE PAIN: 0
JOINT SWELLING: 0
NAUSEA: 0
ABDOMINAL PAIN: 0
SHORTNESS OF BREATH: 0
SORE THROAT: 0
CONSTIPATION: 0
WEAKNESS: 0
HEARTBURN: 0
HEMATOCHEZIA: 0
DIARRHEA: 0
DYSURIA: 0
CHILLS: 0
FREQUENCY: 0
PALPITATIONS: 0
HEMATURIA: 0
MYALGIAS: 0
DIZZINESS: 0
PARESTHESIAS: 0
ARTHRALGIAS: 0
HEADACHES: 0
COUGH: 0

## 2023-12-04 ASSESSMENT — ACTIVITIES OF DAILY LIVING (ADL): CURRENT_FUNCTION: NO ASSISTANCE NEEDED

## 2023-12-04 ASSESSMENT — PAIN SCALES - GENERAL: PAINLEVEL: NO PAIN (0)

## 2023-12-04 NOTE — PATIENT INSTRUCTIONS
Patient Education   Personalized Prevention Plan  You are due for the preventive services outlined below.  Your care team is available to assist you in scheduling these services.  If you have already completed any of these items, please share that information with your care team to update in your medical record.  Health Maintenance Due   Topic Date Due     ANNUAL REVIEW OF HM ORDERS  12/01/2023     Annual Wellness Visit  12/01/2023     Learning About Dietary Guidelines  What are the Dietary Guidelines for Americans?     Dietary Guidelines for Americans provide tips for eating well and staying healthy. This helps reduce the risk for long-term (chronic) diseases.  These guidelines recommend that you:  Eat and drink the right amount for you. The U.S[place] government's food guide is called MyPlate. It can help you make your own well-balanced eating plan.  Try to balance your eating with your activity. This helps you stay at a healthy weight.  Drink alcohol in moderation, if at all.  Limit foods high in salt, saturated fat, trans fat, and added sugar.  These guidelines are from the U.S. Department of Agriculture and the U.S. Department of Health and Human Services. They are updated every 5 years.  What is MyPlate?  MyPlate is the U.S[place] government's food guide. It can help you make your own well-balanced eating plan. A balanced eating plan means that you eat enough, but not too much, and that your food gives you the nutrients you need to stay healthy.  MyPlate focuses on eating plenty of whole grains, fruits, and vegetables, and on limiting fat and sugar. It is available online at www.ChooseMyPlate.gov.  How can you get started?  If you're trying to eat healthier, you can slowly change your eating habits over time. You don't have to make big changes all at once. Start by adding one or two healthy foods to your meals each day.  Grains  Choose whole-grain breads and cereals and whole-wheat pasta and whole-grain  "crackers.  Vegetables  Eat a variety of vegetables every day. They have lots of nutrients and are part of a heart-healthy diet.  Fruits  Eat a variety of fruits every day. Fruits contain lots of nutrients. Choose fresh fruit instead of fruit juice.  Protein foods  Choose fish and lean poultry more often. Eat red meat and fried meats less often. Dried beans, tofu, and nuts are also good sources of protein.  Dairy  Choose low-fat or fat-free products from this food group. If you have problems digesting milk, try eating cheese or yogurt instead.  Fats and oils  Limit fats and oils if you're trying to cut calories. Choose healthy fats when you cook. These include canola oil and olive oil.  Where can you learn more?  Go to https://www.[employer].net/patiented  Enter D676 in the search box to learn more about \"Learning About Dietary Guidelines.\"  Current as of: February 28, 2023               Content Version: 13.8    0221-1968 [employer].   Care instructions adapted under license by your healthcare professional. If you have questions about a medical condition or this instruction, always ask your healthcare professional. [employer] disclaims any warranty or liability for your use of this information.      Hearing Loss: Care Instructions  Overview     Hearing loss is a sudden or slow decrease in how well you hear. It can range from slight to profound. Permanent hearing loss can occur with aging. It also can happen when you are exposed long-term to loud noise. Examples include listening to loud music, riding motorcycles, or being around other loud machines.  Hearing loss can affect your work and home life. It can make you feel lonely or depressed. You may feel that you have lost your independence. But hearing aids and other devices can help you hear better and feel connected to others.  Follow-up care is a key part of your treatment and safety. Be sure to make and go to all appointments, and call " your doctor if you are having problems. It's also a good idea to know your test results and keep a list of the medicines you take.  How can you care for yourself at home?  Avoid loud noises whenever possible. This helps keep your hearing from getting worse.  Always wear hearing protection around loud noises.  Wear a hearing aid as directed.  A professional can help you pick a hearing aid that will work best for you.  You can also get hearing aids over the counter for mild to moderate hearing loss.  Have hearing tests as your doctor suggests. They can show whether your hearing has changed. Your hearing aid may need to be adjusted.  Use other devices as needed. These may include:  Telephone amplifiers and hearing aids that can connect to a television, stereo, radio, or microphone.  Devices that use lights or vibrations. These alert you to the doorbell, a ringing telephone, or a baby monitor.  Television closed-captioning. This shows the words at the bottom of the screen. Most new TVs can do this.  TTY (text telephone). This lets you type messages back and forth on the telephone instead of talking or listening. These devices are also called TDD. When messages are typed on the keyboard, they are sent over the phone line to a receiving TTY. The message is shown on a monitor.  Use text messaging, social media, and email if it is hard for you to communicate by telephone.  Try to learn a listening technique called speechreading. It is not lipreading. You pay attention to people's gestures, expressions, posture, and tone of voice. These clues can help you understand what a person is saying. Face the person you are talking to, and have them face you. Make sure the lighting is good. You need to see the other person's face clearly.  Think about counseling if you need help to adjust to your hearing loss.  When should you call for help?  Watch closely for changes in your health, and be sure to contact your doctor if:    You think  "your hearing is getting worse.     You have new symptoms, such as dizziness or nausea.   Where can you learn more?  Go to https://www.[employer].net/patiented  Enter R798 in the search box to learn more about \"Hearing Loss: Care Instructions.\"  Current as of: February 28, 2023               Content Version: 13.8    3065-5845 [employer].   Care instructions adapted under license by your healthcare professional. If you have questions about a medical condition or this instruction, always ask your healthcare professional. [employer], [employer] disclaims any warranty or liability for your use of this information.         "

## 2023-12-04 NOTE — PROGRESS NOTES
"SUBJECTIVE:   Agustin is a 72 year old, presenting for the following:  Physical        12/4/2023     8:59 AM   Additional Questions   Roomed by cam   Accompanied by none         12/4/2023     8:59 AM   Patient Reported Additional Medications   Patient reports taking the following new medications cam       Are you in the first 12 months of your Medicare coverage?      Healthy Habits:     In general, how would you rate your overall health?  Good    Frequency of exercise:  4-5 days/week    Duration of exercise:  30-45 minutes    Do you usually eat at least 4 servings of fruit and vegetables a day, include whole grains    & fiber and avoid regularly eating high fat or \"junk\" foods?  No    Taking medications regularly:  Yes    Barriers to taking medications:  None    Medication side effects:  None    Ability to successfully perform activities of daily living:  No assistance needed    Home Safety:  No safety concerns identified    Hearing Impairment:  Difficulty following a conversation in a noisy restaurant or crowded room    In the past 6 months, have you been bothered by leaking of urine?  No    In general, how would you rate your overall mental or emotional health?  Excellent    Additional concerns today:  No      Today's PHQ-2 Score:       12/3/2023    11:17 AM   PHQ-2 ( 1999 [employer])   Q1: Little interest or pleasure in doing things 0   Q2: Feeling down, depressed or hopeless 0   PHQ-2 Score 0   Q1: Little interest or pleasure in doing things Not at all   Q2: Feeling down, depressed or hopeless Not at all   PHQ-2 Score 0           Have you ever done Advance Care Planning? (For example, a Health Directive, POLST, or a discussion with a medical provider or your loved ones about your wishes): Yes, advance care planning is on file.       Fall risk  Fallen 2 or more times in the past year?: No  Any fall with injury in the past year?: No    Cognitive Screening   1) Repeat 3 items (Leader, Season, Table)    2) Clock draw: " NORMAL  3) 3 item recall: Recalls 3 objects  Results: NORMAL clock, 1-2 items recalled: COGNITIVE IMPAIRMENT LESS LIKELY    Mini-CogTM Copyright FIORDALIZA Kimball. Licensed by the author for use in [facility]; reprinted with permission (julian@[facility]). All rights reserved.          Reviewed and updated as needed this visit by clinical staff   Tobacco                Reviewed and updated as needed this visit by Provider                 Social History     Tobacco Use     Smoking status: Never     Smokeless tobacco: Never   Substance Use Topics     Alcohol use: No             11/27/2023     9:14 AM   Alcohol Use   Prescreen: >3 drinks/day or >7 drinks/week? No     Do you have a current opioid prescription? No  Do you use any other controlled substances or medications that are not prescribed by a provider? 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  He had radiation treatment after that.  His PET scan has been negative and it was felt that he likely had curative treatment, but he will be followed closely by dermatology going forward.  He remains on no routine prescription medications.  He has been receiving Keytruda immunotherapy.    He had some dry itchy skin in his lower back.  He has also had some itchy skin in the groin area and scrotum recently.  He wanted me to look at those areas.      Current providers sharing in care for this patient include:   Patient Care Team:  Yonis Martin MD as PCP - General  Yonis Martin MD as Assigned PCP    The following health maintenance items are reviewed in Epic and correct as of today:  Health Maintenance   Topic Date Due     ANNUAL REVIEW OF HM ORDERS  12/01/2023     MEDICARE ANNUAL WELLNESS VISIT  12/01/2023     FALL RISK ASSESSMENT  12/04/2024     ADVANCE CARE PLANNING  12/01/2027     LIPID  11/29/2028     COLORECTAL CANCER SCREENING  12/31/2028      DTAP/TDAP/TD IMMUNIZATION (3 - Td or Tdap) 2030     HEPATITIS C SCREENING  Completed     PHQ-2 (once per calendar year)  Completed     INFLUENZA VACCINE  Completed     Pneumococcal Vaccine: 65+ Years  Completed     ZOSTER IMMUNIZATION  Completed     RSV VACCINE (Pregnancy & 60+)  Completed     COVID-19 Vaccine  Completed     IPV IMMUNIZATION  Aged Out     HPV IMMUNIZATION  Aged Out     MENINGITIS IMMUNIZATION  Aged Out     RSV MONOCLONAL ANTIBODY  Aged Out     AORTIC ANEURYSM SCREENING (SYSTEM ASSIGNED)  Discontinued     Patient Active Problem List   Diagnosis     Actinic keratoses     History of skin cancer     Status post total replacement of left hip     Impaired fasting glucose     Spindle cell melanoma (H)     Past Surgical History:   Procedure Laterality Date     right ear skin CA removal and reconstruction      had a skin graft from right side of neck, too     TONSILLECTOMY       TOTAL HIP ARTHROPLASTY Left 10/13/2022    Dr. Rankin of [facility]     VASECTOMY      VAS,REVERSAL,VAS -- in the        Social History     Tobacco Use     Smoking status: Never     Smokeless tobacco: Never   Substance Use Topics     Alcohol use: No     Family History   Problem Relation Age of Onset     Hypertension Mother          age 94 of CVA     Hypertension Father      Cancer Father      Prostate Cancer Father         in mid 70's,  age 84 of pneumonia     Alzheimer Disease Father      Cancer Son          Current Outpatient Medications   Medication Sig Dispense Refill     NO ACTIVE MEDICATIONS        No Known Allergies          Review of Systems   Constitutional:  Negative for chills and fever.   HENT:  Negative for congestion, ear pain, hearing loss and sore throat.    Eyes:  Negative for pain and visual disturbance.   Respiratory:  Negative for cough and shortness of breath.    Cardiovascular:  Negative for chest pain, palpitations and peripheral edema.   Gastrointestinal:  Negative for  "abdominal pain, constipation, diarrhea, heartburn, hematochezia and nausea.   Genitourinary:  Negative for dysuria, frequency, genital sores, hematuria, impotence, penile discharge and urgency.   Musculoskeletal:  Negative for arthralgias, joint swelling and myalgias.   Skin:  Negative for rash.   Neurological:  Negative for dizziness, weakness, headaches and paresthesias.   Psychiatric/Behavioral:  Negative for mood changes. The patient is not nervous/anxious.          OBJECTIVE:   /72 (BP Location: Left arm, Patient Position: Sitting, Cuff Size: Adult Regular)   Pulse 65   Temp 98.4  F (36.9  C) (Oral)   Ht 1.791 m (5' 10.51\")   Wt 74.4 kg (164 lb)   SpO2 98%   BMI 23.19 kg/m   Estimated body mass index is 23.19 kg/m  as calculated from the following:    Height as of this encounter: 1.791 m (5' 10.51\").    Weight as of this encounter: 74.4 kg (164 lb).  Physical Exam  GENERAL: healthy, alert and no distress  EYES: Eyes grossly normal to inspection, PERRL and conjunctivae and sclerae normal  HENT: Grossly normal  NECK: no adenopathy, no asymmetry, masses, or scars and thyroid normal to palpation  RESP: lungs clear to auscultation - no rales, rhonchi or wheezes  CV: regular rate and rhythm, normal S1 S2, no S3 or S4, no murmur, click or rub, no peripheral edema and peripheral pulses intact  ABDOMEN: soft, nontender, no hepatosplenomegaly, no masses   MS: no gross musculoskeletal defects noted, no edema  SKIN: He has a well-healed scar over the right upper forehead where he had the melanoma removal and the right clavicle area where he had a skin graft taken.  He has some residual actinic changes on his face and left forehead.  He has dry thickened skin over his lower back.  He has an erythematous rash in the inguinal crease bilaterally with some satellite lesions and it extends onto the base of the scrotum.  NEURO: Normal strength and tone, mentation intact and speech normal  PSYCH: mentation appears " normal, affect normal/bright    Diagnostic Test Results:  Labs reviewed in [facility]  Lab on 11/29/2023   Component Date Value Ref Range Status     Sodium 11/29/2023 142  135 - 145 mmol/L Final    Reference intervals for this test were updated on 09/26/2023 to more accurately reflect our healthy population. There may be differences in the flagging of prior results with similar values performed with this method. Interpretation of those prior results can be made in the context of the updated reference intervals.      Potassium 11/29/2023 4.2  3.4 - 5.3 mmol/L Final     Carbon Dioxide (CO2) 11/29/2023 24  22 - 29 mmol/L Final     Anion Gap 11/29/2023 11  7 - 15 mmol/L Final     Urea Nitrogen 11/29/2023 17.3  8.0 - 23.0 mg/dL Final     Creatinine 11/29/2023 0.99  0.67 - 1.17 mg/dL Final     GFR Estimate 11/29/2023 81  >60 mL/min/1.73m2 Final     Calcium 11/29/2023 9.1  8.8 - 10.2 mg/dL Final     Chloride 11/29/2023 107  98 - 107 mmol/L Final     Glucose 11/29/2023 90  70 - 99 mg/dL Final     Alkaline Phosphatase 11/29/2023 89  40 - 150 U/L Final    Reference intervals for this test were updated on 11/14/2023 to more accurately reflect our healthy population. There may be differences in the flagging of prior results with similar values performed with this method. Interpretation of those prior results can be made in the context of the updated reference intervals.     AST 11/29/2023 34  0 - 45 U/L Final    Reference intervals for this test were updated on 6/12/2023 to more accurately reflect our healthy population. There may be differences in the flagging of prior results with similar values performed with this method. Interpretation of those prior results can be made in the context of the updated reference intervals.     ALT 11/29/2023 25  0 - 70 U/L Final    Reference intervals for this test were updated on 6/12/2023 to more accurately reflect our healthy population. There may be differences in the flagging of prior results  with similar values performed with this method. Interpretation of those prior results can be made in the context of the updated reference intervals.       Protein Total 11/29/2023 6.9  6.4 - 8.3 g/dL Final     Albumin 11/29/2023 4.1  3.5 - 5.2 g/dL Final     Bilirubin Total 11/29/2023 0.5  <=1.2 mg/dL Final     Hemoglobin A1C 11/29/2023 5.4  0.0 - 5.6 % Final    Normal <5.7%   Prediabetes 5.7-6.4%    Diabetes 6.5% or higher     Note: Adopted from ADA consensus guidelines.     Cholesterol 11/29/2023 140  <200 mg/dL Final     Triglycerides 11/29/2023 57  <150 mg/dL Final     Direct Measure HDL 11/29/2023 45  >=40 mg/dL Final     LDL Cholesterol Calculated 11/29/2023 84  <=100 mg/dL Final     Non HDL Cholesterol 11/29/2023 95  <130 mg/dL Final     WBC Count 11/29/2023 6.6  4.0 - 11.0 10e3/uL Final     RBC Count 11/29/2023 4.96  4.40 - 5.90 10e6/uL Final     Hemoglobin 11/29/2023 15.2  13.3 - 17.7 g/dL Final     Hematocrit 11/29/2023 45.1  40.0 - 53.0 % Final     MCV 11/29/2023 91  78 - 100 fL Final     MCH 11/29/2023 30.6  26.5 - 33.0 pg Final     MCHC 11/29/2023 33.7  31.5 - 36.5 g/dL Final     RDW 11/29/2023 13.0  10.0 - 15.0 % Final     Platelet Count 11/29/2023 192  150 - 450 10e3/uL Final     Prostate Specific Antigen Screen 11/29/2023 2.47  0.00 - 6.50 ng/mL Final         ASSESSMENT / PLAN:       ICD-10-CM    1. Encounter for Medicare annual wellness exam  Z00.00       2. Actinic keratoses  L57.0       3. Spindle cell melanoma (H)  C43.9       4. History of skin cancer  Z85.828       5. Tinea cruris  B35.6       6. Dry skin  L85.3         Blood pressure and other vital signs look excellent  Laboratory tests are all nice and normal  We discussed his ongoing skin challenges and he will continue to meet with dermatology routinely for follow-up on his skin cancers and ongoing treatment of his actinic keratoses  I recommended liberal use of moisturizers for the dry skin on his lower back  I recommended use of  over-the-counter 1% clotrimazole cream for the tinea cruris  He is up-to-date on vaccines and other routine care  Plan a tentative recheck in 1 year, or sooner prn      COUNSELING:  Reviewed preventive health counseling, as reflected in patient instructions       Regular exercise       Healthy diet/nutrition        He reports that he has never smoked. He has never used smokeless tobacco.      Appropriate preventive services were discussed with this patient, including applicable screening as appropriate for fall prevention, nutrition, physical activity, Tobacco-use cessation, weight loss and cognition.  Checklist reviewing preventive services available has been given to the patient.    Reviewed patients plan of care and provided an AVS. The Basic Care Plan (routine screening as documented in Health Maintenance) for [place] meets the Care Plan requirement. This Care Plan has been established and reviewed with the Patient.          Yonis Martin MD  [facility]

## 2023-12-04 NOTE — PROGRESS NOTES
"The patient was counseled and encouraged to consider modifying their diet and eating habits. He was provided with information on recommended healthy diet options.  The patient was provided with written information regarding signs of hearing loss.  Answers submitted by the patient for this visit:  Annual Preventive Visit (Submitted on 11/27/2023)  Chief Complaint: Annual Exam:  In general, how would you rate your overall physical health?: good  Frequency of exercise:: 4-5 days/week  Do you usually eat at least 4 servings of fruit and vegetables a day, include whole grains & fiber, and avoid regularly eating high fat or \"junk\" foods? : No  Taking medications regularly:: Yes  Medication side effects:: None  Activities of Daily Living: no assistance needed  Home safety: no safety concerns identified  Hearing Impairment:: difficulty following a conversation in a noisy restaurant or crowded room  In the past 6 months, have you been bothered by leaking of urine?: No  abdominal pain: No  Blood in stool: No  Blood in urine: No  chest pain: No  chills: No  congestion: No  constipation: No  cough: No  diarrhea: No  dizziness: No  ear pain: No  eye pain: No  nervous/anxious: No  fever: No  frequency: No  genital sores: No  headaches: No  hearing loss: No  heartburn: No  arthralgias: No  joint swelling: No  peripheral edema: No  mood changes: No  myalgias: No  nausea: No  dysuria: No  palpitations: No  Skin sensation changes: No  sore throat: No  urgency: No  rash: No  shortness of breath: No  visual disturbance: No  weakness: No  impotence: No  penile discharge: No  In general, how would you rate your overall mental or emotional health?: excellent  Additional concerns today:: No  Exercise outside of work (Submitted on 11/27/2023)  Chief Complaint: Annual Exam:  Duration of exercise:: 30-45 minutes    "

## 2024-05-14 ENCOUNTER — TELEPHONE (OUTPATIENT)
Dept: FAMILY MEDICINE | Facility: CLINIC | Age: 74
End: 2024-05-14
Payer: COMMERCIAL

## 2024-05-14 DIAGNOSIS — U07.1 INFECTION DUE TO 2019 NOVEL CORONAVIRUS: Primary | ICD-10-CM

## 2024-05-14 RX ORDER — PEMBROLIZUMAB 25 MG/ML
INJECTION, SOLUTION INTRAVENOUS
COMMUNITY
Start: 2024-05-14

## 2024-05-14 NOTE — TELEPHONE ENCOUNTER
Patient calling, tested positive for covid yesterday, symptoms started Sunday.  Having runny nose, stuffy nose, tired.   No fever, has a minor headache.    Is improving a bit and plans to mow the lawn.    Is immunosuppressed,  on keytruda infusion for melanoma treatment.  Keytruda does not flag as an interaction.   His symptoms are fairly mild, I advised I can send the Rx for paxlovid and he should call his oncologist to see if they see any problems with doing it or not.    Patient verbalized understanding of and agreement with plan.        RN COVID TREATMENT VISIT  05/14/24      The patient has been triaged and does not require a higher level of care.    Trent Estrada  73 year old  Current weight? 162 lbs    Has the patient been seen by a primary care provider at an [facility] or [facility] within the past two years? Yes.   Have you been in close proximity to/do you have a known exposure to a person with a confirmed case of influenza? No.     General treatment eligibility:  Date of positive COVID test (PCR or at home)?  5/13/24    Are you or have you been hospitalized for this COVID-19 infection? No.   Have you received monoclonal antibodies or antiviral treatment for COVID-19 since this positive test? No.   Do you have any of the following conditions that place you at risk of being very sick from COVID-19?   - Age 50 years or older  - Cancer/active malignancy including patients with cancer who are currently receiving chemotherapy or radiation, metastatic cancer, advance cancer and are receiving palliative care  Yes, patient has at least one high risk condition as noted above.     Current COVID symptoms:   - cough  - fatigue  - headache  - congestion or runny nose  Yes. Patient has at least one symptom as selected.     How many days since symptoms started? 5 days or less. Established patient, 12 years or older weighing at least 88.2 lbs, who has symptoms that started in the past 5  days, has not been hospitalized nor received treatment already, and is at risk for being very sick from COVID-19.     Treatment eligibility by RN:  Are you currently pregnant or nursing? No  Do you have a clinically significant hypersensitivity to nirmatrelvir or ritonavir, or toxic epidermal necrolysis (TEN) or Vasquez-Rod Syndrome? No  Do you have a history of hepatitis, any hepatic impairment on the Problem List (such as Child-Gabriel Class C, cirrhosis, fatty liver disease, alcoholic liver disease), or was the last liver lab (hepatic panel, ALT, AST, ALK Phos, bilirubin) elevated in the past 6 months? No  Do you have any history of severe renal impairment (eGFR < 30mL/min)? No    Is patient eligible to continue? Yes, patient meets all eligibility requirements for the RN COVID treatment (as denoted by all no responses above).     Current Outpatient Medications   Medication Sig Dispense Refill    pembrolizumab (KEYTRUDA) 25 MG/ML       NO ACTIVE MEDICATIONS          Medications from List 1 of the standing order (on medications that exclude the use of Paxlovid) that patient is taking: NONE. Is patient taking Doe's Wort? No  Is patient taking Jeffers's Wort or any meds from List 1? No.   Medications from List 2 of the standing order (on meds that provider needs to adjust) that patient is taking: NONE. Is patient on any of the meds from List 2? No.   Medications from List 3 of standing order (on meds that a RN needs to adjust) that patient is taking: NONE. Is patient on any meds from List 3? No.     Paxlovid has an approximate 90% reduction in hospitalization. Paxlovid can possibly cause altered sense of taste, diarrhea (loose, watery stools), high blood pressure, muscle aches.     Would patient like a Paxlovid prescription?   Yes.   Lab Results   Component Value Date    GFRESTIMATED 81 11/29/2023       Was last eGFR reduced? No, eGFR 60 or greater/ No Result on record. Patient can receive the normal renal  function dose. Paxlovid Rx sent to [facility]   [facility] [phone]  [address], [address]  [place] [address]    Hours:  Mon-Fri: 7:00a - 7:00p    Drive-thru services available.    Temporary change to home medications: None    All medication adjustments (holds, etc) were discussed with the patient and patient was asked to repeat back (teachback) their med adjustment.  Did patient understand med adjustment? No medication adjustments needed.         Reviewed the following instructions with the patient:    Paxlovid (nimatrelvir and ritonavir)    How it works  Two medicines (nirmatrelvir and ritonavir) are taken together. They stop the virus from growing. Less amount of virus is easier for your body to fight.    How to take  Medicine comes in a daily container with both medicine tablets. Take by mouth twice daily (once in the morning, once at night) for 5 days.  The number of tablets to take varies by patient.  Don't chew or break capsules. Swallow whole.    When to take  Take as soon as possible after positive COVID-19 test result, and within 5 days of your first symptoms.    Possible side effects  Can cause altered sense of taste, diarrhea (loose, watery stools), high blood pressure, muscle aches.    Katie Nunez RN

## 2024-07-19 ENCOUNTER — TRANSFERRED RECORDS (OUTPATIENT)
Dept: HEALTH INFORMATION MANAGEMENT | Facility: CLINIC | Age: 74
End: 2024-07-19
Payer: COMMERCIAL

## 2024-12-05 SDOH — HEALTH STABILITY: PHYSICAL HEALTH: ON AVERAGE, HOW MANY DAYS PER WEEK DO YOU ENGAGE IN MODERATE TO STRENUOUS EXERCISE (LIKE A BRISK WALK)?: 5 DAYS

## 2024-12-05 SDOH — HEALTH STABILITY: PHYSICAL HEALTH: ON AVERAGE, HOW MANY MINUTES DO YOU ENGAGE IN EXERCISE AT THIS LEVEL?: 60 MIN

## 2024-12-05 ASSESSMENT — SOCIAL DETERMINANTS OF HEALTH (SDOH): HOW OFTEN DO YOU GET TOGETHER WITH FRIENDS OR RELATIVES?: ONCE A WEEK

## 2024-12-10 ENCOUNTER — OFFICE VISIT (OUTPATIENT)
Dept: FAMILY MEDICINE | Facility: CLINIC | Age: 74
End: 2024-12-10
Attending: FAMILY MEDICINE
Payer: COMMERCIAL

## 2024-12-10 VITALS
BODY MASS INDEX: 23.55 KG/M2 | HEIGHT: 71 IN | WEIGHT: 168.2 LBS | DIASTOLIC BLOOD PRESSURE: 73 MMHG | OXYGEN SATURATION: 99 % | SYSTOLIC BLOOD PRESSURE: 115 MMHG | RESPIRATION RATE: 16 BRPM | HEART RATE: 59 BPM | TEMPERATURE: 97.4 F

## 2024-12-10 DIAGNOSIS — C49.0 MALIGNANT NEOPLASM OF CONNECTIVE AND SOFT TISSUE OF HEAD, FACE AND NECK (H): ICD-10-CM

## 2024-12-10 DIAGNOSIS — L57.0 ACTINIC KERATOSES: ICD-10-CM

## 2024-12-10 DIAGNOSIS — Z85.828 HISTORY OF SKIN CANCER: ICD-10-CM

## 2024-12-10 DIAGNOSIS — Z00.00 ENCOUNTER FOR MEDICARE ANNUAL WELLNESS EXAM: Primary | ICD-10-CM

## 2024-12-10 DIAGNOSIS — C43.9: ICD-10-CM

## 2024-12-10 PROCEDURE — G0439 PPPS, SUBSEQ VISIT: HCPCS | Performed by: FAMILY MEDICINE

## 2024-12-10 NOTE — PROGRESS NOTES
Preventive Care Visit  [facility]  Yonis Martin MD, Family Medicine  Dec 10, 2024      Assessment & Plan       ICD-10-CM    1. Encounter for Medicare annual wellness exam  Z00.00       2. 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 Actinic keratoses  L57.0         Blood pressure and other vital signs look great  He has been having routine lab tests and they have been good and his lab testing in our clinic a year ago was good, so we elected to pass on lab tests this year with us  He will continue ongoing dermatology and oncology follow-up as per their recommendations  Plan a recheck in 1 year, or sooner prn      Counseling  Appropriate preventive services were addressed with this patient via screening, questionnaire, or discussion as appropriate for fall prevention, nutrition, physical activity, Tobacco-use cessation, social engagement, weight loss and cognition.  Checklist reviewing preventive services available has been given to the patient.  Reviewed patient's diet, addressing concerns and/or questions.   The patient was provided with written information regarding signs of hearing loss.         Lillie Velez is a 73 year old, presenting for the following:  Physical (ANNUAL )        12/10/2024     8:14 AM   Additional Questions   Roomed by LAMAR TERESA    He has a history of skin cancers, including melanoma of the upper forehead/scalp region.  He sees dermatology every 3 to 4 months for skin checks.  He sees oncology every 6 months.  He has completed immunotherapy for his melanoma.  He was getting lab testing done every 6 weeks or so through them.  He is feeling well currently.  He exercises on a regular basis.  He is up-to-date on routine vaccines and cancer screening tests.      Health Care Directive  Patient does not have a Health Care  Directive      12/5/2024   General Health   How would you rate your overall physical health? Excellent   Feel stress (tense, anxious, or unable to sleep) Not at all            12/5/2024   Nutrition   Diet: Regular (no restrictions)            12/5/2024   Exercise   Days per week of moderate/strenous exercise 5 days   Average minutes spent exercising at this level 60 min            12/5/2024   Social Factors   Frequency of gathering with friends or relatives Once a week   Worry food won't last until get money to buy more No   Food not last or not have enough money for food? No   Do you have housing? (Housing is defined as stable permanent housing and does not include staying ouside in a car, in a tent, in an abandoned building, in an overnight shelter, or couch-surfing.) Yes   Are you worried about losing your housing? No   Lack of transportation? No   Unable to get utilities (heat,electricity)? No            12/5/2024   Fall Risk   Fallen 2 or more times in the past year? No     No    Trouble with walking or balance? No     No        Patient-reported    Multiple values from one day are sorted in reverse-chronological order          12/5/2024   Activities of Daily Living- Home Safety   Needs help with the following daily activites None of the above   Safety concerns in the home None of the above            12/5/2024   Dental   Dentist two times every year? Yes            12/5/2024   Hearing Screening   Hearing concerns? (!) IT'S HARD TO FOLLOW A CONVERSATION IN A NOISY RESTAURANT OR CROWDED ROOM.            12/5/2024   Driving Risk Screening   Patient/family members have concerns about driving No            12/5/2024   General Alertness/Fatigue Screening   Have you been more tired than usual lately? No            12/5/2024   Urinary Incontinence Screening   Bothered by leaking urine in past 6 months No            12/5/2024   TB Screening   Were you born outside of the [place]? No            Today's PHQ-2 Score:        12/10/2024     8:07 AM   PHQ-2 ( 1999 Pf[employer])   Q1: Little interest or pleasure in doing things 0    Q2: Feeling down, depressed or hopeless 0    PHQ-2 Score 0    Q1: Little interest or pleasure in doing things Not at all   Q2: Feeling down, depressed or hopeless Not at all   PHQ-2 Score 0       Patient-reported           12/5/2024   Substance Use   Alcohol more than 3/day or more than 7/wk No   Do you have a current opioid prescription? No   How severe/bad is pain from 1 to 10? 0/10 (No Pain)   Do you use any other substances recreationally? No        Social History     Tobacco Use    Smoking status: Never    Smokeless tobacco: Never   Vaping Use    Vaping status: Never Used   Substance Use Topics    Alcohol use: No    Drug use: No           12/5/2024   AAA Screening   Family history of Abdominal Aortic Aneurysm (AAA)? No      ASCVD Risk   The 10-year ASCVD risk score (Cassandra PORTILLO, et al., 2019) is: 16.8%    Values used to calculate the score:      Age: 73 years      Sex: Male      Is Non- : No      Diabetic: No      Tobacco smoker: No      Systolic Blood Pressure: 115 mmHg      Is BP treated: No      HDL Cholesterol: 45 mg/dL      Total Cholesterol: 140 mg/dL          Reviewed and updated as needed this visit by Provider                    Patient Active Problem List   Diagnosis    Actinic keratoses    History of skin cancer    Status post total replacement of left hip    Impaired fasting glucose    Spindle cell melanoma (H)    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 Rankin [facility]    VASECTOMY      VAS,REVERSAL,VAS -- in the        Social History     Tobacco Use    Smoking status: Never    Smokeless tobacco: Never   Substance Use Topics    Alcohol use: No     Family History   Problem Relation Age of Onset    Hypertension Mother          age 94 of CVA    Hypertension Father     Cancer Father     Prostate Cancer Father         in mid 70's,  age 84 of pneumonia    Alzheimer Disease Father     Cancer Son     Depression Sister          Current Outpatient Medications   Medication Sig Dispense Refill    NO ACTIVE MEDICATIONS        No Known Allergies  Current providers sharing in care for this patient include:  Patient Care Team:  Yonis Martin MD as PCP - General  Yonis Martin MD as Assigned PCP    The following health maintenance items are reviewed in Epic and correct as of today:  Health Maintenance   Topic Date Due    ANNUAL REVIEW OF HM ORDERS  2023    MEDICARE ANNUAL WELLNESS VISIT  2024    COVID-19 Vaccine ( season) 2025    FALL RISK ASSESSMENT  12/10/2025    GLUCOSE  2026    LIPID  2028    ADVANCE CARE PLANNING  2028    COLORECTAL CANCER SCREENING  2028    DTAP/TDAP/TD IMMUNIZATION (3 - Td or Tdap) 2030    HEPATITIS C SCREENING  Completed    PHQ-2 (once per calendar year)  Completed    INFLUENZA VACCINE  Completed    Pneumococcal Vaccine: 65+ Years  Completed    ZOSTER IMMUNIZATION  Completed    RSV VACCINE  Completed    HPV IMMUNIZATION  Aged Out    MENINGITIS IMMUNIZATION  Aged Out    RSV MONOCLONAL ANTIBODY  Aged Out         Review of Systems  Some decreased hearing, but it is manageable.  Constitutional, neuro, ENT, endocrine, pulmonary, cardiac, gastrointestinal, genitourinary, musculoskeletal, integument and psychiatric systems are negative, except as otherwise noted.     Objective    Exam  /73   Pulse 59   Temp 97.4  F (36.3  C)  "(Temporal)   Resp 16   Ht 1.81 m (5' 11.26\")   Wt 76.3 kg (168 lb 3.2 oz)   SpO2 99%   BMI 23.29 kg/m     Estimated body mass index is 23.29 kg/m  as calculated from the following:    Height as of this encounter: 1.81 m (5' 11.26\").    Weight as of this encounter: 76.3 kg (168 lb 3.2 oz).    Physical Exam  GENERAL: alert and no distress  EYES: Eyes grossly normal to inspection, PERRL and conjunctivae and sclerae normal  HENT: Some earwax present in both ear canals, but it is not blocking  NECK: no adenopathy, no asymmetry, masses, or scars  RESP: lungs clear to auscultation - no rales, rhonchi or wheezes  CV: regular rate and rhythm, normal S1 S2, no S3 or S4, no murmur, click or rub, no peripheral edema  ABDOMEN: soft, nontender, no hepatosplenomegaly, no masses   MS: no gross musculoskeletal defects noted, no edema  SKIN: no suspicious lesions or rashes  NEURO: Normal strength and tone, mentation intact and speech normal  PSYCH: mentation appears normal, affect normal/bright        12/10/2024   Mini Cog   Clock Draw Score 2 Normal   3 Item Recall 3 objects recalled   Mini Cog Total Score 5                 Signed Electronically by: Yonis Martin MD    "

## 2025-01-06 ENCOUNTER — E-VISIT (OUTPATIENT)
Dept: FAMILY MEDICINE | Facility: CLINIC | Age: 75
End: 2025-01-06
Payer: COMMERCIAL

## 2025-01-06 DIAGNOSIS — J20.9 ACUTE BRONCHITIS WITH SYMPTOMS > 10 DAYS: Primary | ICD-10-CM

## 2025-01-06 RX ORDER — AZITHROMYCIN 250 MG/1
TABLET, FILM COATED ORAL
Qty: 6 TABLET | Refills: 0 | Status: SHIPPED | OUTPATIENT
Start: 2025-01-06 | End: 2025-01-11

## 2025-01-16 ENCOUNTER — TRANSFERRED RECORDS (OUTPATIENT)
Dept: HEALTH INFORMATION MANAGEMENT | Facility: CLINIC | Age: 75
End: 2025-01-16
Payer: COMMERCIAL

## 2025-01-25 ENCOUNTER — HEALTH MAINTENANCE LETTER (OUTPATIENT)
Age: 75
End: 2025-01-25

## 2025-02-03 ENCOUNTER — NURSE TRIAGE (OUTPATIENT)
Dept: FAMILY MEDICINE | Facility: CLINIC | Age: 75
End: 2025-02-03
Payer: COMMERCIAL

## 2025-02-03 NOTE — TELEPHONE ENCOUNTER
Called patient. Left voice message to return call at [phone].    When patient returns call, please further triage symptoms.    CAROLINA HairstonN RN  [facility]

## 2025-02-05 ENCOUNTER — OFFICE VISIT (OUTPATIENT)
Dept: FAMILY MEDICINE | Facility: CLINIC | Age: 75
End: 2025-02-05
Payer: COMMERCIAL

## 2025-02-05 VITALS
BODY MASS INDEX: 23.96 KG/M2 | TEMPERATURE: 97 F | OXYGEN SATURATION: 99 % | WEIGHT: 171.13 LBS | SYSTOLIC BLOOD PRESSURE: 121 MMHG | HEART RATE: 58 BPM | HEIGHT: 71 IN | DIASTOLIC BLOOD PRESSURE: 80 MMHG | RESPIRATION RATE: 14 BRPM

## 2025-02-05 DIAGNOSIS — C49.0 MALIGNANT NEOPLASM OF CONNECTIVE AND SOFT TISSUE OF HEAD, FACE AND NECK (H): ICD-10-CM

## 2025-02-05 DIAGNOSIS — M79.645 PAIN OF LEFT THUMB: Primary | ICD-10-CM

## 2025-02-05 DIAGNOSIS — N52.9 ERECTILE DYSFUNCTION, UNSPECIFIED ERECTILE DYSFUNCTION TYPE: ICD-10-CM

## 2025-02-05 PROCEDURE — 99214 OFFICE O/P EST MOD 30 MIN: CPT | Performed by: FAMILY MEDICINE

## 2025-02-05 PROCEDURE — G2211 COMPLEX E/M VISIT ADD ON: HCPCS | Performed by: FAMILY MEDICINE

## 2025-02-05 ASSESSMENT — PAIN SCALES - GENERAL: PAINLEVEL_OUTOF10: NO PAIN (0)

## 2025-02-05 NOTE — PROGRESS NOTES
Assessment & Plan       ICD-10-CM    1. Pain of left thumb  M79.645       2. Erectile dysfunction, unspecified erectile dysfunction type  N52.9       3. 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 if symptoms persist  We discussed his erectile dysfunction and he will take that up with oncology, but I did review some standard treatment options for that and could pursue that with him further in the future if desired    If new, worsening or persistent symptoms, the patient is to call or return for a recheck.    The longitudinal plan of care for the diagnosis(es)/condition(s) as documented were addressed during this visit. Due to the added complexity in care, I will continue to support Agustin in the subsequent management and with ongoing continuity of care.        Subjective   Agustin is a 74 year old, presenting for the following health issues:  Patient Request (Left palm to thumb area pain for the past 4-6 weeks)        2/5/2025    10:15 AM   Additional Questions   Roomed by Jocelyn Fitzgerald     History of Present Illness       Reason for visit:  Pain in left palm  Symptom onset:  More than a month  Symptoms include:  Pain in left palm  Symptom intensity:  Moderate  Symptom progression:  Staying the same  Had these symptoms before:  No   He is taking medications regularly.     He started getting some pain at the base of his left thumb on the palmar aspect about a month or so ago.  No specific injury or trauma.  He does note that his 3-year-old grandson had a leg injury and had  "to be carried around for a while.  Agustin notes that getting his grandson in and out of the car seat seems to aggravate his left thumb pain.  He has also been doing a lot of weight lifting and other activities in the gym and some of those activities seem to aggravate it.    He also notes that he is having a little bit of erectile dysfunction.  He had some chemotherapy for his spindle cell melanoma last year which ended a few months ago and he was hoping his ED might come around after that, but it has not.  He plans to talk to his oncologist about that in the upcoming weeks.    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  See outside note in chart regarding that.      Objective    /80 (BP Location: Right arm, Patient Position: Chair, Cuff Size: Adult Regular)   Pulse 58   Temp 97  F (36.1  C) (Temporal)   Resp 14   Ht 1.81 m (5' 11.26\")   Wt 77.6 kg (171 lb 2 oz)   SpO2 99%   BMI 23.69 kg/m    Body mass index is 23.69 kg/m .  Physical Exam   GENERAL: alert and no distress  MS: He has some discomfort at the base of his left thumb on the palmar aspect in the region of the thenar eminence.  Some slight swelling there.  Some mild discomfort with range of motion of the thumb.  He does not have pain extending up into the extensor tendons of the left thumb into the forearm.  He does have a small mucinous cyst on the extensor side of the distal left fourth finger.          Signed Electronically by: Yonis PERALES" MD Veronica